# Patient Record
Sex: MALE | Race: WHITE | Employment: OTHER | URBAN - METROPOLITAN AREA
[De-identification: names, ages, dates, MRNs, and addresses within clinical notes are randomized per-mention and may not be internally consistent; named-entity substitution may affect disease eponyms.]

---

## 2019-03-18 ENCOUNTER — HOSPITAL ENCOUNTER (INPATIENT)
Age: 74
LOS: 3 days | Discharge: HOME | DRG: 556 | End: 2019-03-22
Attending: EMERGENCY MEDICINE | Admitting: INTERNAL MEDICINE
Payer: MEDICARE

## 2019-03-18 ENCOUNTER — APPOINTMENT (OUTPATIENT)
Dept: GENERAL RADIOLOGY | Age: 74
DRG: 556 | End: 2019-03-18
Payer: MEDICARE

## 2019-03-18 DIAGNOSIS — R77.8 ELEVATED TROPONIN: ICD-10-CM

## 2019-03-18 DIAGNOSIS — R41.0 CONFUSION: Primary | ICD-10-CM

## 2019-03-18 PROCEDURE — 93005 ELECTROCARDIOGRAM TRACING: CPT | Performed by: EMERGENCY MEDICINE

## 2019-03-18 PROCEDURE — 99285 EMERGENCY DEPT VISIT HI MDM: CPT

## 2019-03-18 RX ORDER — PREGABALIN 100 MG/1
100 CAPSULE ORAL 2 TIMES DAILY
COMMUNITY

## 2019-03-18 RX ORDER — METOPROLOL TARTRATE 50 MG/1
25 TABLET, FILM COATED ORAL 2 TIMES DAILY
COMMUNITY

## 2019-03-18 RX ORDER — ALBUTEROL SULFATE 90 UG/1
2 AEROSOL, METERED RESPIRATORY (INHALATION) EVERY 6 HOURS PRN
COMMUNITY

## 2019-03-18 RX ORDER — KETOROLAC TROMETHAMINE 30 MG/ML
15 INJECTION, SOLUTION INTRAMUSCULAR; INTRAVENOUS ONCE
Status: COMPLETED | OUTPATIENT
Start: 2019-03-19 | End: 2019-03-19

## 2019-03-18 RX ORDER — BUPROPION HYDROCHLORIDE 100 MG/1
100 TABLET ORAL 2 TIMES DAILY
Status: ON HOLD | COMMUNITY
End: 2019-03-19

## 2019-03-18 RX ORDER — OXYCODONE HCL 20 MG/1
20 TABLET, FILM COATED, EXTENDED RELEASE ORAL EVERY 12 HOURS
COMMUNITY

## 2019-03-18 RX ORDER — TRAZODONE HYDROCHLORIDE 100 MG/1
100 TABLET ORAL NIGHTLY
COMMUNITY

## 2019-03-18 ASSESSMENT — PAIN SCALES - GENERAL: PAINLEVEL_OUTOF10: 10

## 2019-03-18 ASSESSMENT — PAIN DESCRIPTION - FREQUENCY: FREQUENCY: CONTINUOUS

## 2019-03-18 ASSESSMENT — PAIN DESCRIPTION - LOCATION: LOCATION: GENERALIZED

## 2019-03-18 ASSESSMENT — PAIN DESCRIPTION - PAIN TYPE: TYPE: CHRONIC PAIN

## 2019-03-18 ASSESSMENT — PAIN DESCRIPTION - ONSET: ONSET: PROGRESSIVE

## 2019-03-18 ASSESSMENT — PAIN DESCRIPTION - PROGRESSION: CLINICAL_PROGRESSION: NOT CHANGED

## 2019-03-18 ASSESSMENT — PAIN DESCRIPTION - DESCRIPTORS: DESCRIPTORS: ACHING

## 2019-03-19 ENCOUNTER — APPOINTMENT (OUTPATIENT)
Dept: GENERAL RADIOLOGY | Age: 74
DRG: 556 | End: 2019-03-19
Payer: MEDICARE

## 2019-03-19 ENCOUNTER — APPOINTMENT (OUTPATIENT)
Dept: CT IMAGING | Age: 74
DRG: 556 | End: 2019-03-19
Payer: MEDICARE

## 2019-03-19 PROBLEM — I21.4 NSTEMI (NON-ST ELEVATED MYOCARDIAL INFARCTION) (HCC): Status: ACTIVE | Noted: 2019-03-19

## 2019-03-19 LAB
A/G RATIO: 0.9 (ref 1.1–2.2)
ALBUMIN SERPL-MCNC: 3.3 G/DL (ref 3.4–5)
ALP BLD-CCNC: 124 U/L (ref 40–129)
ALT SERPL-CCNC: 12 U/L (ref 10–40)
AMORPHOUS: ABNORMAL /HPF
ANION GAP SERPL CALCULATED.3IONS-SCNC: 10 MMOL/L (ref 3–16)
AST SERPL-CCNC: 17 U/L (ref 15–37)
BACTERIA: ABNORMAL /HPF
BASOPHILS ABSOLUTE: 0.1 K/UL (ref 0–0.2)
BASOPHILS RELATIVE PERCENT: 0.4 %
BILIRUB SERPL-MCNC: 1.1 MG/DL (ref 0–1)
BILIRUBIN URINE: NEGATIVE
BLOOD, URINE: ABNORMAL
BUN BLDV-MCNC: 17 MG/DL (ref 7–20)
CALCIUM SERPL-MCNC: 8.7 MG/DL (ref 8.3–10.6)
CHLORIDE BLD-SCNC: 98 MMOL/L (ref 99–110)
CHP ED QC CHECK: NORMAL
CLARITY: CLEAR
CO2: 28 MMOL/L (ref 21–32)
COLOR: YELLOW
CREAT SERPL-MCNC: 1.1 MG/DL (ref 0.8–1.3)
EKG ATRIAL RATE: 86 BPM
EKG DIAGNOSIS: NORMAL
EKG P AXIS: 60 DEGREES
EKG P-R INTERVAL: 180 MS
EKG Q-T INTERVAL: 394 MS
EKG QRS DURATION: 106 MS
EKG QTC CALCULATION (BAZETT): 471 MS
EKG R AXIS: 23 DEGREES
EKG T AXIS: 30 DEGREES
EKG VENTRICULAR RATE: 86 BPM
EOSINOPHILS ABSOLUTE: 0 K/UL (ref 0–0.6)
EOSINOPHILS RELATIVE PERCENT: 0 %
GFR AFRICAN AMERICAN: >60
GFR NON-AFRICAN AMERICAN: >60
GLOBULIN: 3.6 G/DL
GLUCOSE BLD-MCNC: 121 MG/DL
GLUCOSE BLD-MCNC: 121 MG/DL (ref 70–99)
GLUCOSE BLD-MCNC: 162 MG/DL (ref 70–99)
GLUCOSE URINE: NEGATIVE MG/DL
HCT VFR BLD CALC: 40.3 % (ref 40.5–52.5)
HEMOGLOBIN: 13.9 G/DL (ref 13.5–17.5)
KETONES, URINE: NEGATIVE MG/DL
LACTIC ACID: 0.8 MMOL/L (ref 0.4–2)
LEUKOCYTE ESTERASE, URINE: NEGATIVE
LYMPHOCYTES ABSOLUTE: 0.7 K/UL (ref 1–5.1)
LYMPHOCYTES RELATIVE PERCENT: 4.1 %
MAGNESIUM: 1.5 MG/DL (ref 1.8–2.4)
MCH RBC QN AUTO: 32.6 PG (ref 26–34)
MCHC RBC AUTO-ENTMCNC: 34.4 G/DL (ref 31–36)
MCV RBC AUTO: 94.6 FL (ref 80–100)
MICROSCOPIC EXAMINATION: YES
MONOCYTES ABSOLUTE: 1.3 K/UL (ref 0–1.3)
MONOCYTES RELATIVE PERCENT: 7.4 %
NEUTROPHILS ABSOLUTE: 15.3 K/UL (ref 1.7–7.7)
NEUTROPHILS RELATIVE PERCENT: 88.1 %
NITRITE, URINE: NEGATIVE
PDW BLD-RTO: 14.1 % (ref 12.4–15.4)
PERFORMED ON: ABNORMAL
PH UA: 6 (ref 5–8)
PLATELET # BLD: 144 K/UL (ref 135–450)
PMV BLD AUTO: 10.8 FL (ref 5–10.5)
POTASSIUM REFLEX MAGNESIUM: 3.3 MMOL/L (ref 3.5–5.1)
PROTEIN UA: 100 MG/DL
RAPID INFLUENZA  B AGN: NEGATIVE
RAPID INFLUENZA A AGN: NEGATIVE
RBC # BLD: 4.26 M/UL (ref 4.2–5.9)
RBC UA: ABNORMAL /HPF (ref 0–2)
SODIUM BLD-SCNC: 136 MMOL/L (ref 136–145)
SPECIFIC GRAVITY UA: >=1.03 (ref 1–1.03)
TOTAL PROTEIN: 6.9 G/DL (ref 6.4–8.2)
TROPONIN: 0.05 NG/ML
TROPONIN: 0.06 NG/ML
TROPONIN: 0.08 NG/ML
URINE REFLEX TO CULTURE: ABNORMAL
URINE TYPE: ABNORMAL
UROBILINOGEN, URINE: 0.2 E.U./DL
WBC # BLD: 17.4 K/UL (ref 4–11)
WBC UA: ABNORMAL /HPF (ref 0–5)

## 2019-03-19 PROCEDURE — 96372 THER/PROPH/DIAG INJ SC/IM: CPT

## 2019-03-19 PROCEDURE — 87804 INFLUENZA ASSAY W/OPTIC: CPT

## 2019-03-19 PROCEDURE — 96361 HYDRATE IV INFUSION ADD-ON: CPT

## 2019-03-19 PROCEDURE — 81001 URINALYSIS AUTO W/SCOPE: CPT

## 2019-03-19 PROCEDURE — 80053 COMPREHEN METABOLIC PANEL: CPT

## 2019-03-19 PROCEDURE — 6370000000 HC RX 637 (ALT 250 FOR IP): Performed by: NURSE PRACTITIONER

## 2019-03-19 PROCEDURE — 6360000002 HC RX W HCPCS: Performed by: EMERGENCY MEDICINE

## 2019-03-19 PROCEDURE — 96375 TX/PRO/DX INJ NEW DRUG ADDON: CPT

## 2019-03-19 PROCEDURE — 93010 ELECTROCARDIOGRAM REPORT: CPT | Performed by: INTERNAL MEDICINE

## 2019-03-19 PROCEDURE — 1200000000 HC SEMI PRIVATE

## 2019-03-19 PROCEDURE — 70450 CT HEAD/BRAIN W/O DYE: CPT

## 2019-03-19 PROCEDURE — 99222 1ST HOSP IP/OBS MODERATE 55: CPT | Performed by: INTERNAL MEDICINE

## 2019-03-19 PROCEDURE — 87040 BLOOD CULTURE FOR BACTERIA: CPT

## 2019-03-19 PROCEDURE — 6370000000 HC RX 637 (ALT 250 FOR IP): Performed by: EMERGENCY MEDICINE

## 2019-03-19 PROCEDURE — 36415 COLL VENOUS BLD VENIPUNCTURE: CPT

## 2019-03-19 PROCEDURE — 96365 THER/PROPH/DIAG IV INF INIT: CPT

## 2019-03-19 PROCEDURE — 2580000003 HC RX 258: Performed by: EMERGENCY MEDICINE

## 2019-03-19 PROCEDURE — 6360000002 HC RX W HCPCS: Performed by: NURSE PRACTITIONER

## 2019-03-19 PROCEDURE — 96366 THER/PROPH/DIAG IV INF ADDON: CPT

## 2019-03-19 PROCEDURE — 84484 ASSAY OF TROPONIN QUANT: CPT

## 2019-03-19 PROCEDURE — 71045 X-RAY EXAM CHEST 1 VIEW: CPT

## 2019-03-19 PROCEDURE — 83605 ASSAY OF LACTIC ACID: CPT

## 2019-03-19 PROCEDURE — 2580000003 HC RX 258: Performed by: NURSE PRACTITIONER

## 2019-03-19 PROCEDURE — 83735 ASSAY OF MAGNESIUM: CPT

## 2019-03-19 PROCEDURE — 85025 COMPLETE CBC W/AUTO DIFF WBC: CPT

## 2019-03-19 PROCEDURE — 96376 TX/PRO/DX INJ SAME DRUG ADON: CPT

## 2019-03-19 RX ORDER — ACETAMINOPHEN 325 MG/1
650 TABLET ORAL ONCE
Status: COMPLETED | OUTPATIENT
Start: 2019-03-19 | End: 2019-03-19

## 2019-03-19 RX ORDER — ALBUTEROL SULFATE 90 UG/1
2 AEROSOL, METERED RESPIRATORY (INHALATION) EVERY 6 HOURS PRN
Status: DISCONTINUED | OUTPATIENT
Start: 2019-03-19 | End: 2019-03-22 | Stop reason: HOSPADM

## 2019-03-19 RX ORDER — FENTANYL CITRATE 50 UG/ML
50 INJECTION, SOLUTION INTRAMUSCULAR; INTRAVENOUS ONCE
Status: COMPLETED | OUTPATIENT
Start: 2019-03-19 | End: 2019-03-19

## 2019-03-19 RX ORDER — HYDROXYZINE PAMOATE 25 MG/1
25 CAPSULE ORAL 3 TIMES DAILY PRN
COMMUNITY

## 2019-03-19 RX ORDER — OMEPRAZOLE 20 MG/1
20 CAPSULE, DELAYED RELEASE ORAL DAILY
COMMUNITY

## 2019-03-19 RX ORDER — ATORVASTATIN CALCIUM 10 MG/1
40 TABLET, FILM COATED ORAL NIGHTLY
Status: DISCONTINUED | OUTPATIENT
Start: 2019-03-19 | End: 2019-03-22 | Stop reason: HOSPADM

## 2019-03-19 RX ORDER — SENNA PLUS 8.6 MG/1
1 TABLET ORAL 2 TIMES DAILY
COMMUNITY

## 2019-03-19 RX ORDER — BUPROPION HYDROCHLORIDE 100 MG/1
100 TABLET ORAL 2 TIMES DAILY
Status: DISCONTINUED | OUTPATIENT
Start: 2019-03-19 | End: 2019-03-22 | Stop reason: HOSPADM

## 2019-03-19 RX ORDER — OXYCODONE HCL 20 MG/1
20 TABLET, FILM COATED, EXTENDED RELEASE ORAL EVERY 12 HOURS
Status: DISCONTINUED | OUTPATIENT
Start: 2019-03-19 | End: 2019-03-22 | Stop reason: HOSPADM

## 2019-03-19 RX ORDER — BUPROPION HYDROCHLORIDE 150 MG/1
300 TABLET ORAL EVERY MORNING
COMMUNITY

## 2019-03-19 RX ORDER — ASPIRIN 81 MG/1
81 TABLET, CHEWABLE ORAL DAILY
Status: DISCONTINUED | OUTPATIENT
Start: 2019-03-20 | End: 2019-03-22 | Stop reason: HOSPADM

## 2019-03-19 RX ORDER — SODIUM CHLORIDE 9 MG/ML
INJECTION, SOLUTION INTRAVENOUS CONTINUOUS
Status: DISCONTINUED | OUTPATIENT
Start: 2019-03-19 | End: 2019-03-21

## 2019-03-19 RX ORDER — MORPHINE SULFATE 2 MG/ML
2 INJECTION, SOLUTION INTRAMUSCULAR; INTRAVENOUS EVERY 4 HOURS PRN
Status: DISCONTINUED | OUTPATIENT
Start: 2019-03-19 | End: 2019-03-22 | Stop reason: HOSPADM

## 2019-03-19 RX ORDER — SODIUM CHLORIDE 0.9 % (FLUSH) 0.9 %
10 SYRINGE (ML) INJECTION EVERY 12 HOURS SCHEDULED
Status: DISCONTINUED | OUTPATIENT
Start: 2019-03-19 | End: 2019-03-22 | Stop reason: HOSPADM

## 2019-03-19 RX ORDER — ONDANSETRON 2 MG/ML
4 INJECTION INTRAMUSCULAR; INTRAVENOUS EVERY 6 HOURS PRN
Status: DISCONTINUED | OUTPATIENT
Start: 2019-03-19 | End: 2019-03-22 | Stop reason: HOSPADM

## 2019-03-19 RX ORDER — ATORVASTATIN CALCIUM 40 MG/1
40 TABLET, FILM COATED ORAL NIGHTLY
COMMUNITY

## 2019-03-19 RX ORDER — ALLOPURINOL 100 MG/1
200 TABLET ORAL DAILY
COMMUNITY

## 2019-03-19 RX ORDER — SERTRALINE HYDROCHLORIDE 100 MG/1
100 TABLET, FILM COATED ORAL DAILY
COMMUNITY

## 2019-03-19 RX ORDER — SODIUM CHLORIDE 0.9 % (FLUSH) 0.9 %
10 SYRINGE (ML) INJECTION PRN
Status: DISCONTINUED | OUTPATIENT
Start: 2019-03-19 | End: 2019-03-22 | Stop reason: HOSPADM

## 2019-03-19 RX ORDER — TRAZODONE HYDROCHLORIDE 50 MG/1
100 TABLET ORAL NIGHTLY
Status: DISCONTINUED | OUTPATIENT
Start: 2019-03-19 | End: 2019-03-22 | Stop reason: HOSPADM

## 2019-03-19 RX ORDER — LISINOPRIL 5 MG/1
5 TABLET ORAL DAILY
COMMUNITY

## 2019-03-19 RX ORDER — MAGNESIUM SULFATE IN WATER 40 MG/ML
1 INJECTION, SOLUTION INTRAVENOUS ONCE
Status: COMPLETED | OUTPATIENT
Start: 2019-03-19 | End: 2019-03-19

## 2019-03-19 RX ORDER — SODIUM CHLORIDE 9 MG/ML
1000 INJECTION, SOLUTION INTRAVENOUS ONCE
Status: COMPLETED | OUTPATIENT
Start: 2019-03-19 | End: 2019-03-19

## 2019-03-19 RX ORDER — NITROGLYCERIN 0.4 MG/1
0.4 TABLET SUBLINGUAL EVERY 5 MIN PRN
Status: DISCONTINUED | OUTPATIENT
Start: 2019-03-19 | End: 2019-03-22 | Stop reason: HOSPADM

## 2019-03-19 RX ADMIN — MAGNESIUM SULFATE HEPTAHYDRATE 1 G: 40 INJECTION, SOLUTION INTRAVENOUS at 01:47

## 2019-03-19 RX ADMIN — ENOXAPARIN SODIUM 100 MG: 100 INJECTION SUBCUTANEOUS at 20:57

## 2019-03-19 RX ADMIN — TRAZODONE HYDROCHLORIDE 100 MG: 50 TABLET ORAL at 20:58

## 2019-03-19 RX ADMIN — METOPROLOL TARTRATE 50 MG: 25 TABLET ORAL at 09:44

## 2019-03-19 RX ADMIN — FENTANYL CITRATE 50 MCG: 50 INJECTION, SOLUTION INTRAMUSCULAR; INTRAVENOUS at 01:47

## 2019-03-19 RX ADMIN — PREGABALIN 100 MG: 75 CAPSULE ORAL at 09:44

## 2019-03-19 RX ADMIN — SODIUM CHLORIDE 1000 ML: 9 INJECTION, SOLUTION INTRAVENOUS at 01:47

## 2019-03-19 RX ADMIN — Medication 10 ML: at 09:45

## 2019-03-19 RX ADMIN — Medication 10 ML: at 21:00

## 2019-03-19 RX ADMIN — ATORVASTATIN CALCIUM 40 MG: 10 TABLET, FILM COATED ORAL at 20:58

## 2019-03-19 RX ADMIN — FENTANYL CITRATE 50 MCG: 50 INJECTION, SOLUTION INTRAMUSCULAR; INTRAVENOUS at 00:30

## 2019-03-19 RX ADMIN — SODIUM CHLORIDE: 9 INJECTION, SOLUTION INTRAVENOUS at 09:45

## 2019-03-19 RX ADMIN — METOPROLOL TARTRATE 50 MG: 25 TABLET ORAL at 20:58

## 2019-03-19 RX ADMIN — PREGABALIN 100 MG: 75 CAPSULE ORAL at 20:58

## 2019-03-19 RX ADMIN — ENOXAPARIN SODIUM 100 MG: 100 INJECTION SUBCUTANEOUS at 09:44

## 2019-03-19 RX ADMIN — OXYCODONE HYDROCHLORIDE 20 MG: 20 TABLET, FILM COATED, EXTENDED RELEASE ORAL at 21:10

## 2019-03-19 RX ADMIN — ACETAMINOPHEN 650 MG: 325 TABLET ORAL at 00:19

## 2019-03-19 RX ADMIN — OXYCODONE HYDROCHLORIDE 20 MG: 20 TABLET, FILM COATED, EXTENDED RELEASE ORAL at 09:44

## 2019-03-19 RX ADMIN — KETOROLAC TROMETHAMINE 30 MG: 30 INJECTION, SOLUTION INTRAMUSCULAR; INTRAVENOUS at 00:20

## 2019-03-19 RX ADMIN — BUPROPION HYDROCHLORIDE 100 MG: 100 TABLET, FILM COATED ORAL at 09:44

## 2019-03-19 RX ADMIN — BUPROPION HYDROCHLORIDE 100 MG: 100 TABLET, FILM COATED ORAL at 20:58

## 2019-03-19 ASSESSMENT — PAIN DESCRIPTION - DESCRIPTORS: DESCRIPTORS: ACHING

## 2019-03-19 ASSESSMENT — PAIN SCALES - GENERAL
PAINLEVEL_OUTOF10: 10
PAINLEVEL_OUTOF10: 6
PAINLEVEL_OUTOF10: 5
PAINLEVEL_OUTOF10: 6
PAINLEVEL_OUTOF10: 10
PAINLEVEL_OUTOF10: 9

## 2019-03-19 ASSESSMENT — PAIN DESCRIPTION - FREQUENCY: FREQUENCY: CONTINUOUS

## 2019-03-19 ASSESSMENT — PAIN DESCRIPTION - LOCATION: LOCATION: GENERALIZED

## 2019-03-19 ASSESSMENT — PAIN DESCRIPTION - PAIN TYPE: TYPE: CHRONIC PAIN

## 2019-03-20 ENCOUNTER — APPOINTMENT (OUTPATIENT)
Dept: GENERAL RADIOLOGY | Age: 74
DRG: 556 | End: 2019-03-20
Payer: MEDICARE

## 2019-03-20 PROBLEM — E44.0 MODERATE MALNUTRITION (HCC): Chronic | Status: ACTIVE | Noted: 2019-03-20

## 2019-03-20 LAB
ANION GAP SERPL CALCULATED.3IONS-SCNC: 8 MMOL/L (ref 3–16)
BUN BLDV-MCNC: 20 MG/DL (ref 7–20)
CALCIUM SERPL-MCNC: 8.5 MG/DL (ref 8.3–10.6)
CHLORIDE BLD-SCNC: 104 MMOL/L (ref 99–110)
CHOLESTEROL, TOTAL: 91 MG/DL (ref 0–199)
CO2: 28 MMOL/L (ref 21–32)
CREAT SERPL-MCNC: 1.1 MG/DL (ref 0.8–1.3)
EKG ATRIAL RATE: 50 BPM
EKG DIAGNOSIS: NORMAL
EKG P AXIS: 59 DEGREES
EKG P-R INTERVAL: 190 MS
EKG Q-T INTERVAL: 458 MS
EKG QRS DURATION: 108 MS
EKG QTC CALCULATION (BAZETT): 417 MS
EKG R AXIS: 16 DEGREES
EKG T AXIS: 23 DEGREES
EKG VENTRICULAR RATE: 50 BPM
GFR AFRICAN AMERICAN: >60
GFR NON-AFRICAN AMERICAN: >60
GLUCOSE BLD-MCNC: 101 MG/DL (ref 70–99)
HCT VFR BLD CALC: 34.1 % (ref 40.5–52.5)
HDLC SERPL-MCNC: 32 MG/DL (ref 40–60)
HEMOGLOBIN: 11.6 G/DL (ref 13.5–17.5)
LDL CHOLESTEROL CALCULATED: 41 MG/DL
LV EF: 53 %
LVEF MODALITY: NORMAL
MCH RBC QN AUTO: 32.9 PG (ref 26–34)
MCHC RBC AUTO-ENTMCNC: 33.9 G/DL (ref 31–36)
MCV RBC AUTO: 97.1 FL (ref 80–100)
PDW BLD-RTO: 14.5 % (ref 12.4–15.4)
PLATELET # BLD: 113 K/UL (ref 135–450)
PLATELET SLIDE REVIEW: ABNORMAL
PMV BLD AUTO: 11.2 FL (ref 5–10.5)
POTASSIUM REFLEX MAGNESIUM: 4 MMOL/L (ref 3.5–5.1)
RBC # BLD: 3.52 M/UL (ref 4.2–5.9)
SLIDE REVIEW: ABNORMAL
SODIUM BLD-SCNC: 140 MMOL/L (ref 136–145)
TRIGL SERPL-MCNC: 88 MG/DL (ref 0–150)
VLDLC SERPL CALC-MCNC: 18 MG/DL
WBC # BLD: 8.3 K/UL (ref 4–11)

## 2019-03-20 PROCEDURE — 93010 ELECTROCARDIOGRAM REPORT: CPT | Performed by: INTERNAL MEDICINE

## 2019-03-20 PROCEDURE — 36415 COLL VENOUS BLD VENIPUNCTURE: CPT

## 2019-03-20 PROCEDURE — 85027 COMPLETE CBC AUTOMATED: CPT

## 2019-03-20 PROCEDURE — 2580000003 HC RX 258: Performed by: NURSE PRACTITIONER

## 2019-03-20 PROCEDURE — 80048 BASIC METABOLIC PNL TOTAL CA: CPT

## 2019-03-20 PROCEDURE — 93005 ELECTROCARDIOGRAM TRACING: CPT | Performed by: NURSE PRACTITIONER

## 2019-03-20 PROCEDURE — 6360000002 HC RX W HCPCS: Performed by: NURSE PRACTITIONER

## 2019-03-20 PROCEDURE — 73560 X-RAY EXAM OF KNEE 1 OR 2: CPT

## 2019-03-20 PROCEDURE — 1200000000 HC SEMI PRIVATE

## 2019-03-20 PROCEDURE — 80061 LIPID PANEL: CPT

## 2019-03-20 PROCEDURE — 6360000004 HC RX CONTRAST MEDICATION: Performed by: INTERNAL MEDICINE

## 2019-03-20 PROCEDURE — C8929 TTE W OR WO FOL WCON,DOPPLER: HCPCS

## 2019-03-20 PROCEDURE — 6370000000 HC RX 637 (ALT 250 FOR IP): Performed by: NURSE PRACTITIONER

## 2019-03-20 RX ADMIN — SODIUM CHLORIDE: 9 INJECTION, SOLUTION INTRAVENOUS at 07:43

## 2019-03-20 RX ADMIN — METOPROLOL TARTRATE 50 MG: 25 TABLET ORAL at 09:28

## 2019-03-20 RX ADMIN — ENOXAPARIN SODIUM 100 MG: 100 INJECTION SUBCUTANEOUS at 21:05

## 2019-03-20 RX ADMIN — OXYCODONE HYDROCHLORIDE 20 MG: 20 TABLET, FILM COATED, EXTENDED RELEASE ORAL at 21:04

## 2019-03-20 RX ADMIN — OXYCODONE HYDROCHLORIDE 20 MG: 20 TABLET, FILM COATED, EXTENDED RELEASE ORAL at 09:29

## 2019-03-20 RX ADMIN — Medication 10 ML: at 09:33

## 2019-03-20 RX ADMIN — TRAZODONE HYDROCHLORIDE 100 MG: 50 TABLET ORAL at 21:05

## 2019-03-20 RX ADMIN — BUPROPION HYDROCHLORIDE 100 MG: 100 TABLET, FILM COATED ORAL at 21:05

## 2019-03-20 RX ADMIN — SODIUM CHLORIDE: 9 INJECTION, SOLUTION INTRAVENOUS at 21:03

## 2019-03-20 RX ADMIN — PREGABALIN 100 MG: 75 CAPSULE ORAL at 21:04

## 2019-03-20 RX ADMIN — ATORVASTATIN CALCIUM 40 MG: 10 TABLET, FILM COATED ORAL at 21:04

## 2019-03-20 RX ADMIN — SODIUM CHLORIDE: 9 INJECTION, SOLUTION INTRAVENOUS at 00:34

## 2019-03-20 RX ADMIN — METOPROLOL TARTRATE 50 MG: 25 TABLET ORAL at 21:18

## 2019-03-20 RX ADMIN — PERFLUTREN 2.42 MG: 6.52 INJECTION, SUSPENSION INTRAVENOUS at 07:41

## 2019-03-20 RX ADMIN — BUPROPION HYDROCHLORIDE 100 MG: 100 TABLET, FILM COATED ORAL at 09:28

## 2019-03-20 RX ADMIN — ENOXAPARIN SODIUM 100 MG: 100 INJECTION SUBCUTANEOUS at 09:30

## 2019-03-20 RX ADMIN — ASPIRIN 81 MG 81 MG: 81 TABLET ORAL at 09:28

## 2019-03-20 RX ADMIN — PREGABALIN 100 MG: 75 CAPSULE ORAL at 09:28

## 2019-03-20 ASSESSMENT — PAIN SCALES - GENERAL
PAINLEVEL_OUTOF10: 7
PAINLEVEL_OUTOF10: 5
PAINLEVEL_OUTOF10: 0

## 2019-03-21 ENCOUNTER — APPOINTMENT (OUTPATIENT)
Dept: GENERAL RADIOLOGY | Age: 74
DRG: 556 | End: 2019-03-21
Payer: MEDICARE

## 2019-03-21 PROCEDURE — 73030 X-RAY EXAM OF SHOULDER: CPT

## 2019-03-21 PROCEDURE — 1200000000 HC SEMI PRIVATE

## 2019-03-21 PROCEDURE — 6360000002 HC RX W HCPCS: Performed by: NURSE PRACTITIONER

## 2019-03-21 PROCEDURE — 99233 SBSQ HOSP IP/OBS HIGH 50: CPT | Performed by: INTERNAL MEDICINE

## 2019-03-21 PROCEDURE — 6360000002 HC RX W HCPCS: Performed by: INTERNAL MEDICINE

## 2019-03-21 PROCEDURE — 2580000003 HC RX 258: Performed by: NURSE PRACTITIONER

## 2019-03-21 PROCEDURE — 90686 IIV4 VACC NO PRSV 0.5 ML IM: CPT | Performed by: INTERNAL MEDICINE

## 2019-03-21 PROCEDURE — 6370000000 HC RX 637 (ALT 250 FOR IP): Performed by: NURSE PRACTITIONER

## 2019-03-21 PROCEDURE — G0008 ADMIN INFLUENZA VIRUS VAC: HCPCS | Performed by: INTERNAL MEDICINE

## 2019-03-21 RX ORDER — HYDRALAZINE HYDROCHLORIDE 20 MG/ML
5 INJECTION INTRAMUSCULAR; INTRAVENOUS EVERY 6 HOURS PRN
Status: DISCONTINUED | OUTPATIENT
Start: 2019-03-21 | End: 2019-03-22 | Stop reason: HOSPADM

## 2019-03-21 RX ADMIN — ATORVASTATIN CALCIUM 40 MG: 10 TABLET, FILM COATED ORAL at 20:06

## 2019-03-21 RX ADMIN — ENOXAPARIN SODIUM 100 MG: 100 INJECTION SUBCUTANEOUS at 20:06

## 2019-03-21 RX ADMIN — BUPROPION HYDROCHLORIDE 100 MG: 100 TABLET, FILM COATED ORAL at 20:06

## 2019-03-21 RX ADMIN — OXYCODONE HYDROCHLORIDE 20 MG: 20 TABLET, FILM COATED, EXTENDED RELEASE ORAL at 10:00

## 2019-03-21 RX ADMIN — METOPROLOL TARTRATE 50 MG: 25 TABLET ORAL at 10:00

## 2019-03-21 RX ADMIN — PREGABALIN 100 MG: 75 CAPSULE ORAL at 10:00

## 2019-03-21 RX ADMIN — Medication 10 ML: at 10:02

## 2019-03-21 RX ADMIN — INFLUENZA A VIRUS A/MICHIGAN/45/2015 X-275 (H1N1) ANTIGEN (FORMALDEHYDE INACTIVATED), INFLUENZA A VIRUS A/SINGAPORE/INFIMH-16-0019/2016 IVR-186 (H3N2) ANTIGEN (FORMALDEHYDE INACTIVATED), INFLUENZA B VIRUS B/PHUKET/3073/2013 ANTIGEN (FORMALDEHYDE INACTIVATED), AND INFLUENZA B VIRUS B/MARYLAND/15/2016 BX-69A ANTIGEN (FORMALDEHYDE INACTIVATED) 0.5 ML: 15; 15; 15; 15 INJECTION, SUSPENSION INTRAMUSCULAR at 10:00

## 2019-03-21 RX ADMIN — PREGABALIN 100 MG: 75 CAPSULE ORAL at 20:06

## 2019-03-21 RX ADMIN — ENOXAPARIN SODIUM 100 MG: 100 INJECTION SUBCUTANEOUS at 09:59

## 2019-03-21 RX ADMIN — ASPIRIN 81 MG 81 MG: 81 TABLET ORAL at 10:00

## 2019-03-21 RX ADMIN — OXYCODONE HYDROCHLORIDE 20 MG: 20 TABLET, FILM COATED, EXTENDED RELEASE ORAL at 20:06

## 2019-03-21 RX ADMIN — TRAZODONE HYDROCHLORIDE 100 MG: 50 TABLET ORAL at 20:06

## 2019-03-21 RX ADMIN — BUPROPION HYDROCHLORIDE 100 MG: 100 TABLET, FILM COATED ORAL at 10:00

## 2019-03-21 RX ADMIN — Medication 10 ML: at 20:07

## 2019-03-21 ASSESSMENT — PAIN SCALES - GENERAL
PAINLEVEL_OUTOF10: 8
PAINLEVEL_OUTOF10: 5

## 2019-03-22 VITALS
RESPIRATION RATE: 16 BRPM | DIASTOLIC BLOOD PRESSURE: 84 MMHG | HEART RATE: 59 BPM | HEIGHT: 73 IN | WEIGHT: 237.2 LBS | BODY MASS INDEX: 31.44 KG/M2 | SYSTOLIC BLOOD PRESSURE: 153 MMHG | OXYGEN SATURATION: 95 % | TEMPERATURE: 98.5 F

## 2019-03-22 PROCEDURE — 97162 PT EVAL MOD COMPLEX 30 MIN: CPT

## 2019-03-22 PROCEDURE — 6360000002 HC RX W HCPCS: Performed by: NURSE PRACTITIONER

## 2019-03-22 PROCEDURE — 97530 THERAPEUTIC ACTIVITIES: CPT

## 2019-03-22 PROCEDURE — 6370000000 HC RX 637 (ALT 250 FOR IP): Performed by: NURSE PRACTITIONER

## 2019-03-22 PROCEDURE — 97116 GAIT TRAINING THERAPY: CPT

## 2019-03-22 PROCEDURE — 2580000003 HC RX 258: Performed by: NURSE PRACTITIONER

## 2019-03-22 PROCEDURE — 97166 OT EVAL MOD COMPLEX 45 MIN: CPT

## 2019-03-22 RX ADMIN — METOPROLOL TARTRATE 50 MG: 25 TABLET ORAL at 10:18

## 2019-03-22 RX ADMIN — Medication 10 ML: at 09:27

## 2019-03-22 RX ADMIN — ENOXAPARIN SODIUM 100 MG: 100 INJECTION SUBCUTANEOUS at 09:25

## 2019-03-22 RX ADMIN — OXYCODONE HYDROCHLORIDE 20 MG: 20 TABLET, FILM COATED, EXTENDED RELEASE ORAL at 09:24

## 2019-03-22 RX ADMIN — PREGABALIN 100 MG: 75 CAPSULE ORAL at 09:23

## 2019-03-22 RX ADMIN — ASPIRIN 81 MG 81 MG: 81 TABLET ORAL at 09:24

## 2019-03-22 RX ADMIN — BUPROPION HYDROCHLORIDE 100 MG: 100 TABLET, FILM COATED ORAL at 09:24

## 2019-03-22 ASSESSMENT — PAIN SCALES - GENERAL
PAINLEVEL_OUTOF10: 8

## 2019-03-22 ASSESSMENT — PAIN DESCRIPTION - LOCATION
LOCATION: NECK
LOCATION: NECK;KNEE;SHOULDER

## 2019-03-22 ASSESSMENT — PAIN DESCRIPTION - PAIN TYPE
TYPE: ACUTE PAIN
TYPE: ACUTE PAIN

## 2019-03-24 LAB
BLOOD CULTURE, ROUTINE: NORMAL
CULTURE, BLOOD 2: NORMAL

## 2020-08-27 ENCOUNTER — APPOINTMENT (OUTPATIENT)
Dept: GENERAL RADIOLOGY | Age: 75
End: 2020-08-27
Payer: OTHER GOVERNMENT

## 2020-08-27 ENCOUNTER — HOSPITAL ENCOUNTER (EMERGENCY)
Age: 75
Discharge: HOME | End: 2020-08-27
Payer: OTHER GOVERNMENT

## 2020-08-27 VITALS
TEMPERATURE: 97.9 F | RESPIRATION RATE: 22 BRPM | HEIGHT: 73 IN | SYSTOLIC BLOOD PRESSURE: 146 MMHG | OXYGEN SATURATION: 92 % | BODY MASS INDEX: 31.41 KG/M2 | DIASTOLIC BLOOD PRESSURE: 104 MMHG | WEIGHT: 237 LBS | HEART RATE: 72 BPM

## 2020-08-27 PROCEDURE — 29125 APPL SHORT ARM SPLINT STATIC: CPT

## 2020-08-27 PROCEDURE — 73130 X-RAY EXAM OF HAND: CPT

## 2020-08-27 PROCEDURE — 99284 EMERGENCY DEPT VISIT MOD MDM: CPT

## 2020-08-27 RX ORDER — HYDROCODONE BITARTRATE AND ACETAMINOPHEN 5; 325 MG/1; MG/1
1 TABLET ORAL EVERY 6 HOURS PRN
Qty: 8 TABLET | Refills: 0 | Status: SHIPPED | OUTPATIENT
Start: 2020-08-27 | End: 2020-08-28

## 2020-08-27 ASSESSMENT — PAIN DESCRIPTION - LOCATION: LOCATION: ARM

## 2020-08-27 ASSESSMENT — PAIN DESCRIPTION - PAIN TYPE: TYPE: ACUTE PAIN

## 2020-08-27 ASSESSMENT — PAIN DESCRIPTION - ORIENTATION: ORIENTATION: LEFT

## 2020-08-27 ASSESSMENT — PAIN SCALES - GENERAL: PAINLEVEL_OUTOF10: 6

## 2020-08-28 ENCOUNTER — TELEPHONE (OUTPATIENT)
Dept: ORTHOPEDIC SURGERY | Age: 75
End: 2020-08-28

## 2020-08-28 ENCOUNTER — OFFICE VISIT (OUTPATIENT)
Dept: ORTHOPEDIC SURGERY | Age: 75
End: 2020-08-28
Payer: OTHER GOVERNMENT

## 2020-08-28 VITALS — HEIGHT: 73 IN | BODY MASS INDEX: 31.41 KG/M2 | WEIGHT: 237 LBS

## 2020-08-28 PROBLEM — S52.502A CLOSED FRACTURE OF LEFT DISTAL RADIUS: Status: ACTIVE | Noted: 2020-08-28

## 2020-08-28 PROCEDURE — 99203 OFFICE O/P NEW LOW 30 MIN: CPT | Performed by: ORTHOPAEDIC SURGERY

## 2020-08-28 PROCEDURE — 1123F ACP DISCUSS/DSCN MKR DOCD: CPT | Performed by: ORTHOPAEDIC SURGERY

## 2020-08-28 PROCEDURE — 3017F COLORECTAL CA SCREEN DOC REV: CPT | Performed by: ORTHOPAEDIC SURGERY

## 2020-08-28 PROCEDURE — 25600 CLTX DST RDL FX/EPHYS SEP WO: CPT | Performed by: ORTHOPAEDIC SURGERY

## 2020-08-28 PROCEDURE — 4040F PNEUMOC VAC/ADMIN/RCVD: CPT | Performed by: ORTHOPAEDIC SURGERY

## 2020-08-28 PROCEDURE — 1036F TOBACCO NON-USER: CPT | Performed by: ORTHOPAEDIC SURGERY

## 2020-08-28 PROCEDURE — G8417 CALC BMI ABV UP PARAM F/U: HCPCS | Performed by: ORTHOPAEDIC SURGERY

## 2020-08-28 PROCEDURE — G8428 CUR MEDS NOT DOCUMENT: HCPCS | Performed by: ORTHOPAEDIC SURGERY

## 2020-08-28 RX ORDER — HYDROCODONE BITARTRATE AND ACETAMINOPHEN 5; 325 MG/1; MG/1
1 TABLET ORAL EVERY 6 HOURS PRN
Qty: 28 TABLET | Refills: 0 | Status: SHIPPED | OUTPATIENT
Start: 2020-08-28 | End: 2020-09-04

## 2020-08-28 NOTE — ED NOTES
Applied a stockinet to patients arm and the applied ortho glass to patients arm.  Wrapped patient arm with 2in ace wrap     [facility]  08/27/20 4038

## 2020-08-28 NOTE — PROGRESS NOTES
Chief Complaint    Wrist Injury (NP LEFT WRIST INJURY)      History of Present Illness:  Virginia Wolfe is a 70 y.o. male for evaluation of chief complaint left distal radius fracture. This started approximately yesterday when he fell. Symptoms are worse with activity and better with rest. Patient endorses wrist pain but denies denies numbness tingling. Treatment to date includes: Splint mobilization. Patient also has a history of pancreatic neuroendocrine tumor and intermittent tremor which has become more significant with the stress of his recently passed daughter. His daughter was the one who was helping him at home with his ADLs and IADLs. Medical History:  Patient's medications, allergies, past medical, surgical, social and family histories were reviewed and updated as appropriate. Review of Systems:  Relevant review of systems reviewed and available in the patient's chart. They are negative or noncontributory except as per HPI. Vital Signs: There were no vitals filed for this visit.     General: well-developed, well-nourished  CV: RR  RESP: Respirations unlabored on RA  Skin: No rashes or ulcerations appreciated  Psych: appropriate mood and affect  Musculoskeletal:    Extremity: Left upper    Inspection: Swollen ecchymotic wrist    Palpation: Tender palpation about wrist    Range of Motion: Assess secondary to pain    Stability: Not assessed    Strength: EHL intact    Special Tests: None    Gait: Unable to assess secondary to significant tremor    Pulse/perfusion: 2+ radial pulse hand warm and perfused    Neurological:    Sensation: Sensation intact light touch throughout left hand    Radiology:     X-rays obtained and reviewed in office:  Views AP lateral oblique  Location left wrist  Impression intra-articular distal radius fracture with acceptable alignment, ulnar styloid fracture    Assessment : 70year-old right-hand-dominant gentleman with a intra-articular distal radius fracture and

## 2020-08-28 NOTE — ED NOTES
Bed: 11  Expected date: 8/27/20  Expected time:   Means of arrival:   Comments:  [facility] [address] , [address] [facility]  08/27/20 2035

## 2020-08-28 NOTE — ED NOTES

## 2020-08-30 NOTE — ED PROVIDER NOTES
[address] [place] [address]  ED  EMERGENCY DEPARTMENT ENCOUNTER      This patient was not seen and evaluated by the attending physician. Pt Name: Maritza Cheney  MRN: [id number]  [place] 1945  Date of evaluation: 8/27/2020  Provider: JONATHAN Helm - CNP-C  PCP: Eduarda Hurtado MD      History provided by the patient. CHIEFCOMPLAINT:     Chief Complaint   Patient presents with    Arm Injury     Pt has a left arm injury from a fall last night. Pt states he fell due to being tired. HISTORY OF PRESENT ILLNESS:      Maritza Cheney is a 70 y.o. male who presents to [address] [place] [address]  ED with complaints of left wrist pain. Patient states that he got out of bed and fell yesterday evening. He states that he fell because he had just woke up and was tired. He denied dizziness, denied hitting his head or losing consciousness. Patient does have swelling and erythema noted to the left wrist.  He denies any other injuries or complaints. Here for further evaluation. LOCATION:left wrist  QUALITY:ache  SEVERITY:6  DURATION:happened last night  MODIFYING FACTORS:worse with movement    Nursing Notes were reviewed     REVIEW OF SYSTEMS:     Review of Systems  All systems, a total of 10, are reviewed and negative except for those that were just noted in history present illness.         PAST MEDICAL HISTORY:     Past Medical History:   Diagnosis Date    Arthritis     Atrial fibrillation ([address]     Cancer ([address]     neuroendocrine tumor; pancreatic cancer    COPD (chronic obstructive pulmonary disease) ([address]     Depression     Gout     Hyperlipidemia     Hypertension     PTSD (post-traumatic stress disorder)          SURGICAL HISTORY:      Past Surgical History:   Procedure Laterality Date    BACK SURGERY      lumbar laminectomy    CARPAL TUNNEL RELEASE Bilateral     JOINT REPLACEMENT      left knee         CURRENT MEDICATIONS:       Discharge Medication List as of 8/27/2020 9:28 PM      CONTINUE these medications which have NOT CHANGED    Details   allopurinol (ZYLOPRIM) 100 MG tablet Take 200 mg by mouth dailyHistorical Med      atorvastatin (LIPITOR) 40 MG tablet Take 40 mg by mouth nightlyHistorical Med      buPROPion (WELLBUTRIN XL) 150 MG extended release tablet Take 150 mg by mouth every morningHistorical Med      hydrOXYzine (VISTARIL) 25 MG capsule Take 25 mg by mouth 3 times daily as needed for AnxietyHistorical Med      lisinopril (PRINIVIL;ZESTRIL) 5 MG tablet Take 5 mg by mouth dailyHistorical Med      omeprazole (PRILOSEC) 20 MG delayed release capsule Take 20 mg by mouth dailyHistorical Med      sertraline (ZOLOFT) 100 MG tablet Take 100 mg by mouth dailyHistorical Med      aspirin 81 MG tablet Take 81 mg by mouth dailyHistorical Med      senna (SENOKOT) 8.6 MG tablet Take 1 tablet by mouth 2 times dailyHistorical Med      pregabalin (LYRICA) 100 MG capsule Take 100 mg by mouth 2 times daily. Historical Med      oxyCODONE (OXYCONTIN) 20 MG extended release tablet Take 20 mg by mouth every 12 hours. Historical Med      albuterol sulfate  (90 Base) MCG/ACT inhaler Inhale 2 puffs into the lungs every 6 hours as needed for WheezingHistorical Med      metoprolol tartrate (LOPRESSOR) 50 MG tablet Take 25 mg by mouth 2 times daily Historical Med      traZODone (DESYREL) 100 MG tablet Take 100 mg by mouth nightlyHistorical Med               ALLERGIES:    Dye [iodides]    FAMILY HISTORY:     History reviewed. No pertinent family history.        SOCIAL HISTORY:     Social History     Socioeconomic History    Marital status:      Spouse name: None    Number of children: None    Years of education: None    Highest education level: None   Occupational History    None   Social Needs    Financial resource strain: None    Food insecurity     Worry: None     Inability: None    Transportation needs     Medical: None     Non-medical: None   Tobacco Use    Smoking status: Former Smoker    Smokeless tobacco: Never Used   Substance and Sexual Activity    Alcohol use: None    Drug use: None    Sexual activity: None   Lifestyle    Physical activity     Days per week: None     Minutes per session: None    Stress: None   Relationships    Social connections     Talks on phone: None     Gets together: None     Attends Rastafarian service: None     Active member of club or organization: None     Attends meetings of clubs or organizations: None     Relationship status: None    Intimate partner violence     Fear of current or ex partner: None     Emotionally abused: None     Physically abused: None     Forced sexual activity: None   Other Topics Concern    None   Social History Narrative    None       SCREENINGS:    Baker Coma Scale  Eye Opening: Spontaneous  Best Verbal Response: Oriented  Best Motor Response: Obeys commands  Lexx Coma Scale Score: 15        PHYSICAL EXAM:       ED Triage Vitals [08/27/20 2036]   BP Temp Temp Source Pulse Resp SpO2 Height Weight   (!) 137/99 98.4 °F (36.9 °C) Oral 85 17 93 % 6' 1\" (1.854 m) 237 lb (107.5 kg)       Physical Exam    CONSTITUTIONAL: Awake and alert. Cooperative. Well-developed. Well-nourished. Vitals:    08/27/20 2036 08/27/20 2153   BP: (!) 137/99 (!) 146/104   Pulse: 85 72   Resp: 17 22   Temp: 98.4 °F (36.9 °C) 97.9 °F (36.6 °C)   TempSrc: Oral Oral   SpO2: 93% 92%   Weight: 237 lb (107.5 kg)    Height: 6' 1\" (1.854 m)      HENT: Normocephalic. Atraumatic. External ears normal, without discharge. TMs clear bilaterally. Nonasal discharge. Oropharynx clear, no erythema. Mucous membranes moist.  EYES: Conjunctiva non-injected, nolid abnormalities noted. No scleral icterus. PERRL. EOM's grossly intact. Anterior chambers clear. NECK: Supple. Normal ROM. No meningismus. No thyroid tenderness or swelling noted. CARDIOVASCULAR: RRR. No Murmer. No carotid bruits. PULMONARY/CHEST WALL: Effort normal. No tachypnea.  Lungs clear to ausculation. ABDOMEN: Normal BS. Soft. Nondistended. No tenderness to palpation. No guarding. No hernias noted. No splenomegaly. Back: Spine is midline. No ecchymosis. No crepituson palpation. No obvious subluxation of vertebral column. No saddle anesthesia or evidence of cauda equina. /ANORECTAL: Not assessed  MUSKULOSKELETAL: Patient has diffuse tenderness and swelling noted to the left wrist.  2+ radial pulse in left upper extremity. Other extremities are atraumatic and nontender. There is not an obvious deformity present. SKIN: Warm and dry. NEUROLOGICAL:  GCS 15. CN II-XII grossly intact. Strength is 5/5 in all extremities and sensation is intact. PSYCHIATRIC: Normal affect, normal insight and judgement. Alert and oriented x 3. DIAGNOSTIC RESULTS:     LABS:    No results found for this visit on 08/27/20. RADIOLOGY:  All x-ray studies are viewed/reviewed by me. Formal interpretations per the radiologist are as follows:      XR HAND LEFT (MIN 3 VIEWS)   Final Result   Distal radial and ulnar fractures as above. Osteoarthrosis. EKG:  See EKG interpretation by an attending physician. PROCEDURES:   N/A    CRITICAL CARE TIME:   N/A    CONSULTS:  None      EMERGENCY DEPARTMENT COURSE andDIFFERENTIAL DIAGNOSIS/MDM:   Vitals:    Vitals:    08/27/20 2036 08/27/20 2153   BP: (!) 137/99 (!) 146/104   Pulse: 85 72   Resp: 17 22   Temp: 98.4 °F (36.9 °C) 97.9 °F (36.6 °C)   TempSrc: Oral Oral   SpO2: 93% 92%   Weight: 237 lb (107.5 kg)    Height: 6' 1\" (1.854 m)        Patient wasgiven the following medications:  Medications - No data to display      Patient was evaluated independently by myself with the attending physician available for consultation. Patient presented to the emergency room today with complaints of left wrist pain after fall. Patient radiographic imaging showed distal radius and ulnar fracture. Patient had no neurologic deficit.   He is placed in a volar splint. Is instructed to rest, ice, elevate. He was given referral to follow-up with orthopedics    Patient laboratory studies, radiographic imaging, and assessment were all discussed with the patient and/orpatient family. There was shared decision-making between myself as well as the patient and/or their surrogate and we are all in agreement with discharge home. There was an opportunity for questions and all questions were answered tothe best of my ability and to the satisfaction of the patient and/or patient family. FINAL IMPRESSION:      1.  Closed fracture of left wrist, initial encounter          DISPOSITION/PLAN:   DISPOSITION Decision To Discharge      PATIENT REFERRED TO:  Keyla Joseph [address] [facility], [address] [facility] [address] [facility] [address] [place] [address] [facility]  [phone]    Call   For follow up      DISCHARGE MEDICATIONS:  Discharge Medication List as of 8/27/2020  9:28 PM                     (Please note thatportions of this note were completed with a voice recognition program.  Efforts were made to edit the dictations, but occasionally words are mis-transcribed.)    JONATHAN Nelson CNP-C (electronicallysigned)        JONATHAN Nelson CNP  08/29/20 2052

## 2020-09-04 ENCOUNTER — OFFICE VISIT (OUTPATIENT)
Dept: ORTHOPEDIC SURGERY | Age: 75
End: 2020-09-04

## 2020-09-04 VITALS — WEIGHT: 237 LBS | BODY MASS INDEX: 31.41 KG/M2 | HEIGHT: 73 IN

## 2020-09-04 PROCEDURE — 99024 POSTOP FOLLOW-UP VISIT: CPT | Performed by: ORTHOPAEDIC SURGERY

## 2020-09-21 ENCOUNTER — TELEPHONE (OUTPATIENT)
Dept: ORTHOPEDIC SURGERY | Age: 75
End: 2020-09-21

## 2020-09-21 ENCOUNTER — OFFICE VISIT (OUTPATIENT)
Dept: ORTHOPEDIC SURGERY | Age: 75
End: 2020-09-21
Payer: OTHER GOVERNMENT

## 2020-09-21 VITALS — WEIGHT: 237 LBS | BODY MASS INDEX: 31.41 KG/M2 | HEIGHT: 73 IN

## 2020-09-21 PROCEDURE — L3807 WHFO W/O JOINTS PRE CST: HCPCS | Performed by: ORTHOPAEDIC SURGERY

## 2020-09-21 PROCEDURE — 99024 POSTOP FOLLOW-UP VISIT: CPT | Performed by: ORTHOPAEDIC SURGERY

## 2020-09-21 NOTE — PROGRESS NOTES
Chief Complaint   Patient presents with    Wrist Pain     OPSP LEFT WRIST INJURY : HAD A FALL ON 08/21/2020: REF FROM [facility]: UPDATED XR TODAY       HISTORY OF PRESENT ILLNESS:  Lela Blackman is a 70 y.o.  patient here for repeat x-ray evaluation after sustaining a left distal radius fracture 1 month ago, here today for courtesy check from my colleague Dr. Stacey Porras, who is currently out of town. Patient feels that he is doing better. He denies numbness in the hand. His pain is better but not gone. He is hopeful to avoid surgery on the left wrist.    ROS:  ROS neg     Past medical history is reviewed again today, no changes to report    PHYSICAL EXAMINATION:  Patient is alert and pleasant, in no acute distress. Here with his daughter. The affected extremity is examined today. Left wrist reveals good alignment clinically. Swelling is very mild. Palpation at the wrist joint causes mild discomfort. He has some stiffness in the wrist but no guarding. He is able to bend the fingers into the palm nicely. Fingers are warm and sensate. Motor movements are present    X-rays:  3 views, left wrist, impression:  Left distal radius fracture with intra-articular extension. Mild shortening. 10 degree dorsal extension    IMPRESSION AND PLAN: Left distal radius fracture  Doing well after sustaining a left wrist injury treated nonsurgically. X-rays reveal maintained alignment, patient is doing well clinically also. We will continue with our current care plan. Transition to a fracture brace. Procedures    [employer] Short Arm Fracture Brace     Patient was prescribed a [employer]os Short Arm Fracture Brace. The left wrist will require stabilization / immobilization from this semi-rigid / rigid orthosis to improve their function. The orthosis will assist in protecting the affected area, provide functional support and facilitate healing.     The orthosis used a heating element to mold and shape the brace to provide a customizable fit for the patient. The patient was educated and fit by a healthcare professional with expert knowledge and specialization in brace application while under the direct supervision of the treating physician. Verbal and written instructions for the use of and application of this item were provided. They were instructed to contact the office immediately should the brace result in increased pain, decreased sensation, increased swelling or worsening of the condition. Edema control and progressive range of motion of the hand and wrist is permitted. I would avoid pushing off and strengthening of the left wrist at this point until fully healed. He will return with my colleague Dr. Rex Mckeon, in 4 weeks unless needed sooner. X-rays left wrist upon arrival.    I suspect patient will do well long-term without surgery. He is hopeful to avoid surgery. We did discuss the mild displacement of the fracture today. He may have a slight tilt of the wrist long-term but I suspect this will not be a functional hindrance. All questions and concerns were addressed today. Patient is in agreement with the plan. Isak Vargas MD  Hand & Upper Extremity Surgery  [address]  A partner of [facility] ([facility])        Please note that this transcription was created using voice recognition software. Any errors are unintentional and may be due to voice recognition transcription.

## 2020-11-06 ENCOUNTER — TELEPHONE (OUTPATIENT)
Dept: ORTHOPEDIC SURGERY | Age: 75
End: 2020-11-06

## 2020-11-06 NOTE — TELEPHONE ENCOUNTER
I called to check on the patient who was scheduled to see us and he has to cancel as his wife is being admitted into the hospital and he is not sure what is going on. I gave him my direct number as he called scheduling and was placed on hold for several minutes and he hung up as no one ever came back to the phone.

## 2021-01-01 ENCOUNTER — APPOINTMENT (OUTPATIENT)
Dept: GENERAL RADIOLOGY | Age: 76
DRG: 871 | End: 2021-01-01
Payer: OTHER GOVERNMENT

## 2021-01-01 ENCOUNTER — APPOINTMENT (OUTPATIENT)
Dept: ULTRASOUND IMAGING | Age: 76
DRG: 871 | End: 2021-01-01
Payer: OTHER GOVERNMENT

## 2021-01-01 ENCOUNTER — APPOINTMENT (OUTPATIENT)
Dept: CT IMAGING | Age: 76
DRG: 871 | End: 2021-01-01
Payer: OTHER GOVERNMENT

## 2021-01-01 ENCOUNTER — APPOINTMENT (OUTPATIENT)
Dept: INTERVENTIONAL RADIOLOGY/VASCULAR | Age: 76
DRG: 871 | End: 2021-01-01
Payer: OTHER GOVERNMENT

## 2021-01-01 ENCOUNTER — HOSPITAL ENCOUNTER (INPATIENT)
Age: 76
LOS: 6 days | DRG: 871 | End: 2021-12-10
Attending: EMERGENCY MEDICINE | Admitting: INTERNAL MEDICINE
Payer: OTHER GOVERNMENT

## 2021-01-01 VITALS
TEMPERATURE: 97.7 F | OXYGEN SATURATION: 98 % | HEIGHT: 74 IN | HEART RATE: 51 BPM | SYSTOLIC BLOOD PRESSURE: 80 MMHG | RESPIRATION RATE: 22 BRPM | BODY MASS INDEX: 34.79 KG/M2 | WEIGHT: 271.08 LBS | DIASTOLIC BLOOD PRESSURE: 64 MMHG

## 2021-01-01 DIAGNOSIS — R06.00 DYSPNEA, UNSPECIFIED TYPE: Primary | ICD-10-CM

## 2021-01-01 DIAGNOSIS — A41.9 SEPTIC SHOCK (HCC): ICD-10-CM

## 2021-01-01 DIAGNOSIS — N17.9 AKI (ACUTE KIDNEY INJURY) (HCC): ICD-10-CM

## 2021-01-01 DIAGNOSIS — E80.6 HYPERBILIRUBINEMIA: ICD-10-CM

## 2021-01-01 DIAGNOSIS — K81.9 CHOLECYSTITIS: ICD-10-CM

## 2021-01-01 DIAGNOSIS — J44.1 COPD EXACERBATION (HCC): ICD-10-CM

## 2021-01-01 DIAGNOSIS — R09.02 HYPOXIA: ICD-10-CM

## 2021-01-01 DIAGNOSIS — D72.829 LEUKOCYTOSIS, UNSPECIFIED TYPE: ICD-10-CM

## 2021-01-01 DIAGNOSIS — J18.9 PNEUMONIA OF RIGHT LUNG DUE TO INFECTIOUS ORGANISM, UNSPECIFIED PART OF LUNG: ICD-10-CM

## 2021-01-01 DIAGNOSIS — R65.21 SEPTIC SHOCK (HCC): ICD-10-CM

## 2021-01-01 LAB
A/G RATIO: 0.5 (ref 1.1–2.2)
ALBUMIN SERPL-MCNC: 1.9 G/DL (ref 3.4–5)
ALBUMIN SERPL-MCNC: 2 G/DL (ref 3.4–5)
ALBUMIN SERPL-MCNC: 2.1 G/DL (ref 3.4–5)
ALBUMIN SERPL-MCNC: 2.2 G/DL (ref 3.4–5)
ALBUMIN SERPL-MCNC: 2.3 G/DL (ref 3.4–5)
ALBUMIN SERPL-MCNC: 2.4 G/DL (ref 3.4–5)
ALBUMIN SERPL-MCNC: 2.4 G/DL (ref 3.4–5)
ALP BLD-CCNC: 238 U/L (ref 40–129)
ALP BLD-CCNC: 253 U/L (ref 40–129)
ALP BLD-CCNC: 292 U/L (ref 40–129)
ALP BLD-CCNC: 311 U/L (ref 40–129)
ALP BLD-CCNC: 313 U/L (ref 40–129)
ALT SERPL-CCNC: 16 U/L (ref 10–40)
ALT SERPL-CCNC: 19 U/L (ref 10–40)
ALT SERPL-CCNC: 22 U/L (ref 10–40)
ALT SERPL-CCNC: 23 U/L (ref 10–40)
ALT SERPL-CCNC: 26 U/L (ref 10–40)
AMORPHOUS: ABNORMAL /HPF
ANION GAP SERPL CALCULATED.3IONS-SCNC: 10 MMOL/L (ref 3–16)
ANION GAP SERPL CALCULATED.3IONS-SCNC: 11 MMOL/L (ref 3–16)
ANION GAP SERPL CALCULATED.3IONS-SCNC: 12 MMOL/L (ref 3–16)
ANION GAP SERPL CALCULATED.3IONS-SCNC: 12 MMOL/L (ref 3–16)
ANION GAP SERPL CALCULATED.3IONS-SCNC: 13 MMOL/L (ref 3–16)
ANION GAP SERPL CALCULATED.3IONS-SCNC: 13 MMOL/L (ref 3–16)
ANION GAP SERPL CALCULATED.3IONS-SCNC: 14 MMOL/L (ref 3–16)
ANION GAP SERPL CALCULATED.3IONS-SCNC: 15 MMOL/L (ref 3–16)
ANION GAP SERPL CALCULATED.3IONS-SCNC: 16 MMOL/L (ref 3–16)
ANION GAP SERPL CALCULATED.3IONS-SCNC: 9 MMOL/L (ref 3–16)
ANISOCYTOSIS: ABNORMAL
APTT: 44.1 SEC (ref 26.2–38.6)
AST SERPL-CCNC: 23 U/L (ref 15–37)
AST SERPL-CCNC: 26 U/L (ref 15–37)
AST SERPL-CCNC: 27 U/L (ref 15–37)
AST SERPL-CCNC: 28 U/L (ref 15–37)
AST SERPL-CCNC: 28 U/L (ref 15–37)
ATYPICAL LYMPHOCYTE RELATIVE PERCENT: 1 % (ref 0–6)
ATYPICAL LYMPHOCYTE RELATIVE PERCENT: 1 % (ref 0–6)
BACTERIA: ABNORMAL /HPF
BANDED NEUTROPHILS RELATIVE PERCENT: 10 % (ref 0–7)
BANDED NEUTROPHILS RELATIVE PERCENT: 11 % (ref 0–7)
BANDED NEUTROPHILS RELATIVE PERCENT: 12 % (ref 0–7)
BANDED NEUTROPHILS RELATIVE PERCENT: 2 % (ref 0–7)
BANDED NEUTROPHILS RELATIVE PERCENT: 8 % (ref 0–7)
BASE EXCESS ARTERIAL: -11.6 MMOL/L (ref -3–3)
BASE EXCESS ARTERIAL: -2.8 MMOL/L (ref -3–3)
BASE EXCESS ARTERIAL: -4.8 MMOL/L (ref -3–3)
BASE EXCESS ARTERIAL: -5.9 MMOL/L (ref -3–3)
BASE EXCESS ARTERIAL: -9.9 MMOL/L (ref -3–3)
BASOPHILS ABSOLUTE: 0 K/UL (ref 0–0.2)
BASOPHILS ABSOLUTE: 0.1 K/UL (ref 0–0.2)
BASOPHILS RELATIVE PERCENT: 0 %
BASOPHILS RELATIVE PERCENT: 0.3 %
BILIRUB SERPL-MCNC: 1.8 MG/DL (ref 0–1)
BILIRUB SERPL-MCNC: 1.8 MG/DL (ref 0–1)
BILIRUB SERPL-MCNC: 1.9 MG/DL (ref 0–1)
BILIRUB SERPL-MCNC: 2 MG/DL (ref 0–1)
BILIRUB SERPL-MCNC: 2.3 MG/DL (ref 0–1)
BILIRUBIN DIRECT: 1 MG/DL (ref 0–0.3)
BILIRUBIN DIRECT: 1.1 MG/DL (ref 0–0.3)
BILIRUBIN DIRECT: 1.1 MG/DL (ref 0–0.3)
BILIRUBIN DIRECT: 1.3 MG/DL (ref 0–0.3)
BILIRUBIN URINE: ABNORMAL
BILIRUBIN, INDIRECT: 0.7 MG/DL (ref 0–1)
BILIRUBIN, INDIRECT: 0.7 MG/DL (ref 0–1)
BILIRUBIN, INDIRECT: 0.8 MG/DL (ref 0–1)
BILIRUBIN, INDIRECT: 0.8 MG/DL (ref 0–1)
BLOOD CULTURE, ROUTINE: NORMAL
BLOOD, URINE: NEGATIVE
BUN BLDV-MCNC: 101 MG/DL (ref 7–20)
BUN BLDV-MCNC: 109 MG/DL (ref 7–20)
BUN BLDV-MCNC: 115 MG/DL (ref 7–20)
BUN BLDV-MCNC: 32 MG/DL (ref 7–20)
BUN BLDV-MCNC: 38 MG/DL (ref 7–20)
BUN BLDV-MCNC: 46 MG/DL (ref 7–20)
BUN BLDV-MCNC: 59 MG/DL (ref 7–20)
BUN BLDV-MCNC: 68 MG/DL (ref 7–20)
BUN BLDV-MCNC: 75 MG/DL (ref 7–20)
BUN BLDV-MCNC: 86 MG/DL (ref 7–20)
BUN BLDV-MCNC: 91 MG/DL (ref 7–20)
BUN BLDV-MCNC: 96 MG/DL (ref 7–20)
BUN BLDV-MCNC: 96 MG/DL (ref 7–20)
BUN BLDV-MCNC: 97 MG/DL (ref 7–20)
CALCIUM IONIZED: 0.94 MMOL/L (ref 1.12–1.32)
CALCIUM IONIZED: 0.97 MMOL/L (ref 1.12–1.32)
CALCIUM IONIZED: 1 MMOL/L (ref 1.12–1.32)
CALCIUM IONIZED: 1.04 MMOL/L (ref 1.12–1.32)
CALCIUM IONIZED: 1.11 MMOL/L (ref 1.12–1.32)
CALCIUM IONIZED: 1.11 MMOL/L (ref 1.12–1.32)
CALCIUM IONIZED: 1.12 MMOL/L (ref 1.12–1.32)
CALCIUM IONIZED: 1.12 MMOL/L (ref 1.12–1.32)
CALCIUM IONIZED: 1.13 MMOL/L (ref 1.12–1.32)
CALCIUM SERPL-MCNC: 6.9 MG/DL (ref 8.3–10.6)
CALCIUM SERPL-MCNC: 7.2 MG/DL (ref 8.3–10.6)
CALCIUM SERPL-MCNC: 7.5 MG/DL (ref 8.3–10.6)
CALCIUM SERPL-MCNC: 7.5 MG/DL (ref 8.3–10.6)
CALCIUM SERPL-MCNC: 7.8 MG/DL (ref 8.3–10.6)
CALCIUM SERPL-MCNC: 7.9 MG/DL (ref 8.3–10.6)
CALCIUM SERPL-MCNC: 7.9 MG/DL (ref 8.3–10.6)
CALCIUM SERPL-MCNC: 8 MG/DL (ref 8.3–10.6)
CALCIUM SERPL-MCNC: 8.5 MG/DL (ref 8.3–10.6)
CALCIUM SERPL-MCNC: 8.6 MG/DL (ref 8.3–10.6)
CALCIUM SERPL-MCNC: 8.6 MG/DL (ref 8.3–10.6)
CALCIUM SERPL-MCNC: 8.7 MG/DL (ref 8.3–10.6)
CALCIUM SERPL-MCNC: 8.7 MG/DL (ref 8.3–10.6)
CALCIUM SERPL-MCNC: 8.9 MG/DL (ref 8.3–10.6)
CARBOXYHEMOGLOBIN ARTERIAL: 0.6 % (ref 0–1.5)
CARBOXYHEMOGLOBIN ARTERIAL: 0.6 % (ref 0–1.5)
CARBOXYHEMOGLOBIN ARTERIAL: 0.7 % (ref 0–1.5)
CARBOXYHEMOGLOBIN ARTERIAL: 0.8 % (ref 0–1.5)
CARBOXYHEMOGLOBIN ARTERIAL: 0.9 % (ref 0–1.5)
CHLORIDE BLD-SCNC: 100 MMOL/L (ref 99–110)
CHLORIDE BLD-SCNC: 101 MMOL/L (ref 99–110)
CHLORIDE BLD-SCNC: 102 MMOL/L (ref 99–110)
CHLORIDE BLD-SCNC: 97 MMOL/L (ref 99–110)
CHLORIDE BLD-SCNC: 98 MMOL/L (ref 99–110)
CHLORIDE BLD-SCNC: 98 MMOL/L (ref 99–110)
CHLORIDE BLD-SCNC: 99 MMOL/L (ref 99–110)
CLARITY: CLEAR
CO2: 18 MMOL/L (ref 21–32)
CO2: 18 MMOL/L (ref 21–32)
CO2: 19 MMOL/L (ref 21–32)
CO2: 19 MMOL/L (ref 21–32)
CO2: 20 MMOL/L (ref 21–32)
CO2: 21 MMOL/L (ref 21–32)
CO2: 23 MMOL/L (ref 21–32)
CO2: 23 MMOL/L (ref 21–32)
CO2: 24 MMOL/L (ref 21–32)
COLOR: ABNORMAL
CREAT SERPL-MCNC: 1.7 MG/DL (ref 0.8–1.3)
CREAT SERPL-MCNC: 1.8 MG/DL (ref 0.8–1.3)
CREAT SERPL-MCNC: 2 MG/DL (ref 0.8–1.3)
CREAT SERPL-MCNC: 2.4 MG/DL (ref 0.8–1.3)
CREAT SERPL-MCNC: 2.6 MG/DL (ref 0.8–1.3)
CREAT SERPL-MCNC: 3 MG/DL (ref 0.8–1.3)
CREAT SERPL-MCNC: 3.3 MG/DL (ref 0.8–1.3)
CREAT SERPL-MCNC: 3.4 MG/DL (ref 0.8–1.3)
CREAT SERPL-MCNC: 3.5 MG/DL (ref 0.8–1.3)
CREAT SERPL-MCNC: 3.6 MG/DL (ref 0.8–1.3)
CREAT SERPL-MCNC: 3.7 MG/DL (ref 0.8–1.3)
CREAT SERPL-MCNC: 4.3 MG/DL (ref 0.8–1.3)
CULTURE, BLOOD 2: NORMAL
EKG ATRIAL RATE: 65 BPM
EKG DIAGNOSIS: NORMAL
EKG P AXIS: 61 DEGREES
EKG P-R INTERVAL: 208 MS
EKG Q-T INTERVAL: 468 MS
EKG QRS DURATION: 108 MS
EKG QTC CALCULATION (BAZETT): 486 MS
EKG R AXIS: 9 DEGREES
EKG T AXIS: 19 DEGREES
EKG VENTRICULAR RATE: 65 BPM
EOSINOPHILS ABSOLUTE: 0 K/UL (ref 0–0.6)
EOSINOPHILS RELATIVE PERCENT: 0 %
EPITHELIAL CELLS, UA: ABNORMAL /HPF (ref 0–5)
FERRITIN: 1071 NG/ML (ref 30–400)
FOLATE: 3.23 NG/ML (ref 4.78–24.2)
GFR AFRICAN AMERICAN: 16
GFR AFRICAN AMERICAN: 19
GFR AFRICAN AMERICAN: 20
GFR AFRICAN AMERICAN: 21
GFR AFRICAN AMERICAN: 21
GFR AFRICAN AMERICAN: 22
GFR AFRICAN AMERICAN: 25
GFR AFRICAN AMERICAN: 29
GFR AFRICAN AMERICAN: 32
GFR AFRICAN AMERICAN: 40
GFR AFRICAN AMERICAN: 45
GFR AFRICAN AMERICAN: 48
GFR NON-AFRICAN AMERICAN: 14
GFR NON-AFRICAN AMERICAN: 16
GFR NON-AFRICAN AMERICAN: 17
GFR NON-AFRICAN AMERICAN: 18
GFR NON-AFRICAN AMERICAN: 18
GFR NON-AFRICAN AMERICAN: 21
GFR NON-AFRICAN AMERICAN: 24
GFR NON-AFRICAN AMERICAN: 27
GFR NON-AFRICAN AMERICAN: 33
GFR NON-AFRICAN AMERICAN: 37
GFR NON-AFRICAN AMERICAN: 40
GLUCOSE BLD-MCNC: 100 MG/DL (ref 70–99)
GLUCOSE BLD-MCNC: 102 MG/DL (ref 70–99)
GLUCOSE BLD-MCNC: 104 MG/DL (ref 70–99)
GLUCOSE BLD-MCNC: 109 MG/DL (ref 70–99)
GLUCOSE BLD-MCNC: 110 MG/DL (ref 70–99)
GLUCOSE BLD-MCNC: 111 MG/DL (ref 70–99)
GLUCOSE BLD-MCNC: 113 MG/DL (ref 70–99)
GLUCOSE BLD-MCNC: 114 MG/DL (ref 70–99)
GLUCOSE BLD-MCNC: 115 MG/DL (ref 70–99)
GLUCOSE BLD-MCNC: 116 MG/DL (ref 70–99)
GLUCOSE BLD-MCNC: 116 MG/DL (ref 70–99)
GLUCOSE BLD-MCNC: 121 MG/DL (ref 70–99)
GLUCOSE BLD-MCNC: 125 MG/DL (ref 70–99)
GLUCOSE BLD-MCNC: 126 MG/DL (ref 70–99)
GLUCOSE BLD-MCNC: 129 MG/DL (ref 70–99)
GLUCOSE BLD-MCNC: 60 MG/DL (ref 70–99)
GLUCOSE BLD-MCNC: 62 MG/DL (ref 70–99)
GLUCOSE BLD-MCNC: 63 MG/DL (ref 70–99)
GLUCOSE BLD-MCNC: 64 MG/DL (ref 70–99)
GLUCOSE BLD-MCNC: 67 MG/DL (ref 70–99)
GLUCOSE BLD-MCNC: 70 MG/DL (ref 70–99)
GLUCOSE BLD-MCNC: 74 MG/DL (ref 70–99)
GLUCOSE BLD-MCNC: 76 MG/DL (ref 70–99)
GLUCOSE BLD-MCNC: 76 MG/DL (ref 70–99)
GLUCOSE BLD-MCNC: 80 MG/DL (ref 70–99)
GLUCOSE BLD-MCNC: 84 MG/DL (ref 70–99)
GLUCOSE BLD-MCNC: 84 MG/DL (ref 70–99)
GLUCOSE BLD-MCNC: 86 MG/DL (ref 70–99)
GLUCOSE BLD-MCNC: 87 MG/DL (ref 70–99)
GLUCOSE BLD-MCNC: 88 MG/DL (ref 70–99)
GLUCOSE BLD-MCNC: 89 MG/DL (ref 70–99)
GLUCOSE BLD-MCNC: 91 MG/DL (ref 70–99)
GLUCOSE BLD-MCNC: 92 MG/DL (ref 70–99)
GLUCOSE BLD-MCNC: 92 MG/DL (ref 70–99)
GLUCOSE BLD-MCNC: 93 MG/DL (ref 70–99)
GLUCOSE BLD-MCNC: 96 MG/DL (ref 70–99)
GLUCOSE URINE: NEGATIVE MG/DL
HBV SURFACE AB TITR SER: <3.5 MIU/ML
HCO3 ARTERIAL: 18.3 MMOL/L (ref 21–29)
HCO3 ARTERIAL: 18.5 MMOL/L (ref 21–29)
HCO3 ARTERIAL: 18.8 MMOL/L (ref 21–29)
HCO3 ARTERIAL: 20.9 MMOL/L (ref 21–29)
HCO3 ARTERIAL: 22.4 MMOL/L (ref 21–29)
HCT VFR BLD CALC: 36.5 % (ref 40.5–52.5)
HCT VFR BLD CALC: 37.1 % (ref 40.5–52.5)
HCT VFR BLD CALC: 37.5 % (ref 40.5–52.5)
HCT VFR BLD CALC: 37.6 % (ref 40.5–52.5)
HCT VFR BLD CALC: 38 % (ref 40.5–52.5)
HCT VFR BLD CALC: 38.6 % (ref 40.5–52.5)
HCT VFR BLD CALC: 39.4 % (ref 40.5–52.5)
HEMATOLOGY PATH CONSULT: NO
HEMATOLOGY PATH CONSULT: NORMAL
HEMATOLOGY PATH CONSULT: NORMAL
HEMATOLOGY PATH CONSULT: YES
HEMATOLOGY PATH CONSULT: YES
HEMOGLOBIN, ART, EXTENDED: 13 G/DL (ref 13.5–17.5)
HEMOGLOBIN, ART, EXTENDED: 13 G/DL (ref 13.5–17.5)
HEMOGLOBIN, ART, EXTENDED: 13.2 G/DL (ref 13.5–17.5)
HEMOGLOBIN, ART, EXTENDED: 14.1 G/DL (ref 13.5–17.5)
HEMOGLOBIN, ART, EXTENDED: 14.5 G/DL (ref 13.5–17.5)
HEMOGLOBIN: 12 G/DL (ref 13.5–17.5)
HEMOGLOBIN: 12.3 G/DL (ref 13.5–17.5)
HEMOGLOBIN: 12.4 G/DL (ref 13.5–17.5)
HEMOGLOBIN: 12.5 G/DL (ref 13.5–17.5)
HEMOGLOBIN: 12.6 G/DL (ref 13.5–17.5)
HEMOGLOBIN: 12.9 G/DL (ref 13.5–17.5)
HEMOGLOBIN: 13 G/DL (ref 13.5–17.5)
HEPATITIS B CORE TOTAL ANTIBODY: NEGATIVE
HEPATITIS B SURFACE ANTIGEN INTERPRETATION: NORMAL
HYPOCHROMIA: ABNORMAL
INR BLD: 1.45 (ref 0.88–1.12)
INR BLD: 1.73 (ref 0.88–1.12)
INR BLD: 1.75 (ref 0.88–1.12)
IRON SATURATION: 35 % (ref 20–50)
IRON: 56 UG/DL (ref 59–158)
KETONES, URINE: ABNORMAL MG/DL
LACTIC ACID, SEPSIS: 0.9 MMOL/L (ref 0.4–1.9)
LACTIC ACID: 1.3 MMOL/L (ref 0.4–2)
LACTIC ACID: 1.3 MMOL/L (ref 0.4–2)
LACTIC ACID: 1.5 MMOL/L (ref 0.4–2)
LEUKOCYTE ESTERASE, URINE: NEGATIVE
LIPASE: 30 U/L (ref 13–60)
LIPASE: 42 U/L (ref 13–60)
LYMPHOCYTES ABSOLUTE: 0 K/UL (ref 1–5.1)
LYMPHOCYTES ABSOLUTE: 0 K/UL (ref 1–5.1)
LYMPHOCYTES ABSOLUTE: 0.6 K/UL (ref 1–5.1)
LYMPHOCYTES ABSOLUTE: 0.9 K/UL (ref 1–5.1)
LYMPHOCYTES ABSOLUTE: 1.4 K/UL (ref 1–5.1)
LYMPHOCYTES ABSOLUTE: 1.9 K/UL (ref 1–5.1)
LYMPHOCYTES RELATIVE PERCENT: 0 %
LYMPHOCYTES RELATIVE PERCENT: 0 %
LYMPHOCYTES RELATIVE PERCENT: 2 %
LYMPHOCYTES RELATIVE PERCENT: 2.6 %
LYMPHOCYTES RELATIVE PERCENT: 3 %
LYMPHOCYTES RELATIVE PERCENT: 4 %
MACROCYTES: ABNORMAL
MAGNESIUM: 1.6 MG/DL (ref 1.8–2.4)
MAGNESIUM: 2 MG/DL (ref 1.8–2.4)
MAGNESIUM: 2.1 MG/DL (ref 1.8–2.4)
MAGNESIUM: 2.1 MG/DL (ref 1.8–2.4)
MAGNESIUM: 2.2 MG/DL (ref 1.8–2.4)
MAGNESIUM: 2.3 MG/DL (ref 1.8–2.4)
MCH RBC QN AUTO: 36.1 PG (ref 26–34)
MCH RBC QN AUTO: 36.3 PG (ref 26–34)
MCH RBC QN AUTO: 36.5 PG (ref 26–34)
MCH RBC QN AUTO: 36.7 PG (ref 26–34)
MCH RBC QN AUTO: 37 PG (ref 26–34)
MCH RBC QN AUTO: 37 PG (ref 26–34)
MCH RBC QN AUTO: 37.5 PG (ref 26–34)
MCHC RBC AUTO-ENTMCNC: 32 G/DL (ref 31–36)
MCHC RBC AUTO-ENTMCNC: 32.6 G/DL (ref 31–36)
MCHC RBC AUTO-ENTMCNC: 33.1 G/DL (ref 31–36)
MCHC RBC AUTO-ENTMCNC: 33.1 G/DL (ref 31–36)
MCHC RBC AUTO-ENTMCNC: 33.4 G/DL (ref 31–36)
MCHC RBC AUTO-ENTMCNC: 33.7 G/DL (ref 31–36)
MCHC RBC AUTO-ENTMCNC: 33.8 G/DL (ref 31–36)
MCV RBC AUTO: 108.4 FL (ref 80–100)
MCV RBC AUTO: 108.8 FL (ref 80–100)
MCV RBC AUTO: 109.3 FL (ref 80–100)
MCV RBC AUTO: 111 FL (ref 80–100)
MCV RBC AUTO: 111.9 FL (ref 80–100)
MCV RBC AUTO: 113.4 FL (ref 80–100)
MCV RBC AUTO: 113.6 FL (ref 80–100)
METAMYELOCYTES RELATIVE PERCENT: 1 %
METAMYELOCYTES RELATIVE PERCENT: 1 %
METHEMOGLOBIN ARTERIAL: 0.3 %
METHEMOGLOBIN ARTERIAL: 0.3 %
METHEMOGLOBIN ARTERIAL: 0.4 %
METHEMOGLOBIN ARTERIAL: 0.7 %
METHEMOGLOBIN ARTERIAL: 0.8 %
MICROSCOPIC EXAMINATION: YES
MONOCYTES ABSOLUTE: 0.7 K/UL (ref 0–1.3)
MONOCYTES ABSOLUTE: 1.1 K/UL (ref 0–1.3)
MONOCYTES ABSOLUTE: 2.2 K/UL (ref 0–1.3)
MONOCYTES ABSOLUTE: 2.8 K/UL (ref 0–1.3)
MONOCYTES ABSOLUTE: 3.4 K/UL (ref 0–1.3)
MONOCYTES ABSOLUTE: 4.5 K/UL (ref 0–1.3)
MONOCYTES RELATIVE PERCENT: 12 %
MONOCYTES RELATIVE PERCENT: 3 %
MONOCYTES RELATIVE PERCENT: 3.4 %
MONOCYTES RELATIVE PERCENT: 6 %
MONOCYTES RELATIVE PERCENT: 6 %
MONOCYTES RELATIVE PERCENT: 9 %
MRSA SCREEN RT-PCR: NORMAL
MYELOCYTE PERCENT: 1 %
MYELOCYTE PERCENT: 1 %
MYELOCYTE PERCENT: 8 %
NEUTROPHILS ABSOLUTE: 19.8 K/UL (ref 1.7–7.7)
NEUTROPHILS ABSOLUTE: 30.8 K/UL (ref 1.7–7.7)
NEUTROPHILS ABSOLUTE: 32.4 K/UL (ref 1.7–7.7)
NEUTROPHILS ABSOLUTE: 34.4 K/UL (ref 1.7–7.7)
NEUTROPHILS ABSOLUTE: 35.6 K/UL (ref 1.7–7.7)
NEUTROPHILS ABSOLUTE: 43.2 K/UL (ref 1.7–7.7)
NEUTROPHILS RELATIVE PERCENT: 71 %
NEUTROPHILS RELATIVE PERCENT: 80 %
NEUTROPHILS RELATIVE PERCENT: 81 %
NEUTROPHILS RELATIVE PERCENT: 82 %
NEUTROPHILS RELATIVE PERCENT: 84 %
NEUTROPHILS RELATIVE PERCENT: 93.7 %
NITRITE, URINE: NEGATIVE
O2 SAT, ARTERIAL: 95.8 %
O2 SAT, ARTERIAL: 96.4 %
O2 SAT, ARTERIAL: 97.5 %
O2 SAT, ARTERIAL: 97.8 %
O2 SAT, ARTERIAL: 98.2 %
O2 THERAPY: ABNORMAL
OCCULT BLOOD SCREENING: NORMAL
PCO2 ARTERIAL: 31.9 MMHG (ref 35–45)
PCO2 ARTERIAL: 40.3 MMHG (ref 35–45)
PCO2 ARTERIAL: 40.7 MMHG (ref 35–45)
PCO2 ARTERIAL: 50.1 MMHG (ref 35–45)
PCO2 ARTERIAL: 63.7 MMHG (ref 35–45)
PDW BLD-RTO: 14.9 % (ref 12.4–15.4)
PDW BLD-RTO: 15.2 % (ref 12.4–15.4)
PDW BLD-RTO: 15.5 % (ref 12.4–15.4)
PDW BLD-RTO: 15.6 % (ref 12.4–15.4)
PDW BLD-RTO: 15.8 % (ref 12.4–15.4)
PERFORMED ON: ABNORMAL
PERFORMED ON: NORMAL
PH ARTERIAL: 7.09 (ref 7.35–7.45)
PH ARTERIAL: 7.18 (ref 7.35–7.45)
PH ARTERIAL: 7.33 (ref 7.35–7.45)
PH ARTERIAL: 7.36 (ref 7.35–7.45)
PH ARTERIAL: 7.38 (ref 7.35–7.45)
PH UA: 5 (ref 5–8)
PH VENOUS: 7.26 (ref 7.35–7.45)
PH VENOUS: 7.34 (ref 7.35–7.45)
PH VENOUS: 7.35 (ref 7.35–7.45)
PH VENOUS: 7.36 (ref 7.35–7.45)
PH VENOUS: 7.37 (ref 7.35–7.45)
PH VENOUS: 7.39 (ref 7.35–7.45)
PHOSPHORUS: 2.8 MG/DL (ref 2.5–4.9)
PHOSPHORUS: 3.1 MG/DL (ref 2.5–4.9)
PHOSPHORUS: 3.1 MG/DL (ref 2.5–4.9)
PHOSPHORUS: 3.6 MG/DL (ref 2.5–4.9)
PHOSPHORUS: 3.7 MG/DL (ref 2.5–4.9)
PHOSPHORUS: 3.9 MG/DL (ref 2.5–4.9)
PHOSPHORUS: 4 MG/DL (ref 2.5–4.9)
PHOSPHORUS: 4.8 MG/DL (ref 2.5–4.9)
PHOSPHORUS: 5.9 MG/DL (ref 2.5–4.9)
PHOSPHORUS: 6.2 MG/DL (ref 2.5–4.9)
PHOSPHORUS: 8.4 MG/DL (ref 2.5–4.9)
PLATELET # BLD: 148 K/UL (ref 135–450)
PLATELET # BLD: 151 K/UL (ref 135–450)
PLATELET # BLD: 176 K/UL (ref 135–450)
PLATELET # BLD: 268 K/UL (ref 135–450)
PLATELET # BLD: 269 K/UL (ref 135–450)
PLATELET # BLD: 286 K/UL (ref 135–450)
PLATELET # BLD: 309 K/UL (ref 135–450)
PLATELET SLIDE REVIEW: ADEQUATE
PLATELET SLIDE REVIEW: ADEQUATE
PMV BLD AUTO: 10.1 FL (ref 5–10.5)
PMV BLD AUTO: 10.4 FL (ref 5–10.5)
PMV BLD AUTO: 10.5 FL (ref 5–10.5)
PMV BLD AUTO: 10.6 FL (ref 5–10.5)
PMV BLD AUTO: 11.9 FL (ref 5–10.5)
PMV BLD AUTO: 9.7 FL (ref 5–10.5)
PMV BLD AUTO: 9.9 FL (ref 5–10.5)
PO2 ARTERIAL: 105.5 MMHG (ref 75–108)
PO2 ARTERIAL: 124.9 MMHG (ref 75–108)
PO2 ARTERIAL: 85.1 MMHG (ref 75–108)
PO2 ARTERIAL: 86.2 MMHG (ref 75–108)
PO2 ARTERIAL: 97.2 MMHG (ref 75–108)
POIKILOCYTES: ABNORMAL
POLYCHROMASIA: ABNORMAL
POTASSIUM SERPL-SCNC: 3.6 MMOL/L (ref 3.5–5.1)
POTASSIUM SERPL-SCNC: 3.6 MMOL/L (ref 3.5–5.1)
POTASSIUM SERPL-SCNC: 3.7 MMOL/L (ref 3.5–5.1)
POTASSIUM SERPL-SCNC: 3.9 MMOL/L (ref 3.5–5.1)
POTASSIUM SERPL-SCNC: 4 MMOL/L (ref 3.5–5.1)
POTASSIUM SERPL-SCNC: 4.1 MMOL/L (ref 3.5–5.1)
POTASSIUM SERPL-SCNC: 4.2 MMOL/L (ref 3.5–5.1)
POTASSIUM SERPL-SCNC: 4.2 MMOL/L (ref 3.5–5.1)
PRO-BNP: 3366 PG/ML (ref 0–449)
PROTEIN UA: ABNORMAL MG/DL
PROTHROMBIN TIME: 16.7 SEC (ref 9.9–12.7)
PROTHROMBIN TIME: 20 SEC (ref 9.9–12.7)
PROTHROMBIN TIME: 20.2 SEC (ref 9.9–12.7)
RBC # BLD: 3.31 M/UL (ref 4.2–5.9)
RBC # BLD: 3.36 M/UL (ref 4.2–5.9)
RBC # BLD: 3.39 M/UL (ref 4.2–5.9)
RBC # BLD: 3.39 M/UL (ref 4.2–5.9)
RBC # BLD: 3.45 M/UL (ref 4.2–5.9)
RBC # BLD: 3.47 M/UL (ref 4.2–5.9)
RBC # BLD: 3.48 M/UL (ref 4.2–5.9)
RBC UA: ABNORMAL /HPF (ref 0–4)
SLIDE REVIEW: ABNORMAL
SODIUM BLD-SCNC: 131 MMOL/L (ref 136–145)
SODIUM BLD-SCNC: 132 MMOL/L (ref 136–145)
SODIUM BLD-SCNC: 133 MMOL/L (ref 136–145)
SODIUM BLD-SCNC: 134 MMOL/L (ref 136–145)
SODIUM BLD-SCNC: 135 MMOL/L (ref 136–145)
SODIUM BLD-SCNC: 135 MMOL/L (ref 136–145)
SODIUM BLD-SCNC: 136 MMOL/L (ref 136–145)
SODIUM BLD-SCNC: 136 MMOL/L (ref 136–145)
SPECIFIC GRAVITY UA: >=1.03 (ref 1–1.03)
TCO2 ARTERIAL: 19.3 MMOL/L
TCO2 ARTERIAL: 20 MMOL/L
TCO2 ARTERIAL: 20.8 MMOL/L
TCO2 ARTERIAL: 22.1 MMOL/L
TCO2 ARTERIAL: 23.6 MMOL/L
TOTAL IRON BINDING CAPACITY: 161 UG/DL (ref 260–445)
TOTAL PROTEIN: 6.1 G/DL (ref 6.4–8.2)
TOTAL PROTEIN: 6.4 G/DL (ref 6.4–8.2)
TOTAL PROTEIN: 6.9 G/DL (ref 6.4–8.2)
TOTAL PROTEIN: 7.2 G/DL (ref 6.4–8.2)
TOTAL PROTEIN: 7.3 G/DL (ref 6.4–8.2)
TOXIC GRANULATION: PRESENT
TRIGL SERPL-MCNC: 135 MG/DL (ref 0–150)
TROPONIN: 0.01 NG/ML
TSH REFLEX FT4: 1.95 UIU/ML (ref 0.27–4.2)
URINE TYPE: ABNORMAL
UROBILINOGEN, URINE: 2 E.U./DL
VANCOMYCIN RANDOM: 15.7 UG/ML
VANCOMYCIN RANDOM: 15.8 UG/ML
VANCOMYCIN RANDOM: 21.1 UG/ML
VITAMIN B-12: >2000 PG/ML (ref 211–911)
WBC # BLD: 21.2 K/UL (ref 4–11)
WBC # BLD: 31.8 K/UL (ref 4–11)
WBC # BLD: 36 K/UL (ref 4–11)
WBC # BLD: 36.7 K/UL (ref 4–11)
WBC # BLD: 37.1 K/UL (ref 4–11)
WBC # BLD: 37.8 K/UL (ref 4–11)
WBC # BLD: 47 K/UL (ref 4–11)
WBC UA: ABNORMAL /HPF (ref 0–5)

## 2021-01-01 PROCEDURE — 85025 COMPLETE CBC W/AUTO DIFF WBC: CPT

## 2021-01-01 PROCEDURE — 2500000003 HC RX 250 WO HCPCS: Performed by: NURSE PRACTITIONER

## 2021-01-01 PROCEDURE — 71045 X-RAY EXAM CHEST 1 VIEW: CPT

## 2021-01-01 PROCEDURE — 93010 ELECTROCARDIOGRAM REPORT: CPT | Performed by: INTERNAL MEDICINE

## 2021-01-01 PROCEDURE — 6360000002 HC RX W HCPCS: Performed by: INTERNAL MEDICINE

## 2021-01-01 PROCEDURE — 2580000003 HC RX 258: Performed by: INTERNAL MEDICINE

## 2021-01-01 PROCEDURE — 84443 ASSAY THYROID STIM HORMONE: CPT

## 2021-01-01 PROCEDURE — 76705 ECHO EXAM OF ABDOMEN: CPT

## 2021-01-01 PROCEDURE — 87075 CULTR BACTERIA EXCEPT BLOOD: CPT

## 2021-01-01 PROCEDURE — 80069 RENAL FUNCTION PANEL: CPT

## 2021-01-01 PROCEDURE — 82746 ASSAY OF FOLIC ACID SERUM: CPT

## 2021-01-01 PROCEDURE — 36592 COLLECT BLOOD FROM PICC: CPT

## 2021-01-01 PROCEDURE — 83540 ASSAY OF IRON: CPT

## 2021-01-01 PROCEDURE — 0F9430Z DRAINAGE OF GALLBLADDER WITH DRAINAGE DEVICE, PERCUTANEOUS APPROACH: ICD-10-PCS | Performed by: SURGERY

## 2021-01-01 PROCEDURE — 36556 INSERT NON-TUNNEL CV CATH: CPT

## 2021-01-01 PROCEDURE — 6360000004 HC RX CONTRAST MEDICATION: Performed by: RADIOLOGY

## 2021-01-01 PROCEDURE — 2060000000 HC ICU INTERMEDIATE R&B

## 2021-01-01 PROCEDURE — 85027 COMPLETE CBC AUTOMATED: CPT

## 2021-01-01 PROCEDURE — 6360000002 HC RX W HCPCS: Performed by: SURGERY

## 2021-01-01 PROCEDURE — 94761 N-INVAS EAR/PLS OXIMETRY MLT: CPT

## 2021-01-01 PROCEDURE — 99291 CRITICAL CARE FIRST HOUR: CPT | Performed by: INTERNAL MEDICINE

## 2021-01-01 PROCEDURE — 80202 ASSAY OF VANCOMYCIN: CPT

## 2021-01-01 PROCEDURE — 90935 HEMODIALYSIS ONE EVALUATION: CPT

## 2021-01-01 PROCEDURE — 99232 SBSQ HOSP IP/OBS MODERATE 35: CPT | Performed by: SURGERY

## 2021-01-01 PROCEDURE — 94003 VENT MGMT INPAT SUBQ DAY: CPT

## 2021-01-01 PROCEDURE — 81001 URINALYSIS AUTO W/SCOPE: CPT

## 2021-01-01 PROCEDURE — 93005 ELECTROCARDIOGRAM TRACING: CPT | Performed by: EMERGENCY MEDICINE

## 2021-01-01 PROCEDURE — C9113 INJ PANTOPRAZOLE SODIUM, VIA: HCPCS | Performed by: INTERNAL MEDICINE

## 2021-01-01 PROCEDURE — 96374 THER/PROPH/DIAG INJ IV PUSH: CPT

## 2021-01-01 PROCEDURE — 6370000000 HC RX 637 (ALT 250 FOR IP): Performed by: NURSE PRACTITIONER

## 2021-01-01 PROCEDURE — 87186 SC STD MICRODIL/AGAR DIL: CPT

## 2021-01-01 PROCEDURE — 2500000003 HC RX 250 WO HCPCS: Performed by: HOSPITALIST

## 2021-01-01 PROCEDURE — C1752 CATH,HEMODIALYSIS,SHORT-TERM: HCPCS

## 2021-01-01 PROCEDURE — 82803 BLOOD GASES ANY COMBINATION: CPT

## 2021-01-01 PROCEDURE — 36620 INSERTION CATHETER ARTERY: CPT

## 2021-01-01 PROCEDURE — 51702 INSERT TEMP BLADDER CATH: CPT

## 2021-01-01 PROCEDURE — 94002 VENT MGMT INPAT INIT DAY: CPT

## 2021-01-01 PROCEDURE — 6370000000 HC RX 637 (ALT 250 FOR IP): Performed by: INTERNAL MEDICINE

## 2021-01-01 PROCEDURE — APPNB45 APP NON BILLABLE 31-45 MINUTES: Performed by: CLINICAL NURSE SPECIALIST

## 2021-01-01 PROCEDURE — 2500000003 HC RX 250 WO HCPCS: Performed by: INTERNAL MEDICINE

## 2021-01-01 PROCEDURE — 87340 HEPATITIS B SURFACE AG IA: CPT

## 2021-01-01 PROCEDURE — 0BH17EZ INSERTION OF ENDOTRACHEAL AIRWAY INTO TRACHEA, VIA NATURAL OR ARTIFICIAL OPENING: ICD-10-PCS | Performed by: INTERNAL MEDICINE

## 2021-01-01 PROCEDURE — 3E1M39Z IRRIGATION OF PERITONEAL CAVITY USING DIALYSATE, PERCUTANEOUS APPROACH: ICD-10-PCS | Performed by: INTERNAL MEDICINE

## 2021-01-01 PROCEDURE — 80076 HEPATIC FUNCTION PANEL: CPT

## 2021-01-01 PROCEDURE — 36415 COLL VENOUS BLD VENIPUNCTURE: CPT

## 2021-01-01 PROCEDURE — 2700000000 HC OXYGEN THERAPY PER DAY

## 2021-01-01 PROCEDURE — 83605 ASSAY OF LACTIC ACID: CPT

## 2021-01-01 PROCEDURE — 82270 OCCULT BLOOD FECES: CPT

## 2021-01-01 PROCEDURE — 86704 HEP B CORE ANTIBODY TOTAL: CPT

## 2021-01-01 PROCEDURE — 74176 CT ABD & PELVIS W/O CONTRAST: CPT

## 2021-01-01 PROCEDURE — 2709999900 CT PERC CHOLECYSTOSTOMY

## 2021-01-01 PROCEDURE — 84478 ASSAY OF TRIGLYCERIDES: CPT

## 2021-01-01 PROCEDURE — 2580000003 HC RX 258: Performed by: SURGERY

## 2021-01-01 PROCEDURE — 2000000000 HC ICU R&B

## 2021-01-01 PROCEDURE — C1769 GUIDE WIRE: HCPCS

## 2021-01-01 PROCEDURE — 82330 ASSAY OF CALCIUM: CPT

## 2021-01-01 PROCEDURE — 87077 CULTURE AEROBIC IDENTIFY: CPT

## 2021-01-01 PROCEDURE — 83880 ASSAY OF NATRIURETIC PEPTIDE: CPT

## 2021-01-01 PROCEDURE — 99223 1ST HOSP IP/OBS HIGH 75: CPT | Performed by: SURGERY

## 2021-01-01 PROCEDURE — 05HM33Z INSERTION OF INFUSION DEVICE INTO RIGHT INTERNAL JUGULAR VEIN, PERCUTANEOUS APPROACH: ICD-10-PCS | Performed by: STUDENT IN AN ORGANIZED HEALTH CARE EDUCATION/TRAINING PROGRAM

## 2021-01-01 PROCEDURE — 85610 PROTHROMBIN TIME: CPT

## 2021-01-01 PROCEDURE — 82728 ASSAY OF FERRITIN: CPT

## 2021-01-01 PROCEDURE — 83690 ASSAY OF LIPASE: CPT

## 2021-01-01 PROCEDURE — APPSS45 APP SPLIT SHARED TIME 31-45 MINUTES: Performed by: CLINICAL NURSE SPECIALIST

## 2021-01-01 PROCEDURE — 87641 MR-STAPH DNA AMP PROBE: CPT

## 2021-01-01 PROCEDURE — 31500 INSERT EMERGENCY AIRWAY: CPT

## 2021-01-01 PROCEDURE — 84484 ASSAY OF TROPONIN QUANT: CPT

## 2021-01-01 PROCEDURE — 80053 COMPREHEN METABOLIC PANEL: CPT

## 2021-01-01 PROCEDURE — 05HM33Z INSERTION OF INFUSION DEVICE INTO RIGHT INTERNAL JUGULAR VEIN, PERCUTANEOUS APPROACH: ICD-10-PCS | Performed by: INTERNAL MEDICINE

## 2021-01-01 PROCEDURE — 77012 CT SCAN FOR NEEDLE BIOPSY: CPT

## 2021-01-01 PROCEDURE — 80048 BASIC METABOLIC PNL TOTAL CA: CPT

## 2021-01-01 PROCEDURE — 6360000002 HC RX W HCPCS: Performed by: NURSE PRACTITIONER

## 2021-01-01 PROCEDURE — 96360 HYDRATION IV INFUSION INIT: CPT

## 2021-01-01 PROCEDURE — 2580000003 HC RX 258: Performed by: NURSE PRACTITIONER

## 2021-01-01 PROCEDURE — 6360000002 HC RX W HCPCS: Performed by: RADIOLOGY

## 2021-01-01 PROCEDURE — 90945 DIALYSIS ONE EVALUATION: CPT

## 2021-01-01 PROCEDURE — 31500 INSERT EMERGENCY AIRWAY: CPT | Performed by: NURSE PRACTITIONER

## 2021-01-01 PROCEDURE — 83735 ASSAY OF MAGNESIUM: CPT

## 2021-01-01 PROCEDURE — 99233 SBSQ HOSP IP/OBS HIGH 50: CPT | Performed by: SURGERY

## 2021-01-01 PROCEDURE — 5A1945Z RESPIRATORY VENTILATION, 24-96 CONSECUTIVE HOURS: ICD-10-PCS | Performed by: INTERNAL MEDICINE

## 2021-01-01 PROCEDURE — 86706 HEP B SURFACE ANTIBODY: CPT

## 2021-01-01 PROCEDURE — 96361 HYDRATE IV INFUSION ADD-ON: CPT

## 2021-01-01 PROCEDURE — APPNB30 APP NON BILLABLE TIME 0-30 MINS: Performed by: NURSE PRACTITIONER

## 2021-01-01 PROCEDURE — 99285 EMERGENCY DEPT VISIT HI MDM: CPT

## 2021-01-01 PROCEDURE — 87070 CULTURE OTHR SPECIMN AEROBIC: CPT

## 2021-01-01 PROCEDURE — 2580000003 HC RX 258: Performed by: EMERGENCY MEDICINE

## 2021-01-01 PROCEDURE — 99231 SBSQ HOSP IP/OBS SF/LOW 25: CPT | Performed by: SURGERY

## 2021-01-01 PROCEDURE — A9579 GAD-BASE MR CONTRAST NOS,1ML: HCPCS | Performed by: RADIOLOGY

## 2021-01-01 PROCEDURE — 74018 RADEX ABDOMEN 1 VIEW: CPT

## 2021-01-01 PROCEDURE — 76937 US GUIDE VASCULAR ACCESS: CPT

## 2021-01-01 PROCEDURE — 85730 THROMBOPLASTIN TIME PARTIAL: CPT

## 2021-01-01 PROCEDURE — 6360000002 HC RX W HCPCS

## 2021-01-01 PROCEDURE — 87040 BLOOD CULTURE FOR BACTERIA: CPT

## 2021-01-01 PROCEDURE — APPNB45 APP NON BILLABLE 31-45 MINUTES: Performed by: NURSE PRACTITIONER

## 2021-01-01 PROCEDURE — 5A1D70Z PERFORMANCE OF URINARY FILTRATION, INTERMITTENT, LESS THAN 6 HOURS PER DAY: ICD-10-PCS | Performed by: INTERNAL MEDICINE

## 2021-01-01 PROCEDURE — 87205 SMEAR GRAM STAIN: CPT

## 2021-01-01 PROCEDURE — 82607 VITAMIN B-12: CPT

## 2021-01-01 PROCEDURE — 77001 FLUOROGUIDE FOR VEIN DEVICE: CPT

## 2021-01-01 PROCEDURE — 83550 IRON BINDING TEST: CPT

## 2021-01-01 RX ORDER — PROPOFOL 10 MG/ML
5-50 INJECTION, EMULSION INTRAVENOUS
Status: DISCONTINUED | OUTPATIENT
Start: 2021-01-01 | End: 2021-01-01

## 2021-01-01 RX ORDER — MIDAZOLAM HYDROCHLORIDE 1 MG/ML
2 INJECTION INTRAMUSCULAR; INTRAVENOUS ONCE
Status: COMPLETED | OUTPATIENT
Start: 2021-01-01 | End: 2021-01-01

## 2021-01-01 RX ORDER — MORPHINE SULFATE 2 MG/ML
2 INJECTION, SOLUTION INTRAMUSCULAR; INTRAVENOUS ONCE
Status: COMPLETED | OUTPATIENT
Start: 2021-01-01 | End: 2021-01-01

## 2021-01-01 RX ORDER — DEXTROSE MONOHYDRATE 50 MG/ML
100 INJECTION, SOLUTION INTRAVENOUS PRN
Status: DISCONTINUED | OUTPATIENT
Start: 2021-01-01 | End: 2021-01-01 | Stop reason: HOSPADM

## 2021-01-01 RX ORDER — SODIUM CHLORIDE 0.9 % (FLUSH) 0.9 %
5-40 SYRINGE (ML) INJECTION PRN
Status: DISCONTINUED | OUTPATIENT
Start: 2021-01-01 | End: 2021-01-01 | Stop reason: HOSPADM

## 2021-01-01 RX ORDER — FENTANYL CITRATE 50 UG/ML
25 INJECTION, SOLUTION INTRAMUSCULAR; INTRAVENOUS
Status: DISCONTINUED | OUTPATIENT
Start: 2021-01-01 | End: 2021-01-01 | Stop reason: HOSPADM

## 2021-01-01 RX ORDER — SODIUM CHLORIDE 9 MG/ML
25 INJECTION, SOLUTION INTRAVENOUS PRN
Status: DISCONTINUED | OUTPATIENT
Start: 2021-01-01 | End: 2021-01-01 | Stop reason: HOSPADM

## 2021-01-01 RX ORDER — SODIUM CHLORIDE 9 MG/ML
1000 INJECTION, SOLUTION INTRAVENOUS ONCE
Status: COMPLETED | OUTPATIENT
Start: 2021-01-01 | End: 2021-01-01

## 2021-01-01 RX ORDER — IPRATROPIUM BROMIDE AND ALBUTEROL SULFATE 2.5; .5 MG/3ML; MG/3ML
1 SOLUTION RESPIRATORY (INHALATION) EVERY 4 HOURS PRN
Status: DISCONTINUED | OUTPATIENT
Start: 2021-01-01 | End: 2021-01-01 | Stop reason: HOSPADM

## 2021-01-01 RX ORDER — ATORVASTATIN CALCIUM 40 MG/1
40 TABLET, FILM COATED ORAL NIGHTLY
Status: DISCONTINUED | OUTPATIENT
Start: 2021-01-01 | End: 2021-01-01 | Stop reason: HOSPADM

## 2021-01-01 RX ORDER — ASPIRIN 81 MG/1
81 TABLET ORAL DAILY
Status: DISCONTINUED | OUTPATIENT
Start: 2021-01-01 | End: 2021-01-01 | Stop reason: HOSPADM

## 2021-01-01 RX ORDER — CARBOXYMETHYLCELLULOSE SODIUM 10 MG/ML
1 GEL OPHTHALMIC
Status: DISCONTINUED | OUTPATIENT
Start: 2021-01-01 | End: 2021-01-01 | Stop reason: HOSPADM

## 2021-01-01 RX ORDER — 0.9 % SODIUM CHLORIDE 0.9 %
1000 INTRAVENOUS SOLUTION INTRAVENOUS ONCE
Status: COMPLETED | OUTPATIENT
Start: 2021-01-01 | End: 2021-01-01

## 2021-01-01 RX ORDER — LORAZEPAM 2 MG/ML
0.5 INJECTION INTRAMUSCULAR
Status: DISCONTINUED | OUTPATIENT
Start: 2021-01-01 | End: 2021-01-01 | Stop reason: HOSPADM

## 2021-01-01 RX ORDER — 0.9 % SODIUM CHLORIDE 0.9 %
30 INTRAVENOUS SOLUTION INTRAVENOUS ONCE
Status: COMPLETED | OUTPATIENT
Start: 2021-01-01 | End: 2021-01-01

## 2021-01-01 RX ORDER — NICOTINE POLACRILEX 4 MG
15 LOZENGE BUCCAL PRN
Status: DISCONTINUED | OUTPATIENT
Start: 2021-01-01 | End: 2021-01-01 | Stop reason: HOSPADM

## 2021-01-01 RX ORDER — METHYLPREDNISOLONE SODIUM SUCCINATE 125 MG/2ML
125 INJECTION, POWDER, LYOPHILIZED, FOR SOLUTION INTRAMUSCULAR; INTRAVENOUS ONCE
Status: COMPLETED | OUTPATIENT
Start: 2021-01-01 | End: 2021-01-01

## 2021-01-01 RX ORDER — DEXTROSE MONOHYDRATE 25 G/50ML
12.5 INJECTION, SOLUTION INTRAVENOUS PRN
Status: DISCONTINUED | OUTPATIENT
Start: 2021-01-01 | End: 2021-01-01 | Stop reason: HOSPADM

## 2021-01-01 RX ORDER — FENTANYL CITRATE 50 UG/ML
INJECTION, SOLUTION INTRAMUSCULAR; INTRAVENOUS
Status: COMPLETED | OUTPATIENT
Start: 2021-01-01 | End: 2021-01-01

## 2021-01-01 RX ORDER — PANTOPRAZOLE SODIUM 40 MG/10ML
40 INJECTION, POWDER, LYOPHILIZED, FOR SOLUTION INTRAVENOUS DAILY
Status: DISCONTINUED | OUTPATIENT
Start: 2021-01-01 | End: 2021-01-01 | Stop reason: HOSPADM

## 2021-01-01 RX ORDER — ACETAMINOPHEN 650 MG/1
650 SUPPOSITORY RECTAL EVERY 6 HOURS PRN
Status: DISCONTINUED | OUTPATIENT
Start: 2021-01-01 | End: 2021-01-01 | Stop reason: HOSPADM

## 2021-01-01 RX ORDER — DEXTROSE MONOHYDRATE 25 G/50ML
25 INJECTION, SOLUTION INTRAVENOUS PRN
Status: DISCONTINUED | OUTPATIENT
Start: 2021-01-01 | End: 2021-01-01 | Stop reason: HOSPADM

## 2021-01-01 RX ORDER — MORPHINE SULFATE 2 MG/ML
2 INJECTION, SOLUTION INTRAMUSCULAR; INTRAVENOUS
Status: DISCONTINUED | OUTPATIENT
Start: 2021-01-01 | End: 2021-01-01 | Stop reason: HOSPADM

## 2021-01-01 RX ORDER — HEPARIN SODIUM 5000 [USP'U]/ML
5000 INJECTION, SOLUTION INTRAVENOUS; SUBCUTANEOUS EVERY 8 HOURS SCHEDULED
Status: DISCONTINUED | OUTPATIENT
Start: 2021-01-01 | End: 2021-01-01 | Stop reason: HOSPADM

## 2021-01-01 RX ORDER — ACETAMINOPHEN 325 MG/1
650 TABLET ORAL EVERY 6 HOURS PRN
Status: DISCONTINUED | OUTPATIENT
Start: 2021-01-01 | End: 2021-01-01 | Stop reason: HOSPADM

## 2021-01-01 RX ORDER — SODIUM CHLORIDE, SODIUM LACTATE, POTASSIUM CHLORIDE, AND CALCIUM CHLORIDE .6; .31; .03; .02 G/100ML; G/100ML; G/100ML; G/100ML
3000 INJECTION, SOLUTION INTRAVENOUS ONCE
Status: DISCONTINUED | OUTPATIENT
Start: 2021-01-01 | End: 2021-01-01

## 2021-01-01 RX ORDER — SODIUM CHLORIDE 9 MG/ML
1000 INJECTION, SOLUTION INTRAVENOUS CONTINUOUS
Status: DISCONTINUED | OUTPATIENT
Start: 2021-01-01 | End: 2021-01-01

## 2021-01-01 RX ORDER — SODIUM CHLORIDE 0.9 % (FLUSH) 0.9 %
5-40 SYRINGE (ML) INJECTION EVERY 12 HOURS SCHEDULED
Status: DISCONTINUED | OUTPATIENT
Start: 2021-01-01 | End: 2021-01-01 | Stop reason: HOSPADM

## 2021-01-01 RX ORDER — CHLORHEXIDINE GLUCONATE 0.12 MG/ML
15 RINSE ORAL 2 TIMES DAILY
Status: DISCONTINUED | OUTPATIENT
Start: 2021-01-01 | End: 2021-01-01 | Stop reason: HOSPADM

## 2021-01-01 RX ORDER — PROPOFOL 10 MG/ML
INJECTION, EMULSION INTRAVENOUS
Status: DISPENSED
Start: 2021-01-01 | End: 2021-01-01

## 2021-01-01 RX ORDER — MIDAZOLAM HYDROCHLORIDE 5 MG/ML
INJECTION INTRAMUSCULAR; INTRAVENOUS
Status: COMPLETED | OUTPATIENT
Start: 2021-01-01 | End: 2021-01-01

## 2021-01-01 RX ORDER — MAGNESIUM SULFATE 1 G/100ML
1000 INJECTION INTRAVENOUS PRN
Status: DISCONTINUED | OUTPATIENT
Start: 2021-01-01 | End: 2021-01-01 | Stop reason: HOSPADM

## 2021-01-01 RX ORDER — CALCIUM GLUCONATE 20 MG/ML
1000 INJECTION, SOLUTION INTRAVENOUS PRN
Status: DISCONTINUED | OUTPATIENT
Start: 2021-01-01 | End: 2021-01-01 | Stop reason: HOSPADM

## 2021-01-01 RX ADMIN — Medication: at 10:32

## 2021-01-01 RX ADMIN — FENTANYL CITRATE 150 MCG/HR: 50 INJECTION, SOLUTION INTRAMUSCULAR; INTRAVENOUS at 07:28

## 2021-01-01 RX ADMIN — PANTOPRAZOLE SODIUM 40 MG: 40 INJECTION, POWDER, FOR SOLUTION INTRAVENOUS at 08:49

## 2021-01-01 RX ADMIN — Medication 10 ML: at 09:49

## 2021-01-01 RX ADMIN — METRONIDAZOLE 500 MG: 500 INJECTION, SOLUTION INTRAVENOUS at 03:28

## 2021-01-01 RX ADMIN — Medication: at 15:45

## 2021-01-01 RX ADMIN — ACETAMINOPHEN 650 MG: 650 SUPPOSITORY RECTAL at 00:49

## 2021-01-01 RX ADMIN — Medication: at 22:59

## 2021-01-01 RX ADMIN — NOREPINEPHRINE BITARTRATE 26 MCG/MIN: 1 INJECTION, SOLUTION, CONCENTRATE INTRAVENOUS at 14:55

## 2021-01-01 RX ADMIN — FENTANYL CITRATE 25 MCG: 50 INJECTION INTRAMUSCULAR; INTRAVENOUS at 16:09

## 2021-01-01 RX ADMIN — NOREPINEPHRINE BITARTRATE 24 MCG/MIN: 1 INJECTION, SOLUTION, CONCENTRATE INTRAVENOUS at 16:15

## 2021-01-01 RX ADMIN — FENTANYL CITRATE 200 MCG/HR: 50 INJECTION, SOLUTION INTRAMUSCULAR; INTRAVENOUS at 13:09

## 2021-01-01 RX ADMIN — Medication 10 ML: at 23:41

## 2021-01-01 RX ADMIN — GADOTERIDOL 10 ML: 279.3 INJECTION, SOLUTION INTRAVENOUS at 17:06

## 2021-01-01 RX ADMIN — Medication 10 ML: at 08:38

## 2021-01-01 RX ADMIN — Medication: at 20:23

## 2021-01-01 RX ADMIN — Medication 10 ML: at 19:07

## 2021-01-01 RX ADMIN — Medication: at 15:00

## 2021-01-01 RX ADMIN — HEPARIN SODIUM 5000 UNITS: 5000 INJECTION INTRAVENOUS; SUBCUTANEOUS at 22:55

## 2021-01-01 RX ADMIN — CEFEPIME HYDROCHLORIDE 1000 MG: 1 INJECTION, POWDER, FOR SOLUTION INTRAMUSCULAR; INTRAVENOUS at 18:45

## 2021-01-01 RX ADMIN — SODIUM CHLORIDE 1000 ML: 9 INJECTION, SOLUTION INTRAVENOUS at 04:16

## 2021-01-01 RX ADMIN — DEXTROSE MONOHYDRATE 12.5 G: 25 INJECTION, SOLUTION INTRAVENOUS at 01:33

## 2021-01-01 RX ADMIN — METRONIDAZOLE 500 MG: 500 INJECTION, SOLUTION INTRAVENOUS at 20:35

## 2021-01-01 RX ADMIN — Medication: at 12:30

## 2021-01-01 RX ADMIN — HEPARIN SODIUM 5000 UNITS: 5000 INJECTION INTRAVENOUS; SUBCUTANEOUS at 14:55

## 2021-01-01 RX ADMIN — MEROPENEM 1000 MG: 1 INJECTION, POWDER, FOR SOLUTION INTRAVENOUS at 01:29

## 2021-01-01 RX ADMIN — Medication: at 01:44

## 2021-01-01 RX ADMIN — Medication: at 06:10

## 2021-01-01 RX ADMIN — HEPARIN SODIUM 5000 UNITS: 5000 INJECTION INTRAVENOUS; SUBCUTANEOUS at 22:25

## 2021-01-01 RX ADMIN — PANTOPRAZOLE SODIUM 40 MG: 40 INJECTION, POWDER, FOR SOLUTION INTRAVENOUS at 08:38

## 2021-01-01 RX ADMIN — CARBOXYMETHYLCELLULOSE SODIUM 1 DROP: 10 GEL OPHTHALMIC at 06:06

## 2021-01-01 RX ADMIN — CARBOXYMETHYLCELLULOSE SODIUM 1 DROP: 10 GEL OPHTHALMIC at 01:27

## 2021-01-01 RX ADMIN — CALCIUM GLUCONATE 1000 MG: 20 INJECTION, SOLUTION INTRAVENOUS at 20:13

## 2021-01-01 RX ADMIN — Medication 1500 MG: at 21:10

## 2021-01-01 RX ADMIN — METRONIDAZOLE 500 MG: 500 INJECTION, SOLUTION INTRAVENOUS at 04:39

## 2021-01-01 RX ADMIN — CARBOXYMETHYLCELLULOSE SODIUM 1 DROP: 10 GEL OPHTHALMIC at 11:39

## 2021-01-01 RX ADMIN — ACETAMINOPHEN 650 MG: 325 TABLET ORAL at 15:16

## 2021-01-01 RX ADMIN — CEFEPIME HYDROCHLORIDE 1000 MG: 1 INJECTION, POWDER, FOR SOLUTION INTRAMUSCULAR; INTRAVENOUS at 20:48

## 2021-01-01 RX ADMIN — HEPARIN SODIUM: 1000 INJECTION INTRAVENOUS; SUBCUTANEOUS at 22:26

## 2021-01-01 RX ADMIN — Medication 15 ML: at 20:52

## 2021-01-01 RX ADMIN — Medication: at 05:44

## 2021-01-01 RX ADMIN — FENTANYL CITRATE 150 MCG/HR: 50 INJECTION, SOLUTION INTRAMUSCULAR; INTRAVENOUS at 01:06

## 2021-01-01 RX ADMIN — METRONIDAZOLE 500 MG: 500 INJECTION, SOLUTION INTRAVENOUS at 12:41

## 2021-01-01 RX ADMIN — CARBOXYMETHYLCELLULOSE SODIUM 1 DROP: 10 GEL OPHTHALMIC at 22:30

## 2021-01-01 RX ADMIN — HEPARIN SODIUM 5000 UNITS: 5000 INJECTION INTRAVENOUS; SUBCUTANEOUS at 22:31

## 2021-01-01 RX ADMIN — HEPARIN SODIUM 5000 UNITS: 5000 INJECTION INTRAVENOUS; SUBCUTANEOUS at 20:55

## 2021-01-01 RX ADMIN — CEFEPIME HYDROCHLORIDE 1000 MG: 1 INJECTION, POWDER, FOR SOLUTION INTRAMUSCULAR; INTRAVENOUS at 05:33

## 2021-01-01 RX ADMIN — CARBOXYMETHYLCELLULOSE SODIUM 1 DROP: 10 GEL OPHTHALMIC at 12:30

## 2021-01-01 RX ADMIN — LORAZEPAM 0.5 MG: 2 INJECTION INTRAMUSCULAR; INTRAVENOUS at 16:04

## 2021-01-01 RX ADMIN — CARBOXYMETHYLCELLULOSE SODIUM 1 DROP: 10 GEL OPHTHALMIC at 08:47

## 2021-01-01 RX ADMIN — HEPARIN SODIUM 5000 UNITS: 5000 INJECTION INTRAVENOUS; SUBCUTANEOUS at 06:53

## 2021-01-01 RX ADMIN — SODIUM BICARBONATE: 84 INJECTION, SOLUTION INTRAVENOUS at 12:10

## 2021-01-01 RX ADMIN — METRONIDAZOLE 500 MG: 500 INJECTION, SOLUTION INTRAVENOUS at 04:19

## 2021-01-01 RX ADMIN — HEPARIN SODIUM 5000 UNITS: 5000 INJECTION INTRAVENOUS; SUBCUTANEOUS at 05:32

## 2021-01-01 RX ADMIN — HEPARIN SODIUM 5000 UNITS: 5000 INJECTION INTRAVENOUS; SUBCUTANEOUS at 13:43

## 2021-01-01 RX ADMIN — HEPARIN SODIUM 5000 UNITS: 5000 INJECTION INTRAVENOUS; SUBCUTANEOUS at 21:53

## 2021-01-01 RX ADMIN — CARBOXYMETHYLCELLULOSE SODIUM 1 DROP: 10 GEL OPHTHALMIC at 16:14

## 2021-01-01 RX ADMIN — SODIUM CHLORIDE 1000 ML: 9 INJECTION, SOLUTION INTRAVENOUS at 19:07

## 2021-01-01 RX ADMIN — DEXTROSE MONOHYDRATE 12.5 G: 25 INJECTION, SOLUTION INTRAVENOUS at 11:11

## 2021-01-01 RX ADMIN — HEPARIN SODIUM: 1000 INJECTION INTRAVENOUS; SUBCUTANEOUS at 14:00

## 2021-01-01 RX ADMIN — FENTANYL CITRATE 150 MCG/HR: 50 INJECTION, SOLUTION INTRAMUSCULAR; INTRAVENOUS at 23:30

## 2021-01-01 RX ADMIN — HEPARIN SODIUM 5000 UNITS: 5000 INJECTION INTRAVENOUS; SUBCUTANEOUS at 06:11

## 2021-01-01 RX ADMIN — NOREPINEPHRINE BITARTRATE 2.03 MCG/MIN: 1 INJECTION, SOLUTION, CONCENTRATE INTRAVENOUS at 18:59

## 2021-01-01 RX ADMIN — VANCOMYCIN HYDROCHLORIDE 500 MG: 500 INJECTION, POWDER, LYOPHILIZED, FOR SOLUTION INTRAVENOUS at 14:09

## 2021-01-01 RX ADMIN — Medication 10 ML: at 08:52

## 2021-01-01 RX ADMIN — METRONIDAZOLE 500 MG: 500 INJECTION, SOLUTION INTRAVENOUS at 20:51

## 2021-01-01 RX ADMIN — MIDAZOLAM 5 MG/HR: 5 INJECTION INTRAMUSCULAR; INTRAVENOUS at 23:29

## 2021-01-01 RX ADMIN — Medication 15 ML: at 23:41

## 2021-01-01 RX ADMIN — HEPARIN SODIUM 5000 UNITS: 5000 INJECTION INTRAVENOUS; SUBCUTANEOUS at 06:29

## 2021-01-01 RX ADMIN — Medication: at 14:53

## 2021-01-01 RX ADMIN — MEROPENEM 1000 MG: 1 INJECTION, POWDER, FOR SOLUTION INTRAVENOUS at 11:38

## 2021-01-01 RX ADMIN — METRONIDAZOLE 500 MG: 500 INJECTION, SOLUTION INTRAVENOUS at 04:36

## 2021-01-01 RX ADMIN — SODIUM CHLORIDE 1000 ML: 9 INJECTION, SOLUTION INTRAVENOUS at 13:52

## 2021-01-01 RX ADMIN — CARBOXYMETHYLCELLULOSE SODIUM 1 DROP: 10 GEL OPHTHALMIC at 20:39

## 2021-01-01 RX ADMIN — CARBOXYMETHYLCELLULOSE SODIUM 1 DROP: 10 GEL OPHTHALMIC at 12:06

## 2021-01-01 RX ADMIN — Medication: at 00:10

## 2021-01-01 RX ADMIN — MUPIROCIN: 20 OINTMENT TOPICAL at 09:49

## 2021-01-01 RX ADMIN — PIPERACILLIN AND TAZOBACTAM 3375 MG: 3; .375 INJECTION, POWDER, LYOPHILIZED, FOR SOLUTION INTRAVENOUS at 19:04

## 2021-01-01 RX ADMIN — CEFEPIME HYDROCHLORIDE 2000 MG: 2 INJECTION, POWDER, FOR SOLUTION INTRAVENOUS at 19:44

## 2021-01-01 RX ADMIN — HEPARIN SODIUM 5000 UNITS: 5000 INJECTION INTRAVENOUS; SUBCUTANEOUS at 06:08

## 2021-01-01 RX ADMIN — ACETAMINOPHEN 650 MG: 325 TABLET ORAL at 03:25

## 2021-01-01 RX ADMIN — Medication: at 23:58

## 2021-01-01 RX ADMIN — HEPARIN SODIUM: 1000 INJECTION INTRAVENOUS; SUBCUTANEOUS at 17:53

## 2021-01-01 RX ADMIN — SODIUM BICARBONATE: 84 INJECTION, SOLUTION INTRAVENOUS at 23:37

## 2021-01-01 RX ADMIN — CARBOXYMETHYLCELLULOSE SODIUM 1 DROP: 10 GEL OPHTHALMIC at 04:35

## 2021-01-01 RX ADMIN — CEFEPIME HYDROCHLORIDE 1000 MG: 1 INJECTION, POWDER, FOR SOLUTION INTRAMUSCULAR; INTRAVENOUS at 06:42

## 2021-01-01 RX ADMIN — Medication: at 17:52

## 2021-01-01 RX ADMIN — MORPHINE SULFATE 2 MG: 2 INJECTION, SOLUTION INTRAMUSCULAR; INTRAVENOUS at 16:04

## 2021-01-01 RX ADMIN — HEPARIN SODIUM: 1000 INJECTION INTRAVENOUS; SUBCUTANEOUS at 04:30

## 2021-01-01 RX ADMIN — Medication: at 06:47

## 2021-01-01 RX ADMIN — PANTOPRAZOLE SODIUM 40 MG: 40 INJECTION, POWDER, FOR SOLUTION INTRAVENOUS at 19:39

## 2021-01-01 RX ADMIN — MEROPENEM 1000 MG: 1 INJECTION, POWDER, FOR SOLUTION INTRAVENOUS at 11:17

## 2021-01-01 RX ADMIN — VASOPRESSIN 0.03 UNITS/MIN: 20 INJECTION INTRAVENOUS at 12:06

## 2021-01-01 RX ADMIN — Medication 10 ML: at 20:57

## 2021-01-01 RX ADMIN — PANTOPRAZOLE SODIUM 40 MG: 40 INJECTION, POWDER, FOR SOLUTION INTRAVENOUS at 09:49

## 2021-01-01 RX ADMIN — HEPARIN SODIUM: 1000 INJECTION INTRAVENOUS; SUBCUTANEOUS at 03:44

## 2021-01-01 RX ADMIN — Medication 10 ML: at 08:47

## 2021-01-01 RX ADMIN — HEPARIN SODIUM 5000 UNITS: 5000 INJECTION INTRAVENOUS; SUBCUTANEOUS at 17:33

## 2021-01-01 RX ADMIN — CARBOXYMETHYLCELLULOSE SODIUM 1 DROP: 10 GEL OPHTHALMIC at 01:10

## 2021-01-01 RX ADMIN — DEXTROSE MONOHYDRATE 12.5 G: 25 INJECTION, SOLUTION INTRAVENOUS at 04:58

## 2021-01-01 RX ADMIN — SODIUM BICARBONATE: 84 INJECTION, SOLUTION INTRAVENOUS at 02:40

## 2021-01-01 RX ADMIN — MAGNESIUM SULFATE HEPTAHYDRATE 1000 MG: 1 INJECTION, SOLUTION INTRAVENOUS at 21:18

## 2021-01-01 RX ADMIN — MIDAZOLAM 5 MG/HR: 5 INJECTION INTRAMUSCULAR; INTRAVENOUS at 22:26

## 2021-01-01 RX ADMIN — Medication 15 ML: at 20:47

## 2021-01-01 RX ADMIN — PANTOPRAZOLE SODIUM 40 MG: 40 INJECTION, POWDER, FOR SOLUTION INTRAVENOUS at 08:51

## 2021-01-01 RX ADMIN — DEXTROSE MONOHYDRATE 12.5 G: 25 INJECTION, SOLUTION INTRAVENOUS at 07:57

## 2021-01-01 RX ADMIN — CARBOXYMETHYLCELLULOSE SODIUM 1 DROP: 10 GEL OPHTHALMIC at 20:51

## 2021-01-01 RX ADMIN — Medication: at 15:27

## 2021-01-01 RX ADMIN — Medication 15 ML: at 08:47

## 2021-01-01 RX ADMIN — NOREPINEPHRINE BITARTRATE 26 MCG/MIN: 1 INJECTION, SOLUTION, CONCENTRATE INTRAVENOUS at 12:09

## 2021-01-01 RX ADMIN — FENTANYL CITRATE 150 MCG/HR: 50 INJECTION, SOLUTION INTRAMUSCULAR; INTRAVENOUS at 16:08

## 2021-01-01 RX ADMIN — MAGNESIUM SULFATE HEPTAHYDRATE 1000 MG: 1 INJECTION, SOLUTION INTRAVENOUS at 22:39

## 2021-01-01 RX ADMIN — DEXTROSE MONOHYDRATE 12.5 G: 25 INJECTION, SOLUTION INTRAVENOUS at 18:39

## 2021-01-01 RX ADMIN — Medication: at 15:29

## 2021-01-01 RX ADMIN — FENTANYL CITRATE 175 MCG/HR: 50 INJECTION, SOLUTION INTRAMUSCULAR; INTRAVENOUS at 18:40

## 2021-01-01 RX ADMIN — SODIUM BICARBONATE: 84 INJECTION, SOLUTION INTRAVENOUS at 14:43

## 2021-01-01 RX ADMIN — CARBOXYMETHYLCELLULOSE SODIUM 1 DROP: 10 GEL OPHTHALMIC at 23:59

## 2021-01-01 RX ADMIN — SODIUM CHLORIDE 1000 ML: 9 INJECTION, SOLUTION INTRAVENOUS at 14:45

## 2021-01-01 RX ADMIN — CEFEPIME HYDROCHLORIDE 1000 MG: 1 INJECTION, POWDER, FOR SOLUTION INTRAMUSCULAR; INTRAVENOUS at 06:33

## 2021-01-01 RX ADMIN — FENTANYL CITRATE 200 MCG/HR: 50 INJECTION, SOLUTION INTRAMUSCULAR; INTRAVENOUS at 07:28

## 2021-01-01 RX ADMIN — Medication: at 09:26

## 2021-01-01 RX ADMIN — CARBOXYMETHYLCELLULOSE SODIUM 1 DROP: 10 GEL OPHTHALMIC at 08:38

## 2021-01-01 RX ADMIN — Medication 10 ML: at 07:57

## 2021-01-01 RX ADMIN — CARBOXYMETHYLCELLULOSE SODIUM 1 DROP: 10 GEL OPHTHALMIC at 08:51

## 2021-01-01 RX ADMIN — FENTANYL CITRATE 150 MCG/HR: 50 INJECTION, SOLUTION INTRAMUSCULAR; INTRAVENOUS at 06:48

## 2021-01-01 RX ADMIN — FENTANYL CITRATE 200 MCG/HR: 50 INJECTION, SOLUTION INTRAMUSCULAR; INTRAVENOUS at 20:33

## 2021-01-01 RX ADMIN — VANCOMYCIN HYDROCHLORIDE 500 MG: 500 INJECTION, POWDER, LYOPHILIZED, FOR SOLUTION INTRAVENOUS at 23:41

## 2021-01-01 RX ADMIN — METRONIDAZOLE 500 MG: 500 INJECTION, SOLUTION INTRAVENOUS at 12:04

## 2021-01-01 RX ADMIN — CARBOXYMETHYLCELLULOSE SODIUM 1 DROP: 10 GEL OPHTHALMIC at 17:33

## 2021-01-01 RX ADMIN — SODIUM BICARBONATE: 84 INJECTION, SOLUTION INTRAVENOUS at 15:13

## 2021-01-01 RX ADMIN — Medication 10 ML: at 22:30

## 2021-01-01 RX ADMIN — VASOPRESSIN 0.03 UNITS/MIN: 20 INJECTION INTRAVENOUS at 23:05

## 2021-01-01 RX ADMIN — METHYLPREDNISOLONE SODIUM SUCCINATE 125 MG: 125 INJECTION, POWDER, FOR SOLUTION INTRAMUSCULAR; INTRAVENOUS at 15:34

## 2021-01-01 RX ADMIN — MEROPENEM 1000 MG: 1 INJECTION, POWDER, FOR SOLUTION INTRAVENOUS at 12:16

## 2021-01-01 RX ADMIN — SODIUM CHLORIDE 3129 ML: 9 INJECTION, SOLUTION INTRAVENOUS at 15:23

## 2021-01-01 RX ADMIN — NOREPINEPHRINE BITARTRATE 10 MCG/MIN: 1 INJECTION, SOLUTION, CONCENTRATE INTRAVENOUS at 15:00

## 2021-01-01 RX ADMIN — NOREPINEPHRINE BITARTRATE 24 MCG/MIN: 1 INJECTION, SOLUTION, CONCENTRATE INTRAVENOUS at 02:18

## 2021-01-01 RX ADMIN — Medication 10 ML: at 20:40

## 2021-01-01 RX ADMIN — VASOPRESSIN 0.03 UNITS/MIN: 20 INJECTION INTRAVENOUS at 12:08

## 2021-01-01 RX ADMIN — MIDAZOLAM 2 MG/HR: 5 INJECTION INTRAMUSCULAR; INTRAVENOUS at 17:35

## 2021-01-01 RX ADMIN — HEPARIN SODIUM: 1000 INJECTION INTRAVENOUS; SUBCUTANEOUS at 07:27

## 2021-01-01 RX ADMIN — CALCIUM GLUCONATE 1000 MG: 20 INJECTION, SOLUTION INTRAVENOUS at 15:29

## 2021-01-01 RX ADMIN — NOREPINEPHRINE BITARTRATE 22 MCG/MIN: 1 INJECTION, SOLUTION, CONCENTRATE INTRAVENOUS at 04:37

## 2021-01-01 RX ADMIN — HEPARIN SODIUM 5000 UNITS: 5000 INJECTION INTRAVENOUS; SUBCUTANEOUS at 23:19

## 2021-01-01 RX ADMIN — CALCIUM GLUCONATE 1000 MG: 20 INJECTION, SOLUTION INTRAVENOUS at 23:43

## 2021-01-01 RX ADMIN — Medication: at 15:17

## 2021-01-01 RX ADMIN — CALCIUM GLUCONATE 1000 MG: 20 INJECTION, SOLUTION INTRAVENOUS at 03:12

## 2021-01-01 RX ADMIN — Medication: at 10:44

## 2021-01-01 RX ADMIN — CALCIUM GLUCONATE 1000 MG: 20 INJECTION, SOLUTION INTRAVENOUS at 06:28

## 2021-01-01 RX ADMIN — METRONIDAZOLE 500 MG: 500 INJECTION, SOLUTION INTRAVENOUS at 23:30

## 2021-01-01 RX ADMIN — MIDAZOLAM 7 MG/HR: 5 INJECTION INTRAMUSCULAR; INTRAVENOUS at 07:29

## 2021-01-01 RX ADMIN — MIDAZOLAM 2 MG: 1 INJECTION INTRAMUSCULAR; INTRAVENOUS at 14:45

## 2021-01-01 RX ADMIN — CALCIUM GLUCONATE 1000 MG: 20 INJECTION, SOLUTION INTRAVENOUS at 01:20

## 2021-01-01 RX ADMIN — ASPIRIN 81 MG: 81 TABLET, COATED ORAL at 08:49

## 2021-01-01 RX ADMIN — VASOPRESSIN 0.03 UNITS/MIN: 20 INJECTION INTRAVENOUS at 14:22

## 2021-01-01 RX ADMIN — Medication 15 ML: at 08:38

## 2021-01-01 RX ADMIN — FENTANYL CITRATE 200 MCG/HR: 50 INJECTION, SOLUTION INTRAMUSCULAR; INTRAVENOUS at 01:40

## 2021-01-01 RX ADMIN — FENTANYL CITRATE 75 MCG/HR: 50 INJECTION, SOLUTION INTRAMUSCULAR; INTRAVENOUS at 15:45

## 2021-01-01 RX ADMIN — METRONIDAZOLE 500 MG: 500 INJECTION, SOLUTION INTRAVENOUS at 17:33

## 2021-01-01 RX ADMIN — DEXTROSE MONOHYDRATE 12.5 G: 25 INJECTION, SOLUTION INTRAVENOUS at 11:12

## 2021-01-01 RX ADMIN — FENTANYL CITRATE 150 MCG/HR: 50 INJECTION, SOLUTION INTRAMUSCULAR; INTRAVENOUS at 14:52

## 2021-01-01 RX ADMIN — HEPARIN SODIUM 5000 UNITS: 5000 INJECTION INTRAVENOUS; SUBCUTANEOUS at 14:31

## 2021-01-01 RX ADMIN — NOREPINEPHRINE BITARTRATE 26 MCG/MIN: 1 INJECTION, SOLUTION, CONCENTRATE INTRAVENOUS at 01:23

## 2021-01-01 RX ADMIN — ACETAMINOPHEN 650 MG: 325 TABLET ORAL at 08:49

## 2021-01-01 RX ADMIN — SODIUM CHLORIDE 1000 ML: 9 INJECTION, SOLUTION INTRAVENOUS at 13:43

## 2021-01-01 RX ADMIN — Medication 15 ML: at 08:51

## 2021-01-01 RX ADMIN — MORPHINE SULFATE 2 MG: 2 INJECTION, SOLUTION INTRAMUSCULAR; INTRAVENOUS at 06:12

## 2021-01-01 RX ADMIN — MEROPENEM 1000 MG: 1 INJECTION, POWDER, FOR SOLUTION INTRAVENOUS at 23:07

## 2021-01-01 RX ADMIN — CARBOXYMETHYLCELLULOSE SODIUM 1 DROP: 10 GEL OPHTHALMIC at 05:43

## 2021-01-01 RX ADMIN — PANTOPRAZOLE SODIUM 40 MG: 40 INJECTION, POWDER, FOR SOLUTION INTRAVENOUS at 09:34

## 2021-01-01 RX ADMIN — MIDAZOLAM HYDROCHLORIDE 0.5 MG: 5 INJECTION, SOLUTION INTRAMUSCULAR; INTRAVENOUS at 16:08

## 2021-01-01 RX ADMIN — HEPARIN SODIUM: 1000 INJECTION INTRAVENOUS; SUBCUTANEOUS at 13:38

## 2021-01-01 RX ADMIN — HEPARIN SODIUM 5000 UNITS: 5000 INJECTION INTRAVENOUS; SUBCUTANEOUS at 06:41

## 2021-01-01 RX ADMIN — METRONIDAZOLE 500 MG: 500 INJECTION, SOLUTION INTRAVENOUS at 20:03

## 2021-01-01 RX ADMIN — CEFEPIME HYDROCHLORIDE 1000 MG: 1 INJECTION, POWDER, FOR SOLUTION INTRAMUSCULAR; INTRAVENOUS at 06:26

## 2021-01-01 ASSESSMENT — PAIN SCALES - WONG BAKER
WONGBAKER_NUMERICALRESPONSE: 4
WONGBAKER_NUMERICALRESPONSE: 4
WONGBAKER_NUMERICALRESPONSE: 0
WONGBAKER_NUMERICALRESPONSE: 4

## 2021-01-01 ASSESSMENT — ENCOUNTER SYMPTOMS
NAUSEA: 1
COUGH: 0
VOICE CHANGE: 0
STRIDOR: 0
SINUS PRESSURE: 0
VOMITING: 1
DIARRHEA: 1
WHEEZING: 0
BLOOD IN STOOL: 0
ABDOMINAL DISTENTION: 0
EYE REDNESS: 0
EYE ITCHING: 0
FACIAL SWELLING: 0
SHORTNESS OF BREATH: 1
PHOTOPHOBIA: 0
TROUBLE SWALLOWING: 0
CONSTIPATION: 0
BACK PAIN: 0
TACHYPNEA: 1
ANAL BLEEDING: 0
SORE THROAT: 0
CHEST TIGHTNESS: 0
EYE PAIN: 0
RHINORRHEA: 0
EYE DISCHARGE: 0
ABDOMINAL PAIN: 0

## 2021-01-01 ASSESSMENT — PULMONARY FUNCTION TESTS
PIF_VALUE: 29
PIF_VALUE: 21
PIF_VALUE: 29
PIF_VALUE: 28
PIF_VALUE: 24
PIF_VALUE: 27
PIF_VALUE: 29
PIF_VALUE: 30
PIF_VALUE: 32
PIF_VALUE: 23
PIF_VALUE: 28
PIF_VALUE: 30
PIF_VALUE: 20
PIF_VALUE: 26
PIF_VALUE: 29
PIF_VALUE: 31
PIF_VALUE: 26
PIF_VALUE: 27
PIF_VALUE: 42
PIF_VALUE: 22
PIF_VALUE: 22
PIF_VALUE: 29
PIF_VALUE: 23
PIF_VALUE: 28
PIF_VALUE: 27
PIF_VALUE: 29
PIF_VALUE: 27
PIF_VALUE: 26
PIF_VALUE: 32
PIF_VALUE: 28
PIF_VALUE: 28
PIF_VALUE: 26
PIF_VALUE: 29
PIF_VALUE: 28
PIF_VALUE: 30
PIF_VALUE: 28
PIF_VALUE: 27
PIF_VALUE: 25
PIF_VALUE: 29
PIF_VALUE: 29
PIF_VALUE: 31
PIF_VALUE: 35
PIF_VALUE: 29
PIF_VALUE: 27
PIF_VALUE: 28
PIF_VALUE: 35
PIF_VALUE: 29
PIF_VALUE: 37
PIF_VALUE: 27
PIF_VALUE: 29
PIF_VALUE: 22
PIF_VALUE: 30
PIF_VALUE: 24
PIF_VALUE: 29
PIF_VALUE: 27
PIF_VALUE: 32
PIF_VALUE: 28
PIF_VALUE: 28
PIF_VALUE: 27
PIF_VALUE: 27
PIF_VALUE: 26
PIF_VALUE: 29
PIF_VALUE: 28
PIF_VALUE: 27
PIF_VALUE: 22
PIF_VALUE: 24
PIF_VALUE: 26
PIF_VALUE: 24
PIF_VALUE: 27
PIF_VALUE: 27
PIF_VALUE: 23
PIF_VALUE: 22
PIF_VALUE: 28
PIF_VALUE: 33
PIF_VALUE: 30
PIF_VALUE: 24
PIF_VALUE: 37
PIF_VALUE: 28
PIF_VALUE: 31
PIF_VALUE: 30
PIF_VALUE: 28

## 2021-01-01 ASSESSMENT — PAIN SCALES - GENERAL
PAINLEVEL_OUTOF10: 9
PAINLEVEL_OUTOF10: 6
PAINLEVEL_OUTOF10: 0
PAINLEVEL_OUTOF10: 9
PAINLEVEL_OUTOF10: 0
PAINLEVEL_OUTOF10: 8
PAINLEVEL_OUTOF10: 0
PAINLEVEL_OUTOF10: 4
PAINLEVEL_OUTOF10: 0
PAINLEVEL_OUTOF10: 4
PAINLEVEL_OUTOF10: 0
PAINLEVEL_OUTOF10: 3
PAINLEVEL_OUTOF10: 4
PAINLEVEL_OUTOF10: 0
PAINLEVEL_OUTOF10: 5
PAINLEVEL_OUTOF10: 0

## 2021-01-01 ASSESSMENT — PAIN DESCRIPTION - PAIN TYPE
TYPE: ACUTE PAIN

## 2021-01-01 ASSESSMENT — PAIN DESCRIPTION - ORIENTATION
ORIENTATION: RIGHT;UPPER

## 2021-01-01 ASSESSMENT — PAIN DESCRIPTION - LOCATION
LOCATION: ABDOMEN

## 2021-01-01 ASSESSMENT — PAIN DESCRIPTION - DESCRIPTORS
DESCRIPTORS: ACHING
DESCRIPTORS: SHARP
DESCRIPTORS: SHARP

## 2021-12-04 PROBLEM — R65.21 SHOCK, SEPTIC (HCC): Status: ACTIVE | Noted: 2021-01-01

## 2021-12-04 PROBLEM — A41.9 SHOCK, SEPTIC (HCC): Status: ACTIVE | Noted: 2021-01-01

## 2021-12-04 NOTE — ED PROVIDER NOTES
I interviewed and examined this patient with Dr. Roxana Villegas, resident. Please see their note for details of H&P. [place] is a 78year old male who has been ill for about a week. He has a history of COPD and is on oxygen 4 liters by nasal canula. The patient states that a few days before Thanksgiving he had some shortness of breath and a fever. He took some Tylenol and his fever eventually resolved. Today he is weak, nauseated, and has had some dark brown colored emesis. He is short of breath and is requiring his oxygen 24/7. He denies any pain currently but is still nauseated. BP (!) 70/46   Pulse 63   Temp 97.7 °F (36.5 °C) (Oral)   Resp 16   Ht 6' 2\" (1.88 m)   Wt 230 lb (104.3 kg)   SpO2 99%   BMI 29.53 kg/m²     I have reviewed the following from the nursing documentation:      Prior to Admission medications    Medication Sig Start Date End Date Taking? Authorizing Provider   allopurinol (ZYLOPRIM) 100 MG tablet Take 200 mg by mouth daily    Historical Provider, MD   atorvastatin (LIPITOR) 40 MG tablet Take 40 mg by mouth nightly    Historical Provider, MD   buPROPion (WELLBUTRIN XL) 150 MG extended release tablet Take 150 mg by mouth every morning    Historical Provider, MD   hydrOXYzine (VISTARIL) 25 MG capsule Take 25 mg by mouth 3 times daily as needed for Anxiety    Historical Provider, MD   lisinopril (PRINIVIL;ZESTRIL) 5 MG tablet Take 5 mg by mouth daily    Historical Provider, MD   omeprazole (PRILOSEC) 20 MG delayed release capsule Take 20 mg by mouth daily    Historical Provider, MD   sertraline (ZOLOFT) 100 MG tablet Take 100 mg by mouth daily    Historical Provider, MD   aspirin 81 MG tablet Take 81 mg by mouth daily    Historical Provider, MD   senna (SENOKOT) 8.6 MG tablet Take 1 tablet by mouth 2 times daily    Historical Provider, MD   pregabalin (LYRICA) 100 MG capsule Take 100 mg by mouth 2 times daily.     Historical Provider, MD   oxyCODONE (OXYCONTIN) 20 MG extended release tablet Take 20 mg by mouth every 12 hours. Historical Provider, MD   albuterol sulfate  (90 Base) MCG/ACT inhaler Inhale 2 puffs into the lungs every 6 hours as needed for Wheezing    Historical Provider, MD   metoprolol tartrate (LOPRESSOR) 50 MG tablet Take 25 mg by mouth 2 times daily     Historical Provider, MD   traZODone (DESYREL) 100 MG tablet Take 100 mg by mouth nightly    Historical Provider, MD       Allergies as of 12/04/2021 - Fully Reviewed 12/04/2021   Allergen Reaction Noted    Dye [iodides]  03/19/2019       Past Medical History:   Diagnosis Date    Arthritis     [facility]     [facility]     neuroendocrine tumor; pancreatic cancer    COPD [facility]chronic obstructive [facility] disease) [facility]     Depression     Gout     Hyperlipidemia     Hypertension     PTSD (post-traumatic stress disorder)         Surgical History:   Past Surgical History:   Procedure Laterality Date    BACK SURGERY      lumbar laminectomy    CARPAL TUNNEL RELEASE Bilateral     JOINT REPLACEMENT      left knee        Family History:  No family history on file. Social History     Socioeconomic History    Marital status:      Spouse name: Not on file    Number of children: Not on file    Years of education: Not on file    Highest education level: Not on file   Occupational History    Not on file   Tobacco Use    Smoking status: Former Smoker    Smokeless tobacco: Never Used   Vaping Use    Vaping Use: Never used   Substance and Sexual Activity    Alcohol use:  Yes     Alcohol/week: 6.0 standard drinks     Types: 6 Glasses of wine per week    Drug use: Never    Sexual activity: Not on file   Other Topics Concern    Not on file   Social History Narrative    Not on file     Social Determinants of Health     Financial Resource Strain:     Difficulty of Paying Living Expenses: Not on file   Food Insecurity:     Worried About [address] in the Last Year: Not on file    Ran Out of Food in the Last Year: Not on file   Transportation Needs:     Lack of Transportation (Medical): Not on file    Lack of Transportation (Non-Medical): Not on file   Physical Activity:     Days of Exercise per Week: Not on file    Minutes of Exercise per Session: Not on file   Stress:     Feeling of Stress : Not on file   Social Connections:     Frequency of Communication with Friends and Family: Not on file    Frequency of Social Gatherings with Friends and Family: Not on file    Attends Catholic Services: Not on file    Active Member of [address] [place] [address] [employer] or Organizations: Not on file    Attends Club or Organization Meetings: Not on file    Marital Status: Not on file   Intimate Partner Violence:     Fear of Current or Ex-Partner: Not on file    Emotionally Abused: Not on file    Physically Abused: Not on file    Sexually Abused: Not on file   Housing Stability:     Unable to Pay for Housing in the Last Year: Not on file    Number of [place] in the Last Year: Not on file    Unstable Housing in the Last Year: Not on file         Review of Systems   Constitutional: Negative for activity change, appetite change, chills, diaphoresis, fatigue and fever. HENT: Negative. Negative for congestion, dental problem, ear pain, facial swelling, rhinorrhea, sinus pressure, sneezing, sore throat, tinnitus, trouble swallowing and voice change. Eyes: Negative for photophobia, pain, discharge, redness, itching and visual disturbance. Respiratory: Positive for shortness of breath. Negative for cough, chest tightness, wheezing and stridor. Cardiovascular: Negative for chest pain, palpitations and leg swelling. Gastrointestinal: Positive for diarrhea, nausea and vomiting. Negative for abdominal distention, abdominal pain, anal bleeding, blood in stool and constipation.    Genitourinary: Negative for difficulty urinating, dysuria, frequency, hematuria, penile discharge, testicular pain and urgency. Musculoskeletal: Negative for back pain, joint swelling, neck pain and neck stiffness. Skin: Negative for rash and wound. Neurological: Positive for weakness. Negative for dizziness, syncope, facial asymmetry, speech difficulty, numbness and headaches. Hematological: Does not bruise/bleed easily. Psychiatric/Behavioral: Negative for agitation, confusion, hallucinations, self-injury, sleep disturbance and suicidal ideas. The patient is not nervous/anxious. All other systems reviewed and are negative. Physical Exam  Constitutional:       Appearance: He is well-developed. He is ill-appearing. HENT:      Head: Normocephalic and atraumatic. Mouth/Throat:      Pharynx: No pharyngeal swelling or oropharyngeal exudate. Comments: Dry mucous membranes; dark dried material in his mouth and on his neck and chest.   Eyes:      Extraocular Movements: Extraocular movements intact. Pupils: Pupils are equal, round, and reactive to light. Cardiovascular:      Rate and Rhythm: Normal rate and regular rhythm. Pulmonary:      Effort: Pulmonary effort is normal.      Breath sounds: Normal breath sounds. Chest:      Chest wall: No tenderness. Musculoskeletal:         General: Normal range of motion. Cervical back: Normal range of motion. Skin:     General: Skin is warm and dry. Capillary Refill: Capillary refill takes less than 2 seconds. Coloration: Skin is pale. Neurological:      General: No focal deficit present. Mental Status: He is alert. GCS: GCS eye subscore is 4. GCS verbal subscore is 5. GCS motor subscore is 6. Cranial Nerves: Cranial nerves are intact. Sensory: Sensation is intact. Motor: Weakness: generalized. Coordination: Coordination is intact. Deep Tendon Reflexes:      Reflex Scores:       Bicep reflexes are 1+ on the right side and 1+ on the left side.        Patellar reflexes are 1+ on the right side and 1+ on the left side. Comments: Coordination, speech, balance and cognition are intact. There is no nuchal rigidity or evidence of meningismus. Negative Kernig's and Brudzinski's signs. All sensory and motor components of the brachial/lumbosacral plexus tested are symmetric and intact. No focal deficits appreciated.       Psychiatric:         Mood and Affect: Mood normal.         Behavior: Behavior normal.          Procedures     MDM           Labs  Results for orders placed or performed during the hospital encounter of 12/04/21   CBC auto differential   Result Value Ref Range    WBC 21.2 (H) 4.0 - 11.0 K/uL    RBC 3.48 (L) 4.20 - 5.90 M/uL    Hemoglobin 13.0 (L) 13.5 - 17.5 g/dL    Hematocrit 38.6 (L) 40.5 - 52.5 %    .0 (H) 80.0 - 100.0 fL    MCH 37.5 (H) 26.0 - 34.0 pg    MCHC 33.7 31.0 - 36.0 g/dL    RDW 14.9 12.4 - 15.4 %    Platelets 920 962 - 585 K/uL    MPV 11.9 (H) 5.0 - 10.5 fL    SLIDE REVIEW see below     Path Consult Yes     Neutrophils % 93.7 %    Lymphocytes % 2.6 %    Monocytes % 3.4 %    Eosinophils % 0.0 %    Basophils % 0.3 %    Neutrophils Absolute 19.8 (H) 1.7 - 7.7 K/uL    Lymphocytes Absolute 0.6 (L) 1.0 - 5.1 K/uL    Monocytes Absolute 0.7 0.0 - 1.3 K/uL    Eosinophils Absolute 0.0 0.0 - 0.6 K/uL    Basophils Absolute 0.1 0.0 - 0.2 K/uL    Macrocytes 2+ (A)    Comprehensive metabolic panel   Result Value Ref Range    Sodium 135 (L) 136 - 145 mmol/L    Potassium 3.9 3.5 - 5.1 mmol/L    Chloride 98 (L) 99 - 110 mmol/L    CO2 21 21 - 32 mmol/L    Anion Gap 16 3 - 16    Glucose 116 (H) 70 - 99 mg/dL    BUN 91 (HH) 7 - 20 mg/dL    CREATININE 3.6 (H) 0.8 - 1.3 mg/dL    GFR Non-African American 17 (A) >60    GFR  20 (A) >60    Calcium 8.9 8.3 - 10.6 mg/dL    Total Protein 7.3 6.4 - 8.2 g/dL    Albumin 2.3 (L) 3.4 - 5.0 g/dL    Albumin/Globulin Ratio 0.5 (L) 1.1 - 2.2    Total Bilirubin 2.3 (H) 0.0 - 1.0 mg/dL    Alkaline Phosphatase 311 (H) 40 - 129 U/L    ALT 26 10 - 40 U/L    AST 27 15 - 37 U/L   Troponin   Result Value Ref Range    Troponin 0.01 <0.01 ng/mL   Lipase   Result Value Ref Range    Lipase 30.0 13.0 - 60.0 U/L   Lactic Acid, Plasma   Result Value Ref Range    Lactic Acid 1.3 0.4 - 2.0 mmol/L   EKG 12 Lead   Result Value Ref Range    Ventricular Rate 65 BPM    Atrial Rate 65 BPM    P-R Interval 208 ms    QRS Duration 108 ms    Q-T Interval 468 ms    QTc Calculation (Bazett) 486 ms    P Axis 61 degrees    R Axis 9 degrees    T Axis 19 degrees    Diagnosis       Normal sinus rhythmProlonged QTAbnormal ECGWhen compared with ECG of 20-MAR-2019 08:51,QT has lengthened         Radiology  XR CHEST PORTABLE    Result Date: 12/4/2021  Mild right basilar atelectasis. EKG Interpretation. The Ekg interpreted by me in the absence of a cardiologist shows. normal sinus rhythm with a rate of 65 with first degree AV block present  Axis is   Normal  QTc is  486ms  Intervals and Durations are unremarkable. No specific ST-T wave changes appreciated. No evidence of acute ischemia. No significant change from prior EKG dated 20 March 2019. 3:35 PM: I discussed the history, physical, and treatment plan with Dr. Dianne Franco. Blas Morrow was signed out in stable condition. Please see Dr. Trina Cortes note for further details, including diagnosis and disposition.      Gabby Downey MD  12/04/21 [id number]

## 2021-12-04 NOTE — H&P
Hospital Medicine History & Physical      PCP: Dionne Carrillo MD    Date of Admission: 12/4/2021    Date of Service: Pt seen/examined on 12/4/2021 and Admitted to Inpatient with expected LOS greater than two midnights due to medical therapy. .    Chief Complaint: Generalized weakness      History Of Present Illness:      78 y.o. male who presented to [facility] with above complaint. He looks ill and unable to get detailed history. He he has nausea and vomiting for last 2 days he denies chest pain abdominal pain diarrhea urinary complaints cough sputum or headache. According to ER physician :  Livan Prajapati is a 78 y.o. male w/ hx of COPD on 4L NC as needed, HTN, HLD, afib, and neuroendocrine pancreatic cancer who presents with shortness of breath and fatigue. Reports a few days before Thanksgiving he began feeling tired, feverish, and short of breath. He took some Tylenol which did help with the fever. Since Thanksgiving he has continued to feel weak, rundown, nauseous, SOB, had loss of appetite and diarrhea. He feels the weakness is worse in his legs. He denies any vomiting but there is evidence of dark brown emesis in oral cavity, on chest, and on the front of his shirt. He  complaint right upper quadrant pain in the ER    Currently he is on Levophed and told that he feels little better.     Past Medical History:          Diagnosis Date    Arthritis     Atrial fibrillation ([address]     Cancer ([address]     neuroendocrine tumor; pancreatic cancer    COPD (chronic obstructive pulmonary disease) ([address]     Depression     Gout     Hyperlipidemia     Hypertension     PTSD (post-traumatic stress disorder)        Past Surgical History:          Procedure Laterality Date    BACK SURGERY      lumbar laminectomy    CARPAL TUNNEL RELEASE Bilateral     JOINT REPLACEMENT      left knee       Medications Prior to Admission:      Prior to Admission medications    Medication Sig Start Date End Date Taking? Authorizing Provider   allopurinol (ZYLOPRIM) 100 MG tablet Take 200 mg by mouth daily    Historical Provider, MD   atorvastatin (LIPITOR) 40 MG tablet Take 40 mg by mouth nightly    Historical Provider, MD   buPROPion (WELLBUTRIN XL) 150 MG extended release tablet Take 150 mg by mouth every morning    Historical Provider, MD   hydrOXYzine (VISTARIL) 25 MG capsule Take 25 mg by mouth 3 times daily as needed for Anxiety    Historical Provider, MD   lisinopril (PRINIVIL;ZESTRIL) 5 MG tablet Take 5 mg by mouth daily    Historical Provider, MD   omeprazole (PRILOSEC) 20 MG delayed release capsule Take 20 mg by mouth daily    Historical Provider, MD   sertraline (ZOLOFT) 100 MG tablet Take 100 mg by mouth daily    Historical Provider, MD   aspirin 81 MG tablet Take 81 mg by mouth daily    Historical Provider, MD   senna (SENOKOT) 8.6 MG tablet Take 1 tablet by mouth 2 times daily    Historical Provider, MD   pregabalin (LYRICA) 100 MG capsule Take 100 mg by mouth 2 times daily. Historical Provider, MD   oxyCODONE (OXYCONTIN) 20 MG extended release tablet Take 20 mg by mouth every 12 hours. Historical Provider, MD   albuterol sulfate  (90 Base) MCG/ACT inhaler Inhale 2 puffs into the lungs every 6 hours as needed for Wheezing    Historical Provider, MD   metoprolol tartrate (LOPRESSOR) 50 MG tablet Take 25 mg by mouth 2 times daily     Historical Provider, MD   traZODone (DESYREL) 100 MG tablet Take 100 mg by mouth nightly    Historical Provider, MD       Allergies:  Dye [iodides]    Social History:      The patient currently lives at home    TOBACCO:   reports that he has quit smoking. He has never used smokeless tobacco.  ETOH:   reports current alcohol use of about 6.0 standard drinks of alcohol per week.   E-Cigarettes/Vaping Use     Questions Responses    E-Cigarette/Vaping Use Never User    Start Date     Passive Exposure     Quit Date     Counseling Given     Comments             Family History: Reviewed in detail and negative for DM, CAD, Cancer, CVA. Positive as follows:    No family history on file. REVIEW OF SYSTEMS COMPLETED:   Pertinent positives as noted in the HPI. All other systems reviewed and negative. PHYSICAL EXAM PERFORMED:    BP (!) 89/57   Pulse 60   Temp 97.7 °F (36.5 °C) (Oral)   Resp 14   Ht 6' 2\" (1.88 m)   Wt 230 lb (104.3 kg)   SpO2 100%   BMI 29.53 kg/m²     General appearance:  No apparent distress, appears stated age and cooperative. Looks tired and ill  HEENT:  Normal cephalic, atraumatic without obvious deformity. Extra ocular muscles intact. Conjunctivae/corneas clear. Neck: Supple, with full range of motion. No jugular venous distention. Trachea midline. Respiratory:  Normal respiratory effort. Reduced air entry, bilaterally without Rales/Wheezes/Rhonchi. Cardiovascular:  Regular rate and rhythm with normal S1/S2 without murmurs, rubs or gallops. Abdomen: Soft, non-tender, non-distended with normal bowel sounds. Obese  Musculoskeletal:  No clubbing, cyanosis or edema bilaterally. Full range of motion without deformity. Skin: Skin color, texture, turgor normal.  No rashes or lesions. Neurologic:  Neurovascularly intact without any focal sensory/motor deficits. Psychiatric:  Alert and oriented,  Capillary Refill: Brisk,3 seconds, normal  Peripheral Pulses: +2 palpable, equal bilaterally       Labs:     Recent Labs     12/04/21  1350   WBC 21.2*   HGB 13.0*   HCT 38.6*        Recent Labs     12/04/21  1350   *   K 3.9   CL 98*   CO2 21   BUN 91*   CREATININE 3.6*   CALCIUM 8.9     Recent Labs     12/04/21  1350   AST 27   ALT 26   BILITOT 2.3*   ALKPHOS 311*     No results for input(s): INR in the last 72 hours.   Recent Labs     12/04/21  1350   TROPONINI 0.01       Urinalysis:      Lab Results   Component Value Date    NITRU Negative 12/04/2021    WBCUA 3-5 12/04/2021    BACTERIA Rare 12/04/2021    RBCUA 3-4 12/04/2021    BLOODU Negative 12/04/2021    SPECGRAV >=1.030 12/04/2021    GLUCOSEU Negative 12/04/2021       Radiology:     CXR: I have reviewed the CXR with the following interpretation:   EKG:  I have reviewed the EKG with the following interpretation: Normal sinus rhythm 65/min no acute ischemic changes    XR CHEST PORTABLE   Final Result   Right IJ central venous catheter with catheter tip cavoatrial junction. Bibasilar atelectasis/pneumonitis. Pulmonary vascular congestion. US GALLBLADDER RUQ   Final Result   Cholelithiasis with a significant amount of echogenic material in the   gallbladder suggesting biliary sludge. The gallbladder is dilated. 6 cm cyst inferiorly in the right kidney         CT ABDOMEN PELVIS WO CONTRAST Additional Contrast? None   Preliminary Result   1. Cholelithiasis with suggestion of gallbladder wall thickening. If patient   has right upper quadrant abdominal pain, ultrasound would better assess   gallbladder. 2. Right lower lobe and left lobe airspace consolidations in the lungs. Please correlate with clinical symptoms of pneumonia. 3. Nodular liver contour and splenomegaly. Please correlate with clinical   history of cirrhosis and portal hypertension. 4. Small volume of ascites in the pelvis and along the mesentery. 5. Sigmoid diverticulosis. No evidence of diverticulitis. 6. Nonobstructing left nephrolithiasis. 7. Bilateral exophytic renal cortical masses, including suspected   hemorrhagic/proteinaceous cyst  arising from the left lower renal pole. Additional hypodensities are not adequately assessed on this noncontrast exam   and would better be assessed on a nonemergent basis with MR per renal mass   protocol. XR CHEST PORTABLE   Final Result   Mild right basilar atelectasis.              ASSESSMENT:    Active Hospital Problems    Diagnosis Date Noted    Shock, septic ([address] [A41.9, R65.21] 12/04/2021         PLAN:    Shock possibly septic-cause is not clear-cholecystitis, pneumonia cannot be ruled out, UA is normal-admit to ICU, continue Levophed, IV fluids, septic work-up broad-spectrum antibiotics, intensivist evaluation  Ultrasound of gallbladder-nonconclusive  Given normal transaminitis-cholecystitis less likely/?  Surgeon general surgery was consulted from ER  HIDA scan defer to surgeon    REJI-possibly secondary to poor p.o. intake as well as shock-IV fluids, intake and output, nephrology evaluation, repeat BMP  Avoid antihypertensives as well as nephrotoxic medications    Anion gap acidosis-secondary to above    Nausea and vomiting-possibly secondary to sepsis cause not clear, n.p.o., IV Protonix, antiemetics    Chronic hypoxic respiratory failure secondary to COPD-on 4 L oxygen no change    Macrocytosis-TSH A22 folic acid pending    Radiology: Evidence of possible cirrhosis with splenomegaly portal hypertension and small ascites-GI eval    Renal mass-May consider further work-up once she is stable or as an outpatient    Morbid obesity-need counseling    Atrial fibrillation-metoprolol on hold not on anticoagulation, verify currently sinus            DVT Prophylaxis: Heparin  Diet: N.p.o.  Code Status: Full code    PT/OT Eval Status:     Dispo -admitted to ICU  Spent 42 minutes of critical care time       Corky Richter MD    Thank you Omari Lizarraga MD for the opportunity to be involved in this patient's care. If you have any questions or concerns please feel free to contact me at [phone].

## 2021-12-04 NOTE — ED NOTES
1640 - PS to Dr Jazmyn Vigil at general surgery  Re: cholecystitis.  septic shock  1712 - Dr Jazmyn Vigil called back to speak with Dr Claude Ayala  12/04/21 [id number]

## 2021-12-04 NOTE — ED PROVIDER NOTES
Procedure Note - Central Line:  The benefits, risks, and alternatives of central venous access were discussed with Dr. Naveen Bach patient is noted to be hypotensive despite 3 L peripheral crystalloid administration and as such will likely need a vasopressor and Verbal consent was obtained for the procedure. Salud Hamilton was prepped and draped in standard bedside fashion in the right IJ area. Physical landmarks with ultrasound guidance was used to locate the central vein. Local anesthesia with 2ml of 2% lidocaine was injected. Seldinger technique was used for placement of the CVC in a non-pulsatile vasculature. All ports indigo and flushed. The patient tolerated the procedure well and no acute complications occurred.     Proper placement was verified with chest x-ray       PRATIMA Mccracken  12/04/21 Trinity Herrera

## 2021-12-04 NOTE — ED PROVIDER NOTES
Patient was signed out to me from Dr. Jared walsh at approximately 2 PM.  At time of signout remaining of labs and CT imaging are pending. Brief HPI: Patient presents to the emergency department complaining shortness of breath and right upper quadrant pain. Patient has COPD and wears 4 L of oxygen at home as needed. Patient has been wearing 4 L of oxygen around-the-clock over the last couple of days. Additionally, he reports nausea. Abdominal pain is rated as moderate to severe. Physical exam: General: Moderate distress. Heart: Regular rate rhythm peer normal S1-S2. Lungs: Clear to auscultation. No wheeze, rales, rhonchi. Abdomen: Soft right upper quadrant tenderness to palpation. Voluntary guarding. No rebound tenderness. Normal bowel sounds. LABS  I have reviewed all labs for this visit.    Results for orders placed or performed during the hospital encounter of 12/04/21   CBC auto differential   Result Value Ref Range    WBC 21.2 (H) 4.0 - 11.0 K/uL    RBC 3.48 (L) 4.20 - 5.90 M/uL    Hemoglobin 13.0 (L) 13.5 - 17.5 g/dL    Hematocrit 38.6 (L) 40.5 - 52.5 %    .0 (H) 80.0 - 100.0 fL    MCH 37.5 (H) 26.0 - 34.0 pg    MCHC 33.7 31.0 - 36.0 g/dL    RDW 14.9 12.4 - 15.4 %    Platelets 445 013 - 773 K/uL    MPV 11.9 (H) 5.0 - 10.5 fL    SLIDE REVIEW see below     Path Consult Yes     Neutrophils % 93.7 %    Lymphocytes % 2.6 %    Monocytes % 3.4 %    Eosinophils % 0.0 %    Basophils % 0.3 %    Neutrophils Absolute 19.8 (H) 1.7 - 7.7 K/uL    Lymphocytes Absolute 0.6 (L) 1.0 - 5.1 K/uL    Monocytes Absolute 0.7 0.0 - 1.3 K/uL    Eosinophils Absolute 0.0 0.0 - 0.6 K/uL    Basophils Absolute 0.1 0.0 - 0.2 K/uL    Macrocytes 2+ (A)    Comprehensive metabolic panel   Result Value Ref Range    Sodium 135 (L) 136 - 145 mmol/L    Potassium 3.9 3.5 - 5.1 mmol/L    Chloride 98 (L) 99 - 110 mmol/L    CO2 21 21 - 32 mmol/L    Anion Gap 16 3 - 16    Glucose 116 (H) 70 - 99 mg/dL BUN 91 (HH) 7 - 20 mg/dL    CREATININE 3.6 (H) 0.8 - 1.3 mg/dL    GFR Non-African American 17 (A) >60    GFR  20 (A) >60    Calcium 8.9 8.3 - 10.6 mg/dL    Total Protein 7.3 6.4 - 8.2 g/dL    Albumin 2.3 (L) 3.4 - 5.0 g/dL    Albumin/Globulin Ratio 0.5 (L) 1.1 - 2.2    Total Bilirubin 2.3 (H) 0.0 - 1.0 mg/dL    Alkaline Phosphatase 311 (H) 40 - 129 U/L    ALT 26 10 - 40 U/L    AST 27 15 - 37 U/L   Troponin   Result Value Ref Range    Troponin 0.01 <0.01 ng/mL   Lipase   Result Value Ref Range    Lipase 30.0 13.0 - 60.0 U/L   Urinalysis, reflex to microscopic   Result Value Ref Range    Color, UA DARK YELLOW (A) Straw/Yellow    Clarity, UA Clear Clear    Glucose, Ur Negative Negative mg/dL    Bilirubin Urine MODERATE (A) Negative    Ketones, Urine TRACE (A) Negative mg/dL    Specific Gravity, UA >=1.030 1.005 - 1.030    Blood, Urine Negative Negative    pH, UA 5.0 5.0 - 8.0    Protein, UA TRACE (A) Negative mg/dL    Urobilinogen, Urine 2.0 (A) <2.0 E.U./dL    Nitrite, Urine Negative Negative    Leukocyte Esterase, Urine Negative Negative    Microscopic Examination YES     Urine Type NotGiven    Lactic Acid, Plasma   Result Value Ref Range    Lactic Acid 1.3 0.4 - 2.0 mmol/L   Blood Occult Stool Screen #1   Result Value Ref Range    Occult Blood Screening Result: Negative  Normal range: Negative      Microscopic Urinalysis   Result Value Ref Range    WBC, UA 3-5 0 - 5 /HPF    RBC, UA 3-4 0 - 4 /HPF    Epithelial Cells, UA 0-1 0 - 5 /HPF    Bacteria, UA Rare (A) None Seen /HPF    Amorphous, UA 2+ /HPF   EKG 12 Lead   Result Value Ref Range    Ventricular Rate 65 BPM    Atrial Rate 65 BPM    P-R Interval 208 ms    QRS Duration 108 ms    Q-T Interval 468 ms    QTc Calculation (Bazett) 486 ms    P Axis 61 degrees    R Axis 9 degrees    T Axis 19 degrees    Diagnosis       Normal sinus rhythmProlonged QTAbnormal ECGWhen compared with ECG of 20-MAR-2019 08:51,QT has lengthened           RADIOLOGY  X-RAYS:  I have reviewed radiologic plain film image(s). ALL OTHER NON-PLAIN FILM IMAGES SUCH AS CT, ULTRASOUND AND MRI HAVE BEEN READ BY THE RADIOLOGIST. US GALLBLADDER RUQ   Final Result   Cholelithiasis with a significant amount of echogenic material in the   gallbladder suggesting biliary sludge. The gallbladder is dilated. 6 cm cyst inferiorly in the right kidney         CT ABDOMEN PELVIS WO CONTRAST Additional Contrast? None   Preliminary Result   1. Cholelithiasis with suggestion of gallbladder wall thickening. If patient   has right upper quadrant abdominal pain, ultrasound would better assess   gallbladder. 2. Right lower lobe and left lobe airspace consolidations in the lungs. Please correlate with clinical symptoms of pneumonia. 3. Nodular liver contour and splenomegaly. Please correlate with clinical   history of cirrhosis and portal hypertension. 4. Small volume of ascites in the pelvis and along the mesentery. 5. Sigmoid diverticulosis. No evidence of diverticulitis. 6. Nonobstructing left nephrolithiasis. 7. Bilateral exophytic renal cortical masses, including suspected   hemorrhagic/proteinaceous cyst  arising from the left lower renal pole. Additional hypodensities are not adequately assessed on this noncontrast exam   and would better be assessed on a nonemergent basis with MR per renal mass   protocol. XR CHEST PORTABLE   Final Result   Mild right basilar atelectasis. XR CHEST PORTABLE    (Results Pending)              Rechecks: Physical assessment performed. 1446: Blood pressure 74/46    1509: Blood pressure 75/49. Saline infusing. 1650: Patient remains hypotensive central line and pressor support ordered. Central line placed by ASAD GREGORY [facility], [facility].      Critical Care: Critical care 35 minutes was completed on patient in addition to and excluding any procedures noted secondary to concerns for septic shock/cholecystitis      ED COURSE/MDM  Patient seen and evaluated. Old records reviewed. Labs and imaging reviewed and results discussed with patient. Patient was given pain medicine, nausea medication, saline infusion and broad-spectrum antibiotics in the ED with good symptomatic relief. General surgery and hospitalist were notified and patient will be admitted for further evaluation and treatment. . Plan of care discussed with patient and family. Patient and family in agreement with plan. Patient was given scripts for the following medications. I counseled patient how to take these medications. New Prescriptions    No medications on file       CLINICAL IMPRESSION  1. Dyspnea, unspecified type    2. Hypoxia    3. REJI (acute kidney injury) ([facility] [address] [facility]    4. Leukocytosis, unspecified type    5. Hyperbilirubinemia    6. COPD exacerbation ([facility] [facility]    7. Septic shock ([facility] [facility]    8. Cholecystitis    9. Pneumonia of right lung due to infectious organism, unspecified part of lung        Blood pressure (!) 89/53, pulse 81, temperature 97.7 °F (36.5 °C), temperature source Oral, resp. rate 16, height 6' 2\" (1.88 m), weight 230 lb (104.3 kg), SpO2 94 %.     DISPOSITION  Josefina Briceno was admitted in guarded condition          Shunsuraj Santizo, DO  12/04/21 Maxime Muse

## 2021-12-04 NOTE — ED PROVIDER NOTES
use: Yes     Alcohol/week: 6.0 standard drinks     Types: 6 Glasses of wine per week    Drug use: Never    Sexual activity: Not on file   Other Topics Concern    Not on file   Social History Narrative    Not on file     Social Determinants of Health     Financial Resource Strain:     Difficulty of Paying Living Expenses: Not on file   Food Insecurity:     Worried About Running Out of Food in the Last Year: Not on file    Lulu of Food in the Last Year: Not on file   Transportation Needs:     Lack of Transportation (Medical): Not on file    Lack of Transportation (Non-Medical): Not on file   Physical Activity:     Days of Exercise per Week: Not on file    Minutes of Exercise per Session: Not on file   Stress:     Feeling of Stress : Not on file   Social Connections:     Frequency of Communication with Friends and Family: Not on file    Frequency of Social Gatherings with Friends and Family: Not on file    Attends Gnosticist Services: Not on file    Active Member of [address] [place] [address] or Organizations: Not on file    Attends Club or Organization Meetings: Not on file    Marital Status: Not on file   Intimate Partner Violence:     Fear of Current or Ex-Partner: Not on file    Emotionally Abused: Not on file    Physically Abused: Not on file    Sexually Abused: Not on file   Housing Stability:     Unable to Pay for Housing in the Last Year: Not on file    Number of [place] in the Last Year: Not on file    Unstable Housing in the Last Year: Not on file       No family history on file. Allergies:  Dye [iodides]    Home Medications:  Prior to Admission medications    Medication Sig Start Date End Date Taking?  Authorizing Provider   allopurinol (ZYLOPRIM) 100 MG tablet Take 200 mg by mouth daily    Historical Provider, MD   atorvastatin (LIPITOR) 40 MG tablet Take 40 mg by mouth nightly    Historical Provider, MD   buPROPion (WELLBUTRIN XL) 150 MG extended release tablet Take 150 mg by mouth every morning    Historical Provider, MD   hydrOXYzine (VISTARIL) 25 MG capsule Take 25 mg by mouth 3 times daily as needed for Anxiety    Historical Provider, MD   lisinopril (PRINIVIL;ZESTRIL) 5 MG tablet Take 5 mg by mouth daily    Historical Provider, MD   omeprazole (PRILOSEC) 20 MG delayed release capsule Take 20 mg by mouth daily    Historical Provider, MD   sertraline (ZOLOFT) 100 MG tablet Take 100 mg by mouth daily    Historical Provider, MD   aspirin 81 MG tablet Take 81 mg by mouth daily    Historical Provider, MD   senna (SENOKOT) 8.6 MG tablet Take 1 tablet by mouth 2 times daily    Historical Provider, MD   pregabalin (LYRICA) 100 MG capsule Take 100 mg by mouth 2 times daily. Historical Provider, MD   oxyCODONE (OXYCONTIN) 20 MG extended release tablet Take 20 mg by mouth every 12 hours. Historical Provider, MD   albuterol sulfate  (90 Base) MCG/ACT inhaler Inhale 2 puffs into the lungs every 6 hours as needed for Wheezing    Historical Provider, MD   metoprolol tartrate (LOPRESSOR) 50 MG tablet Take 25 mg by mouth 2 times daily     Historical Provider, MD   traZODone (DESYREL) 100 MG tablet Take 100 mg by mouth nightly    Historical Provider, MD       REVIEW OF SYSTEMS    (2-9 systems for level 4, 10 or more for level 5)      Review of Systems   Constitutional: Positive for fatigue. Negative for chills, diaphoresis and fever. HENT: Negative for congestion and rhinorrhea. Respiratory: Positive for shortness of breath. Negative for cough and chest tightness. Cardiovascular: Negative for chest pain. Gastrointestinal: Positive for diarrhea and nausea. Negative for abdominal pain and constipation. Genitourinary: Negative for difficulty urinating. Musculoskeletal: Negative for arthralgias and myalgias. Skin: Negative for rash. Neurological: Positive for weakness. Negative for dizziness, light-headedness, numbness and headaches.    Psychiatric/Behavioral: Negative for confusion and decreased concentration. PHYSICAL EXAM   (up to 7 for level 4, 8 or more for level 5)      INITIAL VITALS:   BP (!) 81/51   Pulse 86   Temp 97.7 °F (36.5 °C) (Oral)   Resp 16   Ht 6' 2\" (1.88 m)   Wt 230 lb (104.3 kg)   SpO2 100%   BMI 29.53 kg/m²     Physical Exam  Constitutional:       Appearance: He is well-developed. He is ill-appearing. HENT:      Head: Normocephalic and atraumatic. Mouth/Throat:      Mouth: Mucous membranes are dry. Comments: Dried black substance coating tongue  Eyes:      Extraocular Movements: Extraocular movements intact. Pupils: Pupils are equal, round, and reactive to light. Neck:      Comments: Dry, black debris on neck and chest  Cardiovascular:      Rate and Rhythm: Normal rate and regular rhythm. Pulses: Normal pulses. Heart sounds: Normal heart sounds. Pulmonary:      Effort: Pulmonary effort is normal. No accessory muscle usage or respiratory distress. Breath sounds: Normal breath sounds. Abdominal:      Palpations: Abdomen is soft. Tenderness: There is abdominal tenderness (RLQ). There is no guarding or rebound. Musculoskeletal:      Cervical back: Neck supple. Right lower leg: No edema. Left lower leg: No edema. Lymphadenopathy:      Cervical: No cervical adenopathy. Skin:     General: Skin is dry. Capillary Refill: Capillary refill takes less than 2 seconds. Coloration: Skin is pale. Nails: There is clubbing. Comments: Cold extremities   Neurological:      General: No focal deficit present. Mental Status: He is alert and oriented to person, place, and time. Cranial Nerves: No cranial nerve deficit. Motor: Weakness (generalized) present. Deep Tendon Reflexes:      Reflex Scores:       Bicep reflexes are 1+ on the right side and 1+ on the left side. Patellar reflexes are 1+ on the right side and 1+ on the left side. Comments: In extremis.    Psychiatric: Mood and Affect: Mood normal.         Behavior: Behavior normal.         DIFFERENTIAL  DIAGNOSIS     PLAN (LABS / IMAGING / EKG):  Orders Placed This Encounter   Procedures    XR CHEST PORTABLE    CT ABDOMEN PELVIS WO CONTRAST Additional Contrast? None    CBC auto differential    Comprehensive metabolic panel    Troponin    Lipase    Urinalysis, reflex to microscopic    Lactic Acid, Plasma    Blood Occult Stool Screen #1    EKG 12 Lead    Saline lock IV       MEDICATIONS ORDERED:  Orders Placed This Encounter   Medications    0.9 % sodium chloride bolus    0.9 % sodium chloride infusion    0.9 % sodium chloride bolus    methylPREDNISolone sodium (SOLU-MEDROL) injection 125 mg       DIAGNOSTIC RESULTS / EMERGENCY DEPARTMENT COURSE / MDM   :  Results for orders placed or performed during the hospital encounter of 12/04/21   CBC auto differential   Result Value Ref Range    WBC 21.2 (H) 4.0 - 11.0 K/uL    RBC 3.48 (L) 4.20 - 5.90 M/uL    Hemoglobin 13.0 (L) 13.5 - 17.5 g/dL    Hematocrit 38.6 (L) 40.5 - 52.5 %    .0 (H) 80.0 - 100.0 fL    MCH 37.5 (H) 26.0 - 34.0 pg    MCHC 33.7 31.0 - 36.0 g/dL    RDW 14.9 12.4 - 15.4 %    Platelets 188 634 - 493 K/uL    MPV 11.9 (H) 5.0 - 10.5 fL    SLIDE REVIEW see below     Path Consult Yes     Neutrophils % 93.7 %    Lymphocytes % 2.6 %    Monocytes % 3.4 %    Eosinophils % 0.0 %    Basophils % 0.3 %    Neutrophils Absolute 19.8 (H) 1.7 - 7.7 K/uL    Lymphocytes Absolute 0.6 (L) 1.0 - 5.1 K/uL    Monocytes Absolute 0.7 0.0 - 1.3 K/uL    Eosinophils Absolute 0.0 0.0 - 0.6 K/uL    Basophils Absolute 0.1 0.0 - 0.2 K/uL    Macrocytes 2+ (A)    Comprehensive metabolic panel   Result Value Ref Range    Sodium 135 (L) 136 - 145 mmol/L    Potassium 3.9 3.5 - 5.1 mmol/L    Chloride 98 (L) 99 - 110 mmol/L    CO2 21 21 - 32 mmol/L    Anion Gap 16 3 - 16    Glucose 116 (H) 70 - 99 mg/dL    BUN 91 (HH) 7 - 20 mg/dL    CREATININE 3.6 (H) 0.8 - 1.3 mg/dL    GFR Non-African American 17 (A) >60    GFR  20 (A) >60    Calcium 8.9 8.3 - 10.6 mg/dL    Total Protein 7.3 6.4 - 8.2 g/dL    Albumin 2.3 (L) 3.4 - 5.0 g/dL    Albumin/Globulin Ratio 0.5 (L) 1.1 - 2.2    Total Bilirubin 2.3 (H) 0.0 - 1.0 mg/dL    Alkaline Phosphatase 311 (H) 40 - 129 U/L    ALT 26 10 - 40 U/L    AST 27 15 - 37 U/L   Troponin   Result Value Ref Range    Troponin 0.01 <0.01 ng/mL   Lipase   Result Value Ref Range    Lipase 30.0 13.0 - 60.0 U/L   Lactic Acid, Plasma   Result Value Ref Range    Lactic Acid 1.3 0.4 - 2.0 mmol/L   EKG 12 Lead   Result Value Ref Range    Ventricular Rate 65 BPM    Atrial Rate 65 BPM    P-R Interval 208 ms    QRS Duration 108 ms    Q-T Interval 468 ms    QTc Calculation (Bazett) 486 ms    P Axis 61 degrees    R Axis 9 degrees    T Axis 19 degrees    Diagnosis       Normal sinus rhythmProlonged QTAbnormal ECGWhen compared with ECG of 20-MAR-2019 08:51,QT has lengthened       IMPRESSION:     RADIOLOGY:  CXR showed mild right basilar atelectasis. CT abdomen and pelvis w/o contrast     EKG  All EKG's are interpreted by the Emergency Department Physician who either signs or Co-signs this chart in the absence of a cardiologist.    EMERGENCY DEPARTMENT COURSE:  This is a 24-year-old male who presents with shortness of breath, nausea, and weakness. Upon arrival, he is ill-appearing, pale, and hypotensive. On exam he he has dry mucous membranes with dried black spots on his tongue, neck, and chest.  Right lower quadrant was tender to palpation, he has generalized weakness, and diminished reflexes. Patient was started on fluids for hypotension. CT abdomen was ordered, pending. CXR showed mild right basilar atelectasis and so with hx of COPD patient was given Solu-Medrol. Troponin, lipase, and lactate were unremarkable. UA and occult stool collected, pending. WBC was 21.2, H/H 13.0/38.6, .0, BUN 91, creatinine 3.6, GFR 17 total bilirubin 2.3, and alk phos 311.0.  History, physical, and lab findings discussed with Dr. Gabrielle Taveras. Patient was signed out in stable condition. Please see Dr. Alleen Boas note for further information, including diagnosis and disposition. PROCEDURES:  None    CONSULTS:  None    CRITICAL CARE:  None    FINAL IMPRESSION      1. Dyspnea, unspecified type    2. Hypoxia    3. REJI (acute kidney injury) ([facility] [address]    4. Leukocytosis, unspecified type    5. Hyperbilirubinemia    6. [facility]          DISPOSITION / PLAN     DISPOSITION    Signed out to Dr. Gabrielle Taveras DO in stable condition. PATIENT REFERRED TO:  No follow-up provider specified. DISCHARGE MEDICATIONS:  New Prescriptions    No medications on file       Kathy Gates DO  Family Medicine Resident, PGY-1    (Please note that portions of this note were completed with a voice recognition program.  Efforts were made to edit the dictations but occasionally words are mis-transcribed. ).       Roxie Dozier DO  Resident  12/04/21 1818

## 2021-12-05 PROBLEM — N17.9 AKI (ACUTE KIDNEY INJURY) (HCC): Status: ACTIVE | Noted: 2021-01-01

## 2021-12-05 PROBLEM — E87.20 NORMAL ANION GAP METABOLIC ACIDOSIS: Status: ACTIVE | Noted: 2021-01-01

## 2021-12-05 PROBLEM — D72.829 LEUKOCYTOSIS: Status: ACTIVE | Noted: 2021-01-01

## 2021-12-05 PROBLEM — D75.89 MACROCYTOSIS: Status: ACTIVE | Noted: 2021-01-01

## 2021-12-05 PROBLEM — K80.00 CALCULUS OF GALLBLADDER WITH ACUTE CHOLECYSTITIS WITHOUT OBSTRUCTION: Status: ACTIVE | Noted: 2021-01-01

## 2021-12-05 PROBLEM — R91.8 PULMONARY INFILTRATES: Status: ACTIVE | Noted: 2021-01-01

## 2021-12-05 PROBLEM — R16.1 SPLENOMEGALY: Status: ACTIVE | Noted: 2021-01-01

## 2021-12-05 PROBLEM — R74.8 ELEVATED ALKALINE PHOSPHATASE LEVEL: Status: ACTIVE | Noted: 2021-01-01

## 2021-12-05 NOTE — PROGRESS NOTES
Dr. Knowles Cooper at bedside for AM rounds/new consult. Plan of care reviewed and updated. No new orders at this time.

## 2021-12-05 NOTE — CONSULTS
Pharmacy Note  Vancomycin Consult    Froylan Georges is a 78 y.o. male started on Vancomycin for Sepsis / CAP; consult received from Dr. Sinan Clifford to manage therapy. Also receiving the following antibiotics: Cefepime, Metronidazole, Pip/Tazo    Allergies:  Dye [iodides]     Tmax: 97.7 F    Recent Labs     12/04/21  1350   CREATININE 3.6*       Recent Labs     12/04/21  1350   WBC 21.2*       Estimated Creatinine Clearance: 23 mL/min (A) (based on SCr of 3.6 mg/dL (H)). No intake or output data in the 24 hours ending 12/04/21 1902    Wt Readings from Last 1 Encounters:   12/04/21 230 lb (104.3 kg)         Body mass index is 29.53 kg/m². Culture Date      Source                       Results  12/4                    blood                         ordered    Assessment/Plan:  Pulse dose due to renal insufficiency  Give Vancomycin 1500 mg x1 dose tonight at 2100.   12 hr Vancomycin level ordered for tomorrow at 0900. Timing of trough level will be determined based on culture results, renal function, and clinical response. Thank you for the consult. Luis Boston, PharmD 12/4/2021  7:05 PM     12/5 1000  Vanc rdm = 15.7 mcg/mL ~13hr post dose  Estimated Creatinine Clearance: 24 mL/min (A) (based on SCr of 3.6 mg/dL (H)).   Vancomycin 500 mg x1  Recheck level tomorrow AM  Kam Campuzano, PharmD  12/5/2021 at 12:05 PM

## 2021-12-05 NOTE — PROGRESS NOTES
Upon review of pt's chart, 12/4 sign and held orders were not released. Orders released at this time. Secure message made to Dr. Pauline Luna regarding the above, \"Pt admitted yesterday 12/4. The signed and held orders were never released overnight, I just released them this AM. a few orders from this; The second Blood culture was never drawn but pt has already received Zosyn, vanc, and cefepime. . do you still want this second blood culture, or can I d/c? second, there is a 3L LR bolus ordered , pt received 5L bolus total yesterday in ED. Would you like this? or also d/c? \"    Response received to DC blood culture orders and bolus.

## 2021-12-05 NOTE — RT PROTOCOL NOTE
RT Inhaler-Nebulizer Bronchodilator Protocol Note    There is a bronchodilator order in the chart from a provider indicating to follow the RT Bronchodilator Protocol and there is an Initiate RT Inhaler-Nebulizer Bronchodilator Protocol order as well (see protocol at bottom of note). CXR Findings:  XR CHEST PORTABLE    Result Date: 12/4/2021  Right IJ central venous catheter with catheter tip cavoatrial junction. Bibasilar atelectasis/pneumonitis. Pulmonary vascular congestion. XR CHEST PORTABLE    Result Date: 12/4/2021  Mild right basilar atelectasis. The findings from the last RT Protocol Assessment were as follows:   History Pulmonary Disease: Smoker 15 pack years or more  Respiratory Pattern: Dyspnea on exertion or RR 21-25 bpm  Breath Sounds: Slightly diminished and/or crackles  Cough: Strong, spontaneous, non-productive  Indication for Bronchodilator Therapy:    Bronchodilator Assessment Score: 5    Aerosolized bronchodilator medication orders have been revised according to the RT Inhaler-Nebulizer Bronchodilator Protocol below. Respiratory Therapist to perform RT Therapy Protocol Assessment initially then follow the protocol. Repeat RT Therapy Protocol Assessment PRN for score 0-3 or on second treatment, BID, and PRN for scores above 3. No Indications  adjust the frequency to every 6 hours PRN wheezing or bronchospasm, if no treatments needed after 48 hours then discontinue using Per Protocol order mode. If indication present, adjust the RT bronchodilator orders based on the Bronchodilator Assessment Score as indicated below. Use Inhaler orders unless patient has one or more of the following: on home nebulizer, not able to hold breath for 10 seconds, is not alert and oriented, cannot activate and use MDI correctly, or respiratory rate 25 breaths per minute or more, then use the equivalent nebulizer order(s) with same Frequency and PRN reasons based on the score.   If a patient is on this medication at home then do not decrease Frequency below that used at home. 0-3  enter or revise RT bronchodilator order(s) to equivalent RT Bronchodilator order with Frequency of every 4 hours PRN for wheezing or increased work of breathing using Per Protocol order mode. 4-6  enter or revise RT Bronchodilator order(s) to two equivalent RT bronchodilator orders with one order with BID Frequency and one order with Frequency of every 4 hours PRN wheezing or increased work of breathing using Per Protocol order mode. 7-10  enter or revise RT Bronchodilator order(s) to two equivalent RT bronchodilator orders with one order with TID Frequency and one order with Frequency of every 4 hours PRN wheezing or increased work of breathing using Per Protocol order mode. 11-13  enter or revise RT Bronchodilator order(s) to one equivalent RT bronchodilator order with QID Frequency and an Albuterol order with Frequency of every 4 hours PRN wheezing or increased work of breathing using Per Protocol order mode. Greater than 13  enter or revise RT Bronchodilator order(s) to one equivalent RT bronchodilator order with every 4 hours Frequency and an Albuterol order with Frequency of every 2 hours PRN wheezing or increased work of breathing using Per Protocol order mode. RT to enter RT Home Evaluation for COPD & MDI Assessment order using Per Protocol order mode.     Electronically signed by Frederick Atkins RCP on 12/4/2021 at 11:38 PM

## 2021-12-05 NOTE — PROGRESS NOTES
Hospitalist Progress Note      PCP: Cbua Alvarado MD    Date of Admission: 12/4/2021    Chief Complaint: Generalized weakness    Hospital Course:   Ned Fu a 78 y. o. male w/ hx of COPD on 4L NC as needed, HTN, HLD, afib, and neuroendocrine pancreatic cancer who presents with shortness of breath and fatigue.  Reports a few days before Thanksgiving he began feeling tired, feverish, and short of breath.  He took some Tylenol which did help with the fever.  Since Thanksgiving he has continued to feel weak, rundown, nauseous, SOB, had loss of appetite and diarrhea.  He feels the weakness is worse in his legs.  He denies any vomiting but there is evidence of dark brown emesis in oral cavity, on chest, and on the front of his shirt. He  complaint right upper quadrant pain in the ER      Subjective: Patient was seen in the ICU wants to drink water and eat denies any chest pain complains of shortness of breath.       Medications:  Reviewed    Infusion Medications    sodium chloride 100 mL/hr at 12/05/21 0720    norepinephrine 6 mcg/min (12/05/21 0835)    sodium chloride      norepinephrine Stopped (12/04/21 1900)     Scheduled Medications    influenza virus vaccine  0.5 mL IntraMUSCular Prior to discharge    aspirin  81 mg Oral Daily    atorvastatin  40 mg Oral Nightly    sodium chloride flush  5-40 mL IntraVENous 2 times per day    heparin (porcine)  5,000 Units SubCUTAneous 3 times per day    cefepime  1,000 mg IntraVENous Q12H    metroNIDAZOLE  500 mg IntraVENous Q8H    pantoprazole  40 mg IntraVENous Daily    vancomycin (VANCOCIN) intermittent dosing (placeholder)   Other RX Placeholder     PRN Meds: sodium chloride flush, sodium chloride, acetaminophen **OR** acetaminophen, ipratropium-albuterol      Intake/Output Summary (Last 24 hours) at 12/5/2021 0952  Last data filed at 12/5/2021 0800  Gross per 24 hour   Intake 1675 ml   Output 655 ml   Net 1020 ml       Physical Exam Performed:    /72   Pulse 73   Temp 97.2 °F (36.2 °C) (Bladder) Comment: saurabh hugger off  Resp 20   Ht 6' 2\" (1.88 m)   Wt 258 lb 9.6 oz (117.3 kg)   SpO2 90%   BMI 33.20 kg/m²     General appearance: No apparent distress, appears stated age and cooperative. HEENT: Pupils equal, round, and reactive to light. Conjunctivae/corneas clear. Neck: Supple, with full range of motion. No jugular venous distention. Trachea midline. Respiratory:  Normal respiratory effort. Decreased breath sound. Cardiovascular: Regular rate and rhythm with normal S1/S2 without murmurs, rubs or gallops. Abdomen: Soft, non-tender, non-distended with normal bowel sounds. Musculoskeletal: No clubbing, cyanosis or edema bilaterally. Full range of motion without deformity. Skin: Skin color, texture, turgor normal.  No rashes or lesions. Neurologic:  Neurovascularly intact without any focal sensory/motor deficits. Cranial nerves: II-XII intact, grossly non-focal.  Psychiatric: Alert and oriented, thought content appropriate, normal insight  Capillary Refill: Brisk,3 seconds, normal   Peripheral Pulses: +2 palpable, equal bilaterally       Labs:   Recent Labs     12/04/21  1350 12/05/21  0857   WBC 21.2* 31.8*   HGB 13.0* 12.9*   HCT 38.6* 39.4*    269     Recent Labs     12/04/21  1350 12/05/21  0857   * 134*   K 3.9 4.1   CL 98* 102   CO2 21 18*   BUN 91* 97*   CREATININE 3.6* 3.6*   CALCIUM 8.9 7.9*   PHOS  --  5.9*     Recent Labs     12/04/21  1350   AST 27   ALT 26   BILITOT 2.3*   ALKPHOS 311*     No results for input(s): INR in the last 72 hours.   Recent Labs     12/04/21  1350   TROPONINI 0.01       Urinalysis:      Lab Results   Component Value Date    NITRU Negative 12/04/2021    WBCUA 3-5 12/04/2021    BACTERIA Rare 12/04/2021    RBCUA 3-4 12/04/2021    BLOODU Negative 12/04/2021    SPECGRAV >=1.030 12/04/2021    GLUCOSEU Negative 12/04/2021       Radiology:  XR CHEST PORTABLE   Final Result   Right IJ central venous catheter with catheter tip cavoatrial junction. Bibasilar atelectasis/pneumonitis. Pulmonary vascular congestion. US GALLBLADDER RUQ   Final Result   Cholelithiasis with a significant amount of echogenic material in the   gallbladder suggesting biliary sludge. The gallbladder is dilated. 6 cm cyst inferiorly in the right kidney         CT ABDOMEN PELVIS WO CONTRAST Additional Contrast? None   Preliminary Result   1. Cholelithiasis with suggestion of gallbladder wall thickening. If patient   has right upper quadrant abdominal pain, ultrasound would better assess   gallbladder. 2. Right lower lobe and left lobe airspace consolidations in the lungs. Please correlate with clinical symptoms of pneumonia. 3. Nodular liver contour and splenomegaly. Please correlate with clinical   history of cirrhosis and portal hypertension. 4. Small volume of ascites in the pelvis and along the mesentery. 5. Sigmoid diverticulosis. No evidence of diverticulitis. 6. Nonobstructing left nephrolithiasis. 7. Bilateral exophytic renal cortical masses, including suspected   hemorrhagic/proteinaceous cyst  arising from the left lower renal pole. Additional hypodensities are not adequately assessed on this noncontrast exam   and would better be assessed on a nonemergent basis with MR per renal mass   protocol. XR CHEST PORTABLE   Final Result   Mild right basilar atelectasis. Assessment/Plan:    Active Hospital Problems    Diagnosis     Shock, septic ([facility] [address] [A41.9, R65.21]      1. This 65-year-old male admitted to ICU with septic shock due to cholecystitis started on IV fluid, Levophed, continue with IV antibiotic leukocytosis improving Levophed being weaned, n.p.o. surgery consulted, if does not improve consider percutaneous cholecystostomy. 2.  Acute kidney injury continue with IV hydration check daily BMP, nephrology consulted and following.   3.  Hypertension currently antihypertensive medication on hold due to hypotension on admission Levophed being weaned off.  4.  Hyperlipidemia continue with home medication.   5.  History of a neuroendocrine pancreatic cancer      DVT Prophylaxis: Heparin subcu  Diet: Diet NPO  Code Status: Full Code    PT/OT Eval Status:     Anjana Menard MD

## 2021-12-05 NOTE — CONSULTS
[facility] Consult Note           Reason for Consult: REJI  Requesting Physician:  Dr. Talha Barraza    Chief Complaint:    Chief Complaint   Patient presents with    Shortness of Breath     for a couple days. . pt is copd wears 4 lts at home. . pt arrived with pressure 70/46       History of Present Illness on 12/5/21:    78 y.o. yo male with PMH of copd on home o2, HTN, HLD who is admitted for reji and septic shock  Presented with h/o right sided abd pain, n/v for the last several days; was taking motrin at home  He got fatigued and sob and presented to ED   SBP was in the 70s and cr was 3.6; received 3l bolus and has been on levophed which has been weaned off this am    Past Medical History:        Diagnosis Date    Arthritis     Atrial fibrillation ([address]     Cancer ([facility] [address]     neuroendocrine tumor; pancreatic cancer    COPD (chronic obstructive pulmonary disease) ([facility] [address]     Depression     Gout     Hyperlipidemia     Hypertension     PTSD (post-traumatic stress disorder)        Past Surgical History:        Procedure Laterality Date    BACK SURGERY      lumbar laminectomy    CARPAL TUNNEL RELEASE Bilateral     JOINT REPLACEMENT      left knee       Home Medications:    No current facility-administered medications on file prior to encounter.      Current Outpatient Medications on File Prior to Encounter   Medication Sig Dispense Refill    allopurinol (ZYLOPRIM) 100 MG tablet Take 200 mg by mouth daily      atorvastatin (LIPITOR) 40 MG tablet Take 40 mg by mouth nightly      buPROPion (WELLBUTRIN XL) 150 MG extended release tablet Take 150 mg by mouth every morning      hydrOXYzine (VISTARIL) 25 MG capsule Take 25 mg by mouth 3 times daily as needed for Anxiety      lisinopril (PRINIVIL;ZESTRIL) 5 MG tablet Take 5 mg by mouth daily      omeprazole (PRILOSEC) 20 MG delayed release capsule Take 20 mg by mouth daily      sertraline (ZOLOFT) 100 MG tablet Take 100 mg by mouth daily      aspirin 81 MG tablet Take 81 mg by mouth daily      senna (SENOKOT) 8.6 MG tablet Take 1 tablet by mouth 2 times daily      pregabalin (LYRICA) 100 MG capsule Take 100 mg by mouth 2 times daily.  oxyCODONE (OXYCONTIN) 20 MG extended release tablet Take 20 mg by mouth every 12 hours.  albuterol sulfate  (90 Base) MCG/ACT inhaler Inhale 2 puffs into the lungs every 6 hours as needed for Wheezing      metoprolol tartrate (LOPRESSOR) 50 MG tablet Take 25 mg by mouth 2 times daily       traZODone (DESYREL) 100 MG tablet Take 100 mg by mouth nightly         Allergies:  Dye [iodides]    Social History:    Social History     Socioeconomic History    Marital status:      Spouse name: Not on file    Number of children: Not on file    Years of education: Not on file    Highest education level: Not on file   Occupational History    Not on file   Tobacco Use    Smoking status: Former Smoker    Smokeless tobacco: Never Used   Vaping Use    Vaping Use: Never used   Substance and Sexual Activity    Alcohol use: Yes     Alcohol/week: 6.0 standard drinks     Types: 6 Glasses of wine per week    Drug use: Never    Sexual activity: Not on file   Other Topics Concern    Not on file   Social History Narrative    Not on file     Social Determinants of Health     Financial Resource Strain:     Difficulty of Paying Living Expenses: Not on file   Food Insecurity:     Worried About Running Out of Food in the Last Year: Not on file    Lulu of Food in the Last Year: Not on file   Transportation Needs:     Lack of Transportation (Medical): Not on file    Lack of Transportation (Non-Medical):  Not on file   Physical Activity:     Days of Exercise per Week: Not on file    Minutes of Exercise per Session: Not on file   Stress:     Feeling of Stress : Not on file   Social Connections:     Frequency of Communication with Friends and Family: Not on file    Frequency of Social Gatherings with Friends and Family: Not on file    Attends Rastafari Services: Not on file    Active Member of Clubs or Organizations: Not on file    Attends Club or Organization Meetings: Not on file    Marital Status: Not on file   Intimate Partner Violence:     Fear of Current or Ex-Partner: Not on file    Emotionally Abused: Not on file    Physically Abused: Not on file    Sexually Abused: Not on file   Housing Stability:     Unable to Pay for Housing in the Last Year: Not on file    Number of [place] in the Last Year: Not on file    Unstable Housing in the Last Year: Not on file       Family History:   No family history on file. Review of Systems:   Pertinent positives stated above in HPI. All other 10 systems were reviewed and were negative. Physical exam:   Constitutional:  VITALS:  BP 99/60   Pulse 83   Temp 96.6 °F (35.9 °C) (Bladder)   Resp 25   Ht 6' 2\" (1.88 m)   Wt 258 lb 9.6 oz (117.3 kg)   SpO2 94%   BMI 33.20 kg/m²   Gen: alert, awake, nad  HEENT: pupils reactive  Neck: no bruits or jvd noted  Cardiovascular:  S1, S2 without m/r/g; no lower extremity edema  Respiratory: CTA B without w/r/r; respiratory effort normal  Abdomen:  +bs, soft, nt, nd, no hepatosplenomegaly  Neuro/Psy: AAoriented times 3 ; moves all 4 ext    Data/  Recent Labs     12/04/21  1350 12/05/21  0857   WBC 21.2* 31.8*   HGB 13.0* 12.9*   HCT 38.6* 39.4*   .0* 113.4*    269     Recent Labs     12/04/21  1350 12/05/21  0857   * 134*   K 3.9 4.1   CL 98* 102   CO2 21 18*   GLUCOSE 116* 126*   PHOS  --  5.9*   BUN 91* 97*   CREATININE 3.6* 3.6*   LABGLOM 17* 17*   GFRAA 20* 20*     CT scan a/p wo contrast  Impression   1. Cholelithiasis with suggestion of gallbladder wall thickening.  If patient   has right upper quadrant abdominal pain, ultrasound would better assess   gallbladder. 2. Right lower lobe and left lobe airspace consolidations in the lungs.    Please correlate with clinical symptoms of pneumonia. 3. Nodular liver contour and splenomegaly.  Please correlate with clinical   history of cirrhosis and portal hypertension. 4. Small volume of ascites in the pelvis and along the mesentery. 5. Sigmoid diverticulosis.  No evidence of diverticulitis. 6. Nonobstructing left nephrolithiasis. 7. Bilateral exophytic renal cortical masses, including suspected   hemorrhagic/proteinaceous cyst  arising from the left lower renal pole. Additional hypodensities are not adequately assessed on this noncontrast exam   and would better be assessed on a nonemergent basis with MR per renal mass   protocol. RUQ USG  Impression   Cholelithiasis with a significant amount of echogenic material in the   gallbladder suggesting biliary sludge.  The gallbladder is dilated.       6 cm cyst inferiorly in the right kidney       UA w micro trace protein ow bland     Assessment  -REJI in the setting of septic shock and use of motrin. UA bland and no obstruction noted    -Septic shock   Likely from acute cholecystitis  -hyponatremia    -Metabolic acidosis    -b/l renal cortical masses   Will need MRI at a later date      Plan  -Maintain hemodynamics: Keep MAP>65   -another 1l NS bolus   -IVF 1/2 NS +75 meq sod bicarb @100 ml/h  -cautious dosing of vancomycin  -Serial renal panel  -Daily wts and strict i/o  -Renal dose meds and avoid nephrotoxins      Thank you for the consultation. Please do not hesitate to call with questions. Jose De Jesus Orozco MD  Office: [phone]  Fax:    [phone]  [facility]. [facility]

## 2021-12-05 NOTE — CONSULTS
Department of General Surgery Consult    PATIENT NAME: Dana Garcia   YOB: 1945    ADMISSION DATE: 12/4/2021  1:35 PM      TODAY'S DATE: 12/5/2021    Reason for Consult:  Acute cholecystitis    Chief Complaint: abd pain    Requesting Physician:  Julio Cesar Dickerson     HISTORY OF PRESENT ILLNESS:              The patient is a 78 y.o. male who presents with four days of SOB and RUQ pain. Some nausea and emesis. Some diarrhea. Not eating much.     Past Medical History:        Diagnosis Date    Arthritis     Atrial fibrillation ([facility] [address]     Cancer ([facility] [address]     neuroendocrine tumor; pancreatic cancer    COPD (chronic obstructive pulmonary disease) ([facility] [address]     Depression     Gout     Hyperlipidemia     Hypertension     PTSD (post-traumatic stress disorder)        Past Surgical History:        Procedure Laterality Date    BACK SURGERY      lumbar laminectomy    CARPAL TUNNEL RELEASE Bilateral     JOINT REPLACEMENT      left knee       Current Medications:   Current Facility-Administered Medications: influenza quadrivalent split vaccine (FLUZONE;FLUARIX;FLULAVAL;AFLURIA) injection 0.5 mL, 0.5 mL, IntraMUSCular, Prior to discharge  vancomycin (VANCOCIN) 500 mg in dextrose 5 % 100 mL IVPB, 500 mg, IntraVENous, Once  0.9 % sodium chloride bolus, 1,000 mL, IntraVENous, Once  sodium bicarbonate 75 mEq in sodium chloride 0.45 % 1,000 mL infusion, , IntraVENous, Continuous  norepinephrine (LEVOPHED) 16 mg in dextrose 5 % 250 mL infusion, 2-100 mcg/min, IntraVENous, Titrated  aspirin EC tablet 81 mg, 81 mg, Oral, Daily  atorvastatin (LIPITOR) tablet 40 mg, 40 mg, Oral, Nightly  sodium chloride flush 0.9 % injection 5-40 mL, 5-40 mL, IntraVENous, 2 times per day  sodium chloride flush 0.9 % injection 5-40 mL, 5-40 mL, IntraVENous, PRN  0.9 % sodium chloride infusion, 25 mL, IntraVENous, PRN  acetaminophen (TYLENOL) tablet 650 mg, 650 mg, Oral, Q6H PRN **OR** acetaminophen (TYLENOL) suppository 650 mg, 650 mg, Rectal, Q6H PRN  norepinephrine (LEVOPHED) 16 mg in dextrose 5 % 250 mL infusion, 2-100 mcg/min, IntraVENous, Titrated  heparin (porcine) injection 5,000 Units, 5,000 Units, SubCUTAneous, 3 times per day  [COMPLETED] cefepime (MAXIPIME) 2000 mg IVPB minibag, 2,000 mg, IntraVENous, Once **FOLLOWED BY** cefepime (MAXIPIME) 1000 mg IVPB minibag, 1,000 mg, IntraVENous, Q12H  metronidazole (FLAGYL) 500 mg in NaCl 100 mL IVPB premix, 500 mg, IntraVENous, Q8H  pantoprazole (PROTONIX) injection 40 mg, 40 mg, IntraVENous, Daily  ipratropium-albuterol (DUONEB) nebulizer solution 1 ampule, 1 ampule, Inhalation, Q4H PRN  vancomycin (VANCOCIN) intermittent dosing (placeholder), , Other, RX Placeholder  Prior to Admission medications    Medication Sig Start Date End Date Taking? Authorizing Provider   allopurinol (ZYLOPRIM) 100 MG tablet Take 200 mg by mouth daily    Historical Provider, MD   atorvastatin (LIPITOR) 40 MG tablet Take 40 mg by mouth nightly    Historical Provider, MD   buPROPion (WELLBUTRIN XL) 150 MG extended release tablet Take 150 mg by mouth every morning    Historical Provider, MD   hydrOXYzine (VISTARIL) 25 MG capsule Take 25 mg by mouth 3 times daily as needed for Anxiety    Historical Provider, MD   lisinopril (PRINIVIL;ZESTRIL) 5 MG tablet Take 5 mg by mouth daily    Historical Provider, MD   omeprazole (PRILOSEC) 20 MG delayed release capsule Take 20 mg by mouth daily    Historical Provider, MD   sertraline (ZOLOFT) 100 MG tablet Take 100 mg by mouth daily    Historical Provider, MD   aspirin 81 MG tablet Take 81 mg by mouth daily    Historical Provider, MD   senna (SENOKOT) 8.6 MG tablet Take 1 tablet by mouth 2 times daily    Historical Provider, MD   pregabalin (LYRICA) 100 MG capsule Take 100 mg by mouth 2 times daily. Historical Provider, MD   oxyCODONE (OXYCONTIN) 20 MG extended release tablet Take 20 mg by mouth every 12 hours.     Historical Provider, MD   albuterol sulfate  (90 Base) MCG/ACT inhaler Inhale 2 puffs into the lungs every 6 hours as needed for Wheezing    Historical Provider, MD   metoprolol tartrate (LOPRESSOR) 50 MG tablet Take 25 mg by mouth 2 times daily     Historical Provider, MD   traZODone (DESYREL) 100 MG tablet Take 100 mg by mouth nightly    Historical Provider, MD        Allergies:  Dye [iodides]    Social History:   TOBACCO: quit  ETOH:   yes    Family History:    No family history on file. REVIEW OF SYSTEMS:  CONSTITUTIONAL:  negative  HEENT:  negative  RESPIRATORY:  negative  CARDIOVASCULAR:  negative  GASTROINTESTINAL:  negative except for nausea, vomiting, diarrhea and abdominal pain  GENITOURINARY:  negative  HEMATOLOGIC/LYMPHATIC:  negative  NEUROLOGICAL:  Negative  * All other ROS reviewed and negative. PHYSICAL EXAM:  VITALS:  BP 99/60   Pulse 83   Temp 96.6 °F (35.9 °C) (Bladder)   Resp 25   Ht 6' 2\" (1.88 m)   Wt 258 lb 9.6 oz (117.3 kg)   SpO2 94%   BMI 33.20 kg/m²   24HR INTAKE/OUTPUT:    I/O last 3 completed shifts:   In: 1675 [P.O.:360; I.V.:1015; IV Piggyback:300]  Out: 65 [Urine:620]  I/O this shift:  In: -   Out: 135 [Urine:135]      CONSTITUTIONAL:  alert, no apparent distress and mildly obese  EYES:  PERRL, sclera clear  ENT:  Normocephalic,atraumatic, without obvious abnormality  NECK:  supple, symmetrical, trachea midline  LUNGS: Resp effort easy and unlabored  CARDIOVASCULAR:  NO JVD, regular rate  ABDOMEN:  , normal bowel sounds, soft, non-distended, mild RUq tender  MUSCULOSKELETAL: No clubbing or cyanosis, 0+ pitting edema lower extremities  NEUROLOGIC:  Mental Status Exam:  Level of Alertness:   awake  PSYCHIATRIC:   disoriented  SKIN:  no rashes    DATA:    CBC:   Recent Labs     12/04/21  1350 12/05/21  0857   WBC 21.2* 31.8*   HGB 13.0* 12.9*   HCT 38.6* 39.4*    269     BMP:    Recent Labs     12/04/21  1350 12/05/21  0857   * 134*   K 3.9 4.1   CL 98* 102   CO2 21 18*   BUN 91* 97*   CREATININE 3.6* 3.6*   GLUCOSE 116* 126*     Hepatic:   Recent Labs     12/04/21  1350 12/05/21  1000   AST 27 26   ALT 26 23   BILITOT 2.3* 2.0*   ALKPHOS 311* 313*     Mag:    No results for input(s): MG in the last 72 hours. Phos:     Recent Labs     12/05/21  0857   PHOS 5.9*      INR:   Recent Labs     12/05/21  1000   INR 1.45*       Radiology Review: Images personally reviewed by me. CT/US  - cholelithiasis, distended gb, pericholecystic fluid      IMPRESSION/RECOMMENDATIONS:    77 yo with acute cholecystitis  1. Continue bowel rest and abx  2. Pressors weaned off but WBC increased  3. Follow LFTs - if increasing then would consult GI to assess for choledocholithiasis/cholangitis  4.   If not improving by infectious standpoint in next 24  Hours will consider percutaneous cholecystostomy    Electronically signed by Deisy Guerrero MD     [address] Surgery  [id number]

## 2021-12-05 NOTE — ED NOTES
Pt gave verbal permission to update daughter. Updated daughter, Edmundo Morgan, by phone.      Shaun Sal RN  12/04/21 2055

## 2021-12-05 NOTE — VCC REMOTE MONITORING
Spoke with primary RN, Ceci Martin, regarding 3 hr sepsis bundle    Dexter Morgan, [address]

## 2021-12-05 NOTE — PROGRESS NOTES
4 Eyes Skin Assessment     NAME:  Zechariah Schneider  YOB: 1945  MEDICAL RECORD NUMBER:  [id number]    The patient is being assess for  Admission    I agree that 2 RN's have performed a thorough Head to Toe Skin Assessment on the patient. ALL assessment sites listed below have been assessed. Areas assessed by both nurses:    Other Head to Toe        Does the Patient have a Wound?  Other Red blanchable pressure areas on coccyx and Bilateral heels       [place] Prevention initiated:  Yes   Wound Care Orders initiated:  NA    Pressure Injury (Stage 3,4, Unstageable, DTI, NWPT, and Complex wounds) if present place consult order under [de-identified] NA    New and Established Ostomies if present place consult order under : NA      Nurse 1 eSignature: Electronically signed by Raymond Arias RN on 12/5/21 at 4:42 AM EST    **SHARE this note so that the co-signing nurse is able to place an eSignature**    Nurse 2 eSignature: Electronically signed by James Smith RN on 12/5/21 at 4:43 AM EST

## 2021-12-06 NOTE — PROGRESS NOTES
Transferred to A2 via A2 staff by bed. Belongings sent with patient. Skin checked at bedside with A2 RNs, legs arms & abd.  4 Eyes Skin Assessment     The patient is being assess for   {Blank single:79120::\"Admission\",\"Transfer to New Unit\",\"Post-Op Surgical\",\"Cath Lab Post-Op\",\"Shift Handoff\"}    I agree that 2 RN's have performed a thorough Head to Toe Skin Assessment on the patient. ALL assessment sites listed below have been assessed. Areas assessed for pressure by both nurses:   [x]   Head, Face, and Ears   [x]   Shoulders, Back, and Chest, Abdomen  [x]   Arms, Elbows, and Hands   [x]   Coccyx, Sacrum, and Ischium  [x]   Legs, Feet, and Heels        Skin Assessed Under all Medical Devices by both nurses:  {Medical devices:07469}              All Mepilex Borders were peeled back and area peeked at by both nurses:  {Yes/No:26164}  Please list where Mepilex Borders are located:  ***             **SHARE this note so that the co-signing nurse is able to place an eSignature**    Co-signer eSignature: Electronically signed by Sandrine Freed RN on 12/6/21 at 1:16 PM EST    Does the Patient have Skin Breakdown related to pressure?   {Blank single:06450::\"No\",\"Yes LDA WOUND CARE was Initiated documentation include the Kayley-wound, Wound Assessment, Measurements, Dressing Treatment, Drainage, and Color\",\"}     (Insert Photo here***)         Korey Prevention initiated:  {YES/NO:19732}   Wound Care Orders initiated:  {YES/NO:19732}      [facility] nurse consulted for Pressure Injury (Stage 3,4, Unstageable, DTI, NWPT, Complex wounds)and New or Established Ostomies:  {YES/NO:19732}      Primary Nurse eSignature: {Esignature:[id number]}

## 2021-12-06 NOTE — PROGRESS NOTES
Hospitalist Progress Note      PCP: Mila Morel MD    Date of Admission: 12/4/2021    Chief Complaint: Generalized weakness    Hospital Course:   Vern Augustin a 78 y. o. male w/ hx of COPD on 4L NC as needed, HTN, HLD, afib, and neuroendocrine pancreatic cancer who presents with shortness of breath and fatigue.  Reports a few days before [place] he began feeling tired, feverish, and short of breath.  He took some Tylenol which did help with the fever.  Since [place] he has continued to feel weak, rundown, nauseous, SOB, had loss of appetite and diarrhea.  He feels the weakness is worse in his legs.  He denies any vomiting but there is evidence of dark brown emesis in oral cavity, on chest, and on the front of his shirt. He  complaint right upper quadrant pain in the ER      Subjective:   Patient was seen in the ICU. Confused this AM and not able to participate in full history taking. Follows commands. Briefly denies: CP, Abd pain  Endorsed: SOB   IV Pressors titrated to off 12/5/21  [place] to [place] in ICU    Plan: Transfer to Medical Floor    Bedside rounding with nursing completed. No new medical complaints.        Medications:  Reviewed    Infusion Medications    dextrose      IV infusion builder 100 mL/hr at 12/06/21 1847    norepinephrine Stopped (12/05/21 1130)    sodium chloride      norepinephrine Stopped (12/04/21 1900)     Scheduled Medications    vancomycin  500 mg IntraVENous Q12H    influenza virus vaccine  0.5 mL IntraMUSCular Prior to discharge    aspirin  81 mg Oral Daily    atorvastatin  40 mg Oral Nightly    sodium chloride flush  5-40 mL IntraVENous 2 times per day    heparin (porcine)  5,000 Units SubCUTAneous 3 times per day    cefepime  1,000 mg IntraVENous Q12H    metroNIDAZOLE  500 mg IntraVENous Q8H    pantoprazole  40 mg IntraVENous Daily    vancomycin (VANCOCIN) intermittent dosing (placeholder)   Other RX Placeholder     PRN Meds: dextrose, glucose, dextrose, glucagon (rDNA), dextrose, sodium chloride flush, sodium chloride, acetaminophen **OR** acetaminophen, ipratropium-albuterol      Intake/Output Summary (Last 24 hours) at 12/6/2021 1936  Last data filed at 12/6/2021 1856  Gross per 24 hour   Intake 4998.97 ml   Output 820 ml   Net 4178.97 ml       Physical Exam Performed:    /82   Pulse 79   Temp 93.8 °F (34.3 °C) (Rectal)   Resp 16   Ht 6' 2\" (1.88 m)   Wt 266 lb 12.1 oz (121 kg)   SpO2 99%   BMI 34.25 kg/m²     General appearance: No apparent distress, appears stated age and cooperative. Pleasant and confused. Not able to answer all questions appropriately. HEENT: Pupils equal, round, and reactive to light. Conjunctivae/corneas clear. Neck: Supple, with full range of motion. No jugular venous distention. Trachea midline. Respiratory:  Normal respiratory effort. Decreased breath sound. Poor aeration to bases bilat. Poor ailin[hragmatic excursion. Easy and non labored breathing. Cardiovascular: Regular rate and rhythm with normal S1/S2 without murmurs, rubs or gallops. Abdomen: Soft, non-tender, non-distended with normal bowel sounds. Obese, protuberant. No guarding or rebound. Musculoskeletal: No clubbing, cyanosis or edema bilaterally. Full range of motion without deformity. Skin: Skin color, texture, turgor normal.  No rashes or lesions. Neurologic:  Neurovascularly intact without any focal sensory/motor deficits. Cranial nerves: II-XII intact, grossly non-focal.  Psychiatric: Confused and unable to answer questions. Required verbal redirection.     Capillary Refill: Brisk,3 seconds, normal   Peripheral Pulses: +2 palpable, equal bilaterally       Labs:   Recent Labs     12/04/21  1350 12/05/21  0857 12/06/21  0923   WBC 21.2* 31.8* 36.0*   HGB 13.0* 12.9* 12.6*   HCT 38.6* 39.4* 38.0*    269 286     Recent Labs     12/05/21  0857 12/05/21  1841 12/06/21  0923   * 134* 136   K 4.1 4.0 3.9    102 102 CO2 18* 18* 19*   BUN 97* 101* 109*   CREATININE 3.6* 3.5* 3.6*   CALCIUM 7.9* 8.0* 7.9*   PHOS 5.9*  --   --      Recent Labs     12/04/21  1350 12/05/21  1000 12/06/21  0535   AST 27 26 28   ALT 26 23 22   BILIDIR  --  1.3* 1.1*   BILITOT 2.3* 2.0* 1.9*   ALKPHOS 311* 313* 292*     Recent Labs     12/05/21  1000   INR 1.45*     Recent Labs     12/04/21  1350   TROPONINI 0.01       Urinalysis:      Lab Results   Component Value Date    NITRU Negative 12/04/2021    WBCUA 3-5 12/04/2021    BACTERIA Rare 12/04/2021    RBCUA 3-4 12/04/2021    BLOODU Negative 12/04/2021    SPECGRAV >=1.030 12/04/2021    GLUCOSEU Negative 12/04/2021       Radiology:  XR CHEST PORTABLE   Final Result   Right IJ central venous catheter with catheter tip cavoatrial junction. Bibasilar atelectasis/pneumonitis. Pulmonary vascular congestion. US GALLBLADDER RUQ   Final Result   Cholelithiasis with a significant amount of echogenic material in the   gallbladder suggesting biliary sludge. The gallbladder is dilated. 6 cm cyst inferiorly in the right kidney         CT ABDOMEN PELVIS WO CONTRAST Additional Contrast? None   Final Result   1. Cholelithiasis with suggestion of gallbladder wall thickening. If patient   has right upper quadrant abdominal pain, ultrasound would better assess   gallbladder. 2. Right lower lobe and left lobe airspace consolidations in the lungs. Please correlate with clinical symptoms of pneumonia. 3. Nodular liver contour and splenomegaly. Please correlate with clinical   history of cirrhosis and portal hypertension. 4. Small volume of ascites in the pelvis and along the mesentery. 5. Sigmoid diverticulosis. No evidence of diverticulitis. 6. Nonobstructing left nephrolithiasis. 7. Bilateral exophytic renal cortical masses, including suspected   hemorrhagic/proteinaceous cyst  arising from the left lower renal pole.    Additional hypodensities are not adequately assessed on this noncontrast exam   and would better be assessed on a nonemergent basis with MR per renal mass   protocol. XR CHEST PORTABLE   Final Result   Mild right basilar atelectasis. US ABSCESS DRAINAGE PERITONEAL/RETROPERITONEAL PERC    (Results Pending)   CT PERC CHOLECYSTOSTOMY    (Results Pending)   CT GUIDED NEEDLE PLACEMENT    (Results Pending)           Assessment/Plan:    Active Hospital Problems    Diagnosis     REJI (acute kidney injury) ([address] [N17.9]     Pulmonary infiltrates [R91.8]     Normal anion gap metabolic acidosis [C46.9]     Leukocytosis [D72.829]     Elevated alkaline phosphatase level [R74.8]     Calculus of gallbladder with acute cholecystitis without obstruction [K80.00]     Splenomegaly [R16.1]     Macrocytosis [D75.89]     Cholecystitis [K81.9]     Shock, septic ([address] [A41.9, R65.21]      Septic Shock with worsening leukocytosis  -Secondary to acute cholecystitis. Pulmonary infiltrates seen on CT Abd, CXR wihtout acute ASD. - Transfer to ICU and started on IV Levophed. Titrated off on 12/5 HS. -Micro: Blood Clx: NGTD, UA bland,   - Abx:  Cefepime, Flagyl, Vanco  - Consult General Surg:  IR to place percutaneous cholecysostomy tube 12/6. Biliary drainage to be collected by IR at T tube insertion. Hypothermia:   -Secondary to sepsis and infection  -Nursing instructed to verify temp rectally  -Joey Tidwell for Temperature stability  -Continue to treat infection as outlined above. Acute kidney injury   -Secondary to hypotension, hypovolemia, infection, and Motrin  - continue with IV hydration   - Labs: check daily BMP  -Avoid unnecessary nephrotoxins. -Judicious use of Vanco and taper with ID Micro culture data  - Consult nephrology: Bilat Renal masses will need MRI FU once pt stable.      Metabolic Acidosis:   -Secondary to REJI/poor renal function  -IV Bicarb gtt  -Labs: BMP    Essential Hypertension and HLD without CAD  - Hypotensive/Shock on POA  - Meds: antihypertensive medication on hold due to hypotension. Restart Lipitor  -Cont to optimize BP and Cholesterol as outpt     Macrocytic Anemia  -Stable  -Labs: B12, Folate in range  -Iron labs ordered  -Continue to follow    History of a neuroendocrine pancreatic cancer  -Stable  -Cont to FU with outpt Oncologist.     DVT Prophylaxis: Heparin subcu  Diet: Diet NPO Exceptions are: Ice Chips, Sips of Water with Meds  Code Status: Full Code    PT/OT Eval Status: Ordered    Dispo - Expect 2-3 days pending clinical response to medical therapy.      Emelia Figueroa MD

## 2021-12-06 NOTE — PROGRESS NOTES
PS Dr. Huizar Du the following:    \"RE: Desire Painter 1945 RM: 204 Hi. Can we get an order for restraints? Patient got a right sided drain placed and is trying to pull the drain out. Thank you. \"    Will monitor for orders.

## 2021-12-06 NOTE — PLAN OF CARE
Problem: Falls - Risk of:  Goal: Will remain free from falls  Description: Will remain free from falls  Outcome: Ongoing  Bed alarm on  Goal: Absence of physical injury  Description: Absence of physical injury  Outcome: Ongoing     Problem: Pain:  Goal: Pain level will decrease  Description: Pain level will decrease  Outcome: Ongoing  No c/o abd pain, morphine prn  Goal: Control of acute pain  Description: Control of acute pain  Outcome: Ongoing  Goal: Control of chronic pain  Description: Control of chronic pain  Outcome: Ongoing     Problem: Skin Integrity:  Goal: Will show no infection signs and symptoms  Description: Will show no infection signs and symptoms  Outcome: Ongoing  Goal: Absence of new skin breakdown  Description: Absence of new skin breakdown  Outcome: Ongoing

## 2021-12-06 NOTE — PROGRESS NOTES
Attempted to call patient's daughter Shant Waller regarding restraint order for the patient. No answer. Left general voicemail and left nursing station call back number. Will monitor.

## 2021-12-06 NOTE — PROGRESS NOTES
PS Dr. Vishnu Zepeda the following: \"His rectal temperature was 93.8\"    Will monitor for response.

## 2021-12-06 NOTE — PROGRESS NOTES
Daughter Anshu Gonzalez called with update by ICU RN and informed of transfer to A2 room 204.  Gareth Menon RN

## 2021-12-06 NOTE — PROGRESS NOTES
Called and spoke to pts daughter Saeed Call [phone]. Phone consent was obtained for gallbladder drain placement. Dr Sal Berger also spoke to daughter on the phone.

## 2021-12-06 NOTE — PROGRESS NOTES
PS Dr. Godoy Brochure the following:    \"RE: Justin Leahy 1945 RM: 204 Hi. FYI, patient's temperature has been running 94.4 - 95 today. Do you want us to place a bear hugger on him. Thank you. \"    Dr. Godoy Brochlinda responded with the following:    \"Please confirm with rectal temp. He was not hypothermic previously. \"    Will monitor.

## 2021-12-06 NOTE — SEDATION DOCUMENTATION
Patient is tolerating the procedure well. No distress noted. Patient remains on all cardiac monitoring and is on 2L NC at this time. Will continue to monitor.

## 2021-12-06 NOTE — PROGRESS NOTES
Dr Julee Pacheco into see patient for Nephrology, aware of elevated BUN & creat. Monitoring at this time.  Rashad Luna RN

## 2021-12-06 NOTE — PROGRESS NOTES
Pt very restless and trying to get out of the bed. States that he is in pain located in his upper right quadrant of his abdomen. Intermittently confused. Currently Alert and orientedx3, was unable to say the month/year. MD notified, request in for pain medication. Awaiting response.

## 2021-12-06 NOTE — PROGRESS NOTES
[facility]    PATIENT NAME: [place]     TODAY'S DATE: 12/6/2021    CHIEF COMPLAINT: Abdominal pain    INTERVAL HISTORY/HPI:    Pt with persistent RUQ pain. Has some nausea as well. REVIEW OF SYSTEMS:  Pertinent positives and negatives as per interval history section    OBJECTIVE:  VITALS:  /69   Pulse 84   Temp 94.4 °F (34.7 °C) (Bladder)   Resp 18   Ht 6' 2\" (1.88 m)   Wt 266 lb 12.1 oz (121 kg)   SpO2 99%   BMI 34.25 kg/m²     INTAKE/OUTPUT:    I/O last 3 completed shifts: In: 4081 [P.O.:30; I.V.:4051]  Out: 471 [Urine:755]  I/O this shift: In: 0   Out: 235 [Urine:235]    CONSTITUTIONAL:  awake and alert  LUNGS:  Respirations easy and unlabored  CARD:  regular rate and rhythm  ABDOMEN:  normal bowel sounds, soft, non-distended, tenderness noted in the right upper quadrant      Data:  CBC: Recent Labs     12/04/21  1350 12/05/21  0857 12/06/21  0923   WBC 21.2* 31.8* 36.0*   HGB 13.0* 12.9* 12.6*   HCT 38.6* 39.4* 38.0*    269 286     BMP:    Recent Labs     12/05/21  0857 12/05/21  1841 12/06/21  0923   * 134* 136   K 4.1 4.0 3.9    102 102   CO2 18* 18* 19*   BUN 97* 101* 109*   CREATININE 3.6* 3.5* 3.6*   GLUCOSE 126* 109* 88     Hepatic:   Recent Labs     12/04/21  1350 12/05/21  1000 12/06/21  0535   AST 27 26 28   ALT 26 23 22   BILITOT 2.3* 2.0* 1.9*   ALKPHOS 311* 313* 292*     Mag:    No results for input(s): MG in the last 72 hours. Phos:     Recent Labs     12/05/21  0857   PHOS 5.9*      INR:   Recent Labs     12/05/21  1000   INR 1.45*       Radiology Review:  *Imaging personally reviewed by me. CT shows cholecystitis      ASSESSMENT AND PLAN:  Acute cholecystitis with worsening leukocytosis. Continue antibiotics and supportive care.  Proceed with percutaneous cholecystostomy by IR     Electronically signed by Vin Cr MD     [id number]

## 2021-12-06 NOTE — PROGRESS NOTES
Intensive Care Progress Note    Patient: Wilfredo Irby MRN: [id number]  Date of  Admission: 12/4/2021   YOB: 1945  Age: 78 y.o. Sex: male    Unit: [facility]  Room/Bed: 0231/0231-01 Attending Physician: Haris Billy MD   Admitting Physician: Graydon Soulier          Chief Complaint/Referring Provider:  Patient is being seen at the request of Dr. Natali Sotomayor  for a consultation for septic shock      Presenting HPI: Patient was brought to the hospital with generalized weakness/shortness of breath        As per  ER provider-Jose Staley is a 78 y. o. male w/ hx of COPD on 4L NC as needed, HTN, HLD, afib, and neuroendocrine pancreatic cancer who presents with shortness of breath and fatigue.  Reports a few days before Thanksgiving he began feeling tired, feverish, and short of breath.  He took some Tylenol which did help with the fever.  Since Thanksgiving he has continued to feel weak, rundown, nauseous, SOB, had loss of appetite and diarrhea.  He feels the weakness is worse in his legs.  He denies any vomiting but there is evidence of dark brown emesis in oral cavity, on chest, and on the front of his shirt.   ED course -This is a 33-year-old male who presents with shortness of breath, nausea, and weakness.  Upon arrival, he is ill-appearing, pale, and hypotensive.  On exam he he has dry mucous membranes with dried black spots on his tongue, neck, and chest.  Right lower quadrant was tender to palpation, he has generalized weakness, and diminished reflexes.  Patient was started on fluids for hypotension. CT abdomen was ordered, pending.  CXR showed mild right basilar atelectasis and so with hx of COPD patient was given Solu-Medrol. Troponin, lipase, and lactate were unremarkable. UA and occult stool collected, pending.  WBC was 21.2, H/H 13.0/38.6, .0, BUN 91, creatinine 3.6, GFR 17 total bilirubin 2.3, and alk phos 311.0.   To the ICU for mother management for septic shock for further management  Patient when seen this morning continues to be critically ill, patient was on IV Levophed infusion to maintain hemodynamics, patient's norepinephrine infusion requirement had gone up overnight, patient was afebrile, patient on questioning further does not have any significant profound confusion which he had last night as per nursing, patient complaining of generalized body aches and chronic pain, patient also is complaining of right abdominal pain, patient also has been having diarrhea and constipation as per the patient, patient does not have any significant expectoration, patient does not have any significant epistaxis or hemoptysis or any hematochezia or melena, patient does not have any increasing leg edema, patient has had some shortness of breath, patient on questioning further apparently has chronic respiratory failure requiring 4 L of nasal cannula oxygen at home,  Patient when seen also had some hypothermia overnight, patient was on 2 L of nasal can oxygen with saturation of 96% when evaluated, patient has had no urine output overnight with cumulative fluid balance of +860 mL, patient's p.o. intake has been on the lower side, patient does not give any other pertinent review of system of concern objectivelyb    12/4/2021admit      12/5/2021pulmonary consulted, Levophed started and stopped    12/6/2021off of Levophed no issues with pressures        Subjective: Still a bit confused  No issues overnight  Did not require pressors  Still uncomfortable with abdominal pain  No nausea vomiting    ROS:A comprehensive review of systems was negative except for: Above    Objective: Intake and Output:   Current Shift:   No intake/output data recorded.   Last three shifts:   12/05 0701 - 12/06 0700  In: 4081 [P.O.:30; I.V.:4051]  Out: 755 [Urine:755]    Vent settings for last 24 hours:  [unfilled]    Hemodynamic parameters for last 24 hours:  [unfilled]    Physical Exam:   Patient Vitals for the past 24 hrs:   BP Temp Temp src Pulse Resp SpO2 Weight   12/06/21 0739    87      12/06/21 0600 115/69   74  90 %    12/06/21 0530       266 lb 12.1 oz (121 kg)   12/06/21 0500 111/70   71 20 94 %    12/06/21 0400 121/73 95.7 °F (35.4 °C) Bladder 71 21 94 %    12/06/21 0300 122/64   74 24     12/06/21 0200 80/62   80 20     12/06/21 0100 123/72   71 23 98 %    12/06/21 0000 116/77 95.9 °F (35.5 °C) Bladder 70 19 99 %    12/05/21 2300 114/68   77 17 99 %    12/05/21 2200 108/65   76 14 99 %    12/05/21 2100 101/74   78 13 99 %    12/05/21 2000 114/69 95.1 °F (35.1 °C) Bladder 79 14 98 %    12/05/21 1900 114/77   81 22 99 %    12/05/21 1800 111/67 96 °F (35.6 °C) Bladder 68 18     12/05/21 1730 119/71   65 25     12/05/21 1700 102/68   80 23 98 %    12/05/21 1630 106/66   67 28 100 %    12/05/21 1600 109/62   80 17 99 %    12/05/21 1530 118/68   80 20     12/05/21 1500 117/64 95.5 °F (35.3 °C) Bladder 75 24 100 %    12/05/21 1400    81 20 100 %    12/05/21 1345 104/61   80 18 100 %    12/05/21 1330 100/66         12/05/21 1315 106/75         12/05/21 1300 103/73         12/05/21 1245 (!) 104/58   72      12/05/21 1230 112/60   77 18 96 %    12/05/21 1220 99/60   83  94 %    12/05/21 1200 101/68 96.6 °F (35.9 °C) Bladder 79 25 97 %    12/05/21 1145 102/64   80 22 93 %    12/05/21 1130 109/60   76 19 100 %    12/05/21 1115 108/67   77 24 96 %    12/05/21 1100 117/72   73 (!) 31 96 %    12/05/21 1045 107/73   72 20     12/05/21 1035 (!) 104/53   73 23 97 %    12/05/21 1000 (!) 102/90   73 25 100 %    12/05/21 0950 110/60   70 18 99 %    12/05/21 0930 107/72   73      12/05/21 0915 116/75   74 20     12/05/21 0900 123/68   84 18 90 %    12/05/21 0845 103/83   84 25 100 %    12/05/21 0830 123/76   82 24 96 %             Physical Exam  Vitals and nursing note reviewed. Constitutional:       Appearance: Normal appearance. He is obese. HENT:      Head: Normocephalic and atraumatic. Right Ear: External ear normal.      Left Ear: External ear normal.      Nose: Nose normal.      Mouth/Throat:      Mouth: Mucous membranes are moist.      Pharynx: Oropharynx is clear. Eyes:      General:         Right eye: No discharge. Left eye: No discharge. Extraocular Movements: Extraocular movements intact. Conjunctiva/sclera: Conjunctivae normal.      Pupils: Pupils are equal, round, and reactive to light. Cardiovascular:      Rate and Rhythm: Normal rate and regular rhythm. Pulses: Normal pulses. Heart sounds: No murmur heard. Pulmonary:      Effort: No respiratory distress. Breath sounds: No wheezing or rales. Comments: Decreased breath sounds at the bases  Abdominal:      Comments: Slightly tender and decreased but bowel sounds   Musculoskeletal:         General: No swelling. Normal range of motion. Cervical back: Normal range of motion. No rigidity. Skin:     General: Skin is warm and dry. Coloration: Skin is not jaundiced or pale. Neurological:      Mental Status: He is alert. He is disoriented. Cranial Nerves: No cranial nerve deficit. Sensory: No sensory deficit. Psychiatric:      Comments: Agitated but when talked to patient will calm              Labs:   Recent Labs     12/04/21  1350 12/05/21  0857   WBC 21.2* 31.8*   HGB 13.0* 12.9*   HCT 38.6* 39.4*    269      Recent Labs     12/04/21  1350 12/05/21  0857 12/05/21  1841   * 134* 134*   K 3.9 4.1 4.0   CL 98* 102 102   CO2 21 18* 18*   BUN 91* 97* 101*   GLUCOSE 116* 126* 109*       Additional labs:    Radiology, images personally reviewed. Not indicated      Other imaging:  Ct of abdomen  Impression:        1.  Cholelithiasis with suggestion of gallbladder wall thickening.  If patient   has right upper quadrant abdominal pain, ultrasound would better assess   gallbladder. 2. Right lower lobe and left lobe airspace consolidations in the lungs. Please correlate with clinical symptoms of pneumonia. 3. Nodular liver contour and splenomegaly.  Please correlate with clinical   history of cirrhosis and portal hypertension. 4. Small volume of ascites in the pelvis and along the mesentery. 5. Sigmoid diverticulosis.  No evidence of diverticulitis. 6. Nonobstructing left nephrolithiasis. 7. Bilateral exophytic renal cortical masses, including suspected   hemorrhagic/proteinaceous cyst  arising from the left lower renal pole. Additional hypodensities are not adequately assessed on this noncontrast exam   and would better be assessed on a nonemergent basis with MR per renal mass   protocol. Micro: Negative growth to date    Assessment:     Active Problems:    Shock, septic ([address]    Cholecystitis    REJI (acute kidney injury) ([address]    Pulmonary infiltrates    Normal anion gap metabolic acidosis    Leukocytosis    Elevated alkaline phosphatase level    Calculus of gallbladder with acute cholecystitis without obstruction    Splenomegaly    Macrocytosis  Resolved Problems:    * No resolved hospital problems.  *      Discussion / Plan:       Neurology  History of PTSD, depression      Cardiovascular  History of atrial fibrillation, hypertension  Continue with  Aspirin 81 mg daily  Lipitor 40 mg nightly        Pulmonary  No issues currently  Wean oxygen as tolerated    Gastrointestinal   Pancreatic cancer    CT abdomen shows cholecystitis  General surgery has been consulted    On Maxipime  Flagyl    Protonix 40 mg daily    Endo  Blood sugars are controlled  TSH is normal     Renal  Renal insufficiency  Nephrology has been consulted  Creatinine holding at 3.5    Prophylaxis  Heparin 5000 every 8 hours  Protonix 40 mg daily      Lines  [place]  P IV    Code Status: Full code          Infectious disease  Septic shock  Off pressors since yesterday  Cefepime 1000 mg every 12 hours  Vancomycin  Flagyl 500 every 8 hours    Lactic acid 0.9        We will sign off the patient    Discussed at multidisciplinary rounds, with nursing, dietitian and pharmacy          Okay to transfer out of the [address] [place] [address] Provided: This patient had a critical illness or injury that acutely impaired one or more vital organ systems such that there was a high probability of imminent or life-threatening deterioration in the patient's condition. This required high complexity decision-making to assess, manipulate, and support vital system function(s) to treat single or multiple vital organ system failure and/or to prevent further life-threatening deterioration of the patient's condition. Total attending physician time, excluding unbundled procedures, spent at the bedside, and away from the bedside but on the unit or floor, reviewing laboratory test results, discussing care with medical staff, and discussing care with family when the patient was unable or incompetent to participate:   Critical Care Time (Minutes) # 35   (Discontinuous)           MD Edmundo Rivera  Pulmonary, Critical Care and Sleep Medicine  Office : [phone]    Please note that some or all of this record was generated using voice recognition software. If there are any questions about the content of this document, please contact the author as some errors in transcription may have occurred.

## 2021-12-06 NOTE — PROGRESS NOTES
Daughter Juliana Marrero called back and spoke with this writer and was informed and notified of patient's current condition. Juliana Marrero was informed that drain was placed today and patient was confused. Restraints were placed. Juliana Marrero acknowledged this. Will continue to monitor.

## 2021-12-06 NOTE — PROGRESS NOTES
Image guided Gallbladder drain insertion right completed. Dr. Yohannes Mott placed 10 Hebrew 25 cm exodus Array LOT # [id number] EXP 9/30/2024 in the right. Drain/tube secured at the 18 cm line with sutures. 10 milliliters of brown colored withdrawn immediately, sent to lab. Drain/tube dressing clean, dry, and intact. Pt tolerated procedure without any signs or symptoms of distress. Vital signs stable. Report called to RN. Pt transported back to [facility] in stable condition via bed by transport.      Medications  Versed: 0.5 mg  Fentanyl: 25 mcg    Vital Signs  Vitals:    12/06/21 1647   BP:    Pulse: 87   Resp: 18   Temp:    SpO2: 93%    (vital signs in table format)    Post Constantine  2 - Able to move 4 extremities voluntarily on command  2 - BP+/- 20mmHg of normal  2 - Fully awake  1 - Needs oxygen to maintain oxygen saturation >90%  2 - Able to breathe deeply and cough freely

## 2021-12-06 NOTE — PROGRESS NOTES
Dr Kali Bellamy paged through perfect serve after Hospitalist into see patient  Concerning abnormal labs and worsening condition. Patient lethargic this am, restless at times. No c/o pain. Waiting return call from general surgery.  Roberto Overton RN

## 2021-12-06 NOTE — PROGRESS NOTES
Vitals:    12/06/21 1700   BP: 111/75   Pulse: 83   Resp: 16   Temp: 93.8 °F (34.3 °C)   SpO2: 97%    MD aware of temp.

## 2021-12-06 NOTE — PRE SEDATION
Sedation Pre-Procedure Note    Patient Name: Vignesh Moreland   YOB: 1945  Room/Bed: 0204/0204-01  Medical Record Number: [id number]  Date: 12/6/2021   Time: 4:04 PM       Indication: acute cholecystitis, worsening luekocytosis    Consent: I have discussed with the patient and/or the patient representative the indication, alternatives, and the possible risks and/or complications of the planned procedure and the anesthesia methods. The patient and/or patient representative appear to understand and agree to proceed. Vital Signs:   Vitals:    12/06/21 1515   BP:    Pulse:    Resp:    Temp: 94.4 °F (34.7 °C)   SpO2:        Past Medical History:   has a past medical history of Arthritis, Atrial fibrillation ([facility] Cancer ([facility] COPD (chronic obstructive pulmonary disease) ([facility] Depression, Gout, Hyperlipidemia, Hypertension, and PTSD (post-traumatic stress disorder). Past Surgical History:   has a past surgical history that includes joint replacement; back surgery; and Carpal tunnel release (Bilateral).     Medications:   Scheduled Meds:    mupirocin   Nasal BID    vancomycin  500 mg IntraVENous Q12H    influenza virus vaccine  0.5 mL IntraMUSCular Prior to discharge    aspirin  81 mg Oral Daily    atorvastatin  40 mg Oral Nightly    sodium chloride flush  5-40 mL IntraVENous 2 times per day    heparin (porcine)  5,000 Units SubCUTAneous 3 times per day    cefepime  1,000 mg IntraVENous Q12H    metroNIDAZOLE  500 mg IntraVENous Q8H    pantoprazole  40 mg IntraVENous Daily    vancomycin (VANCOCIN) intermittent dosing (placeholder)   Other RX Placeholder     Continuous Infusions:    IV infusion builder 100 mL/hr at 12/06/21 1443    norepinephrine Stopped (12/05/21 1130)    sodium chloride      norepinephrine Stopped (12/04/21 1900)     PRN Meds: sodium chloride flush, sodium chloride, acetaminophen **OR** acetaminophen, ipratropium-albuterol  Home Meds:   Prior to Admission medications Medication Sig Start Date End Date Taking? Authorizing Provider   allopurinol (ZYLOPRIM) 100 MG tablet Take 200 mg by mouth daily    Historical Provider, MD   atorvastatin (LIPITOR) 40 MG tablet Take 40 mg by mouth nightly    Historical Provider, MD   buPROPion (WELLBUTRIN XL) 150 MG extended release tablet Take 150 mg by mouth every morning    Historical Provider, MD   hydrOXYzine (VISTARIL) 25 MG capsule Take 25 mg by mouth 3 times daily as needed for Anxiety    Historical Provider, MD   lisinopril (PRINIVIL;ZESTRIL) 5 MG tablet Take 5 mg by mouth daily    Historical Provider, MD   omeprazole (PRILOSEC) 20 MG delayed release capsule Take 20 mg by mouth daily    Historical Provider, MD   sertraline (ZOLOFT) 100 MG tablet Take 100 mg by mouth daily    Historical Provider, MD   aspirin 81 MG tablet Take 81 mg by mouth daily    Historical Provider, MD   senna (SENOKOT) 8.6 MG tablet Take 1 tablet by mouth 2 times daily    Historical Provider, MD   pregabalin (LYRICA) 100 MG capsule Take 100 mg by mouth 2 times daily. Historical Provider, MD   oxyCODONE (OXYCONTIN) 20 MG extended release tablet Take 20 mg by mouth every 12 hours. Historical Provider, MD   albuterol sulfate  (90 Base) MCG/ACT inhaler Inhale 2 puffs into the lungs every 6 hours as needed for Wheezing    Historical Provider, MD   metoprolol tartrate (LOPRESSOR) 50 MG tablet Take 25 mg by mouth 2 times daily     Historical Provider, MD   traZODone (DESYREL) 100 MG tablet Take 100 mg by mouth nightly    Historical Provider, MD        Pre-Sedation Documentation and Exam:   I have reviewed the patient's history and review of systems.     Mallampati Airway Assessment:  Mallampati Class III - (soft palate & base of uvula are visible)    Prior History of Anesthesia Complications:   none    ASA Classification:  Class 4 - A patient with an incapacitating systemic disease that is a constant threat to life    Sedation/ Anesthesia Plan:   intravenous sedation    Medications Planned:   midazolam (Versed) intravenously and fentanyl intravenously    Patient is an appropriate candidate for plan of sedation: yes    Electronically signed by Da Sherwood MD on 12/6/2021 at 4:04 PM

## 2021-12-06 NOTE — PROGRESS NOTES
PS Dr. Gregg Severe the following:    \"Patient rectal temp was at 93.8. Patient's blood sugar was at 64 and he has been NPO. Can you place orders for D50 PRN? Thanks. \"    Will monitor for orders.

## 2021-12-06 NOTE — PROGRESS NOTES
[facility] Progress Note           Subjective/   78y.o. year old male who we are seeing in consultation for REJI. HPI:  Renal function slow to improve, urine output of 755 ml over last 24 hours. Bp's better, off pressors. ROS:  Mentation reduced, intake reduced, no fevers. Objective/   GEN:  Chronically ill, /68   Pulse 86   Temp 94.4 °F (34.7 °C) (Bladder)   Resp 14   Ht 6' 2\" (1.88 m)   Wt 266 lb 12.1 oz (121 kg)   SpO2 91%   BMI 34.25 kg/m²   HEENT: non-icteric, no JVD  CV: S1, S2 without m/r/g; no LE edema  RESP: CTA B without w/r/r; breathing wnl  ABD: +bs, soft, nt, no hsm  SKIN: warm, no rashes    Data/  Recent Labs     12/04/21  1350 12/05/21  0857 12/06/21  0923   WBC 21.2* 31.8* 36.0*   HGB 13.0* 12.9* 12.6*   HCT 38.6* 39.4* 38.0*   .0* 113.4* 111.9*    269 286     Recent Labs     12/05/21  0857 12/05/21  1841 12/06/21  0923   * 134* 136   K 4.1 4.0 3.9    102 102   CO2 18* 18* 19*   GLUCOSE 126* 109* 88   PHOS 5.9*  --   --    BUN 97* 101* 109*   CREATININE 3.6* 3.5* 3.6*   LABGLOM 17* 17* 17*   GFRAA 20* 21* 20*       Assessment/     -REJI in the setting of septic shock from acute cholecystitis and use of motrin. SCr up to mid 3 range   UA bland and no obstruction noted     -Septic shock              Likely from acute cholecystitis    -Hyponatremia     -Metabolic acidosis     -Bilateral renal cortical masses              Will need MRI at a later date    Plan/     - Continue IVF's with bicarb replacement  - Monitor vancomycin levels with dosing  - Trend labs, bp's, & urine output    ____________________________________  Shelly Bellamy MD  [facility]  [email or web address]  Office: [phone]

## 2021-12-06 NOTE — PROGRESS NOTES
Brief Postoperative Note    Jose Chawla  YOB: 1945  [id number]    Pre-operative Diagnosis: acute cholecystitis    Post-operative Diagnosis: Same    Procedure: CT guided cholecystostomy tube    Anesthesia: Moderate Sedation    Surgeons/Assistants: Parrish Do MD    Estimated Blood Loss: less than 5    Complications: None    Specimens: Was Obtained: dark bilious fluid    Findings: 10 Fr pigtail within the gall bladder lumen, draining dark bilious fluid    Electronically signed by Parrish Do MD on 12/6/2021 at 4:46 PM

## 2021-12-06 NOTE — SEDATION DOCUMENTATION
Pt arrived for image guided Gallbladder drain insertion right . Procedure explained including the risk and benefits of the procedure. All questions answered. Pt verbalizes understanding of the procedure and states no more questions. Consent confirmed. Vital signs stable. Labs, allergies, medications, and code status reviewed. No contraindications noted.      Vital Signs  Vitals:    12/06/21 1614   BP: 104/60   Pulse: 85   Resp: 10   Temp:    SpO2: 96%    (vital signs in table format)    Pre Constantine Score  2 - Able to move 4 extremities voluntarily on command  2 - BP+/- 20mmHg of normal  2 - Fully awake  2 - Able to maintain oxygen saturation >92% on room air  2 - Able to breathe deeply and cough freely    Allergies  Dye [iodides] (allergies)    Labs  Lab Results   Component Value Date    INR 1.45 (H) 12/05/2021    PROTIME 16.7 (H) 12/05/2021     Lab Results   Component Value Date    CREATININE 3.6 (H) 12/06/2021     (HH) 12/06/2021     12/06/2021    K 3.9 12/06/2021     12/06/2021    CO2 19 (L) 12/06/2021     Lab Results   Component Value Date    WBC 36.0 (H) 12/06/2021    HGB 12.6 (L) 12/06/2021    HCT 38.0 (L) 12/06/2021    .9 (H) 12/06/2021     12/06/2021

## 2021-12-06 NOTE — PROGRESS NOTES
Dr. Joy Renteria placed order for restraints. Patient placed in bilateral wrist restraints due to pulling drains and lines. Unable to redirect patient. DAQUAN remains in room.

## 2021-12-06 NOTE — PROGRESS NOTES
Rounds done with Dr Nadia Ventura & team. Heath Correa orders noted and move out orders given.  Cindy King RN

## 2021-12-06 NOTE — PROGRESS NOTES
Patient arrived to unit via hospital bed in stable condition with all belongings. VSS. Admission assessment completed as charted. Patient oriented to room, call light, bed, phone, lights, bathroom, and nurse. Patient educated on the daily schedule including vitals, lab draws, assessments, possible tests, schedule of MD rounding, daily weights and I's & O's. Patient instructed on current diet, how to use 8MENU, and TV. Patient unable verbalizes pain level. Patient denies any other needs at this time. Bed is in lowest locked position, call light is within reach, and side rails up 2/4. Will continue to monitor patient for pain, discomfort or any other needs. Patient currently on 3L O2 NC. All belongings transported with patient.

## 2021-12-06 NOTE — PROGRESS NOTES
Patient transported back to 204 in stable condition. CMU and AVASYS made aware. Dressing around drain placement clean, dry, and intact.

## 2021-12-07 NOTE — PROGRESS NOTES
12/07/21 1439   Vent Information   Skin Assessment Clean, dry, & intact   Vent Type 980   Vent Mode AC/VC+   Vt Ordered 500 mL   Rate Set 22 bmp   Pressure Support 0 cmH20   FiO2  100 %   Sensitivity 3   PEEP/CPAP 5   I Time/ I Time % 1.1 s   Vent Patient Data   Peak Inspiratory Pressure 37 cmH2O   Mean Airway Pressure 18 cmH20   Rate Measured 22 br/min   Vt Exhaled 534 mL   Minute Volume 11.8 Liters   I:E Ratio 1:1.50   Cough/Sputum   Sputum How Obtained Endotracheal; Suctioned   Cough Productive   Sputum Amount Moderate   Sputum Color Yellow; White   Tenacity Thick   Spontaneous Breathing Trial (SBT) RT Doc   Contraindications to SBT? (Patient just intubated.)   Pulse 56   Additional Respiratory  Assessments   Resp 20   Alarm Settings   High Pressure Alarm 45 cmH2O   Low Minute Volume Alarm 2 L/min   Apnea (secs) 20 secs   High Respiratory Rate 40 br/min   Low Exhaled Vt  200 mL   Adult IBW   Height 6' 2\" (1.88 m)   IBW/kg (Calculated) 82.2   Patient Observation   Observations ambu @ bedside   ETT (adult)   Placement Date/Time: 12/07/21 1405   Preoxygenation: Yes  Mask Ventilation: Ventilated by mask (1)  Technique: Rapid sequence; Video laryngoscopy  Type: Cuffed  Tube Size: 8 mm  Laryngoscope: GlideScope  Blade Size: 4  Location: Oral  Insertion attemp. ..    Secured at 26 cm   Measured From [address] [place] [address],[address] By [employer] tube [place]; [employer]

## 2021-12-07 NOTE — PROGRESS NOTES
12/07/21 1545   Vent Information   Vent Type 980   Vent Mode AC/VC+   Vt Ordered 500 mL   Rate Set 22 bmp   Pressure Support 0 cmH20   FiO2  60 %   SpO2 99 %   SpO2/FiO2 ratio 165   Sensitivity 3   PEEP/CPAP 5   I Time/ I Time % 1.1 s   Vent Patient Data   Peak Inspiratory Pressure 20 cmH2O   Mean Airway Pressure 10 cmH20   Rate Measured 22 br/min   Vt Exhaled 18 mL   Minute Volume 0.75 Liters   I:E Ratio 1:1.50   Spontaneous Breathing Trial (SBT) RT Doc   Pulse 64   Additional Respiratory  Assessments   Resp 12   Alarm Settings   High Pressure Alarm 45 cmH2O   Low Minute Volume Alarm 2 L/min   High Respiratory Rate 40 br/min   Low Exhaled Vt  200 mL   ETT (adult)   Placement Date/Time: 12/07/21 1405   Preoxygenation: Yes  Mask Ventilation: Ventilated by mask (1)  Technique: Rapid sequence; Video laryngoscopy  Type: Cuffed  Tube Size: 8 mm  Laryngoscope: GlideScope  Blade Size: 4  Location: Oral  Insertion attemp. ..    Secured at 26 cm   Measured From Lips   ET Placement Right   Secured By Commercial tube saeed   Cuff Pressure 30 cm H2O

## 2021-12-07 NOTE — PROGRESS NOTES
Vitals:    12/07/21 0748   BP: 109/64   Pulse: 81   Resp: 21   Temp: 97.6 °F (36.4 °C)   SpO2: 97%       Pt remains confused and alert but does not talk to RN. ERICKA drain remains clean dry and intact. Blood glucose remains low at 63.

## 2021-12-07 NOTE — FLOWSHEET NOTE
Responded to rapid response, no family present at bedside.  offered silent prayer for pt and those caring for him. Pt going to ICU, will follow up as needed.

## 2021-12-07 NOTE — PROCEDURES
Intubation Procedure Note    Indication: Respiratory failure and airway protection    Consent: Unable to be obtained due to the emergent nature of this procedure. Medications Used: None    Procedure: The patient was placed in the appropriate position. Cricoid pressure was not required. Intubation was performed by direct laryngoscopy using a laryngoscope and an 8.0 cuffed endotracheal tube. The cuff was then inflated and the tube was secured appropriately at a distance of 25 cm to the dental ridge. Initial confirmation of placement included bilateral breath sounds and an end tidal CO2 detector. A chest x-ray to verify correct placement of the tube has been ordered but is still pending. The patient tolerated the procedure well and will be transferred to the ICU    Complications: None. Jeanna Mooney, APRN-CNP

## 2021-12-07 NOTE — ACP (ADVANCE CARE PLANNING)
Advance Care Planning     General Advance Care Planning (ACP) Conversation    Date of Conversation: 12/4/2021  Conducted with: Patient with Decision Making Capacity   Healthcare Decision Maker: Named in Advance Directive or Healthcare Power of  (name) Julieta Thacker, daughter and POA    Healthcare Decision Maker:    Primary Decision Maker: Augustina Lynch - [phone]  Click here to complete Healthcare Decision Makers including selection of the Healthcare Decision Maker Relationship (ie \"Primary\"). Today we documented Decision Maker(s). The patient will provide ACP documents. Content/Action Overview:   Has ACP document(s) NOT on file - requested patient to provide  Reviewed DNR/DNI and patient elects Full Code (Attempt Resuscitation)        Length of Voluntary ACP Conversation in minutes:  <16 minutes (Non-Billable)    Mary Esqueda RN

## 2021-12-07 NOTE — PROGRESS NOTES
Pt's daughter Chantal Mcdowell called with update and made her aware that pt is now in room 234. Chantal Mcdowell tearfully stated she did not want to see her Dad like that right now but would be available by phone if any updates were to be given.

## 2021-12-07 NOTE — PLAN OF CARE
Problem: Falls - Risk of:  Goal: Will remain free from falls  Description: Will remain free from falls  12/7/2021 0148 by Ginny Mata RN  Outcome: Ongoing  Note: Pt will remain free from falls throughout hospital stay. Fall precautions in place, bed alarm on, bed in lowest position with wheels locked and side rails 2/4 up. Room door open and hourly rounding completed. Will continue to monitor throughout shift. Problem: Pain:  Goal: Control of acute pain  Description: Control of acute pain  12/7/2021 0148 by Ginny Mata RN  Outcome: Ongoing  Note: Pt will be satisfied with pain control. Pt uses faces pain rating scale with reassessments after pain med administration. Will continue to monitor progression throughout shift. Problem: Non-Violent Restraints  Goal: No harm/injury to patient while restraints in use  Outcome: Ongoing  Note: Continue to monitor pt and provide ROM and vs checks regularly.

## 2021-12-07 NOTE — PROGRESS NOTES
Bedside rounds completed with Ya Starks MD. Ya Starks MD made aware of critical ABG results face to face. Ya Starks MD made call to Prasanth Carroll MD to notify of critical ABG results. General surgery also at bedside at this time.  Rounds completed with Ya Starks MD and Gen surgery MD.

## 2021-12-07 NOTE — PROGRESS NOTES
No orders given for bear hugger. Warm blankets placed on patient to help regulate temperature. Will monitor.

## 2021-12-07 NOTE — PROGRESS NOTES
Image guided temporary dialysis catheter completed. Dr. Gabe Tran placed a 13 15 cm power-trilaysis short term straight dilaysis catheter LOT [id number] EXP 3/31/2023 in the right IJ . Line aspirates and flushes with ease. Dialysis catheter secured with sutures. Surgical site dressing clean, dry, and intact. Each dialysis access lumen heparin locked with 1.2 ml of IV heparin each. (IV access pigtail NS locked). Pt tolerated procedure without any signs or symptoms of distress. Vital signs stable. Report given to RN in IR . Pt transported back to 0204 in stable condition via bed by transport and RN. Line ok to use per Alyse.     Vital Signs  Vitals:    12/07/21 1143   BP: (!) 109/52   Pulse: 80   Resp: 20   Temp:    SpO2: 100%        Post Constantine Score  2 - Able to move 4 extremities voluntarily on command  2 - BP+/- 20mmHg of normal  2 - Able to maintain oxygen saturation >92% on room air  2 - Able to breathe deeply and cough freely  2 - Fully awake

## 2021-12-07 NOTE — PROGRESS NOTES
[facility] SURGERY    PATIENT NAME: Saad Post     TODAY'S DATE: 12/7/2021    CHIEF COMPLAINT: unable to obtain    INTERVAL HISTORY/HPI:    Pt somnolent, in restraints. Events noted, pt with declining status. REVIEW OF SYSTEMS:  Pertinent positives and negatives as per interval history section    OBJECTIVE:  VITALS:  /65   Pulse 75   Temp 97.5 °F (36.4 °C) (Axillary)   Resp 18   Ht 6' 2\" (1.88 m)   Wt 262 lb 6.4 oz (119 kg)   SpO2 97%   BMI 33.69 kg/m²     INTAKE/OUTPUT:    I/O last 3 completed shifts: In: 5030.1 [I.V.:3814.6; IV Piggyback:1215.6]  Out: 510 [Urine:435; Drains:75]  I/O this shift: In: 48.8 [I.V.:43.3; IV Piggyback:5.5]  Out: -     CONSTITUTIONAL:   somnolent  LUNGS:  Respirations easy and unlabored, no crackles  CARD:  regular rate and rhythm  ABDOMEN:   soft, non-distended, does appear to be tender to palpation, perc drain in place with cloudy/thick bile     Data:  CBC: Recent Labs     12/05/21  0857 12/06/21  0923 12/07/21  0621   WBC 31.8* 36.0* 36.7*   HGB 12.9* 12.6* 12.0*   HCT 39.4* 38.0* 37.5*    286 268     BMP:    Recent Labs     12/05/21  1841 12/06/21  0923 12/07/21  0621   * 136 136   K 4.0 3.9 4.0    102 101   CO2 18* 19* 19*   * 109* 115*   CREATININE 3.5* 3.6* 4.3*   GLUCOSE 109* 88 80     Hepatic:   Recent Labs     12/05/21  1000 12/06/21  0535 12/07/21  0807   AST 26 28 28   ALT 23 22 19   BILITOT 2.0* 1.9* 1.8*   ALKPHOS 313* 292* 253*     Mag:    No results for input(s): MG in the last 72 hours. Phos:     Recent Labs     12/05/21  0857 12/07/21  0621   PHOS 5.9* 8.4*      INR:   Recent Labs     12/05/21  1000   INR 1.45*       Radiology Review:  *Imaging personally reviewed by me.    PROCEDURE:   CT GUIDED NDL PLACEMENT; DRAINAGE GUIDANCE US; PERCUTANEOUS CHOLECYSTOSTOMY     MODERATE CONSCIOUS SEDATION     12/6/2021     HISTORY:   ORDERING SYSTEM PROVIDED HISTORY: gb drainage   TECHNOLOGIST PROVIDED HISTORY:   Reason for exam:->gb drainage     TECHNIQUE:   Dose modulation, iterative reconstruction, and/or weight based adjustment of   the mA/kV was utilized to reduce the radiation dose to as low as reasonably   achievable.  ;  Dose modulation, iterative reconstruction, and/or weight   based adjustment of the mA/kV was utilized to reduce the radiation dose to as   low as reasonably achievable. CONTRAST:   Less than 10 cc ProHance within the gallbladder. SEDATION:   0.5 mgversed and 25 mcg fentanyl were titrated intravenously for moderate   sedation monitored under my direction.  Total intraservice time of sedation   was 35 minutes.  The patient's vital signs were monitored throughout the   procedure and recorded in the patient's medical record by the nurse. Estimated blood loss: Less than 5 mL. FLUOROSCOPY DOSE AND TYPE OR TIME AND EXPOSURES:   DLP: 1861.02 mGy cm     DESCRIPTION OF PROCEDURE:   Informed consent was obtained after a detailed explanation of the procedure   including risks, benefits, and alternatives.  Universal protocol was   observed.  The patient was prepped and draped using standard aseptic   technique.  And the gallbladder was localized with ultrasound.  The expected   needle tract was anesthetized using 1% subcutaneous lidocaine.  Next, under   continuous ultrasound guidance, a 5 [place] Yueh catheter was carefully   advanced into the gallbladder.  There was return of thin bilious fluid. Next, an Amplatz wire was advanced through the [place] catheter.  Spot image was   obtained, which demonstrated the wire coiling within the gallbladder lumen. Next, catheter track was sequentially dilated to accommodate an 8 [place]il[place]   drain.  Pigtail was formed at the neck of the gallbladder.  No significant   aspirate was obtained and so a small amount of contrast was instilled, which   demonstrated pooling within the gallbladder lumen and non dependent air.    Additional attempts were made to aspirate the gallbladder contents, which   were unsuccessful.  It was then decided to upsized to a 10 [place] Emiliana drain over   the wire.  Repeat image demonstrate pigtail forming within the region of the   gallbladder fundus.  A small amount of aspirate was obtained.  The patient   tolerated the procedure well and there were no immediate complications. EBL: Less than 5 cc     FINDINGS:   10 [place] citizen pigtail within the gallbladder lumen. Impression:     Technically successful CT and ultrasound-guided 10 [place] citizen percutaneous   cholecystostomy tube placement. ASSESSMENT AND PLAN:  Cholecystitis s/p cholecystostomy tube placement    Dicussed at length with internal medicine. Pt being transferred to higher level of care. Continue with perc drain open, continue with IV antibiotics. Continue with supportive care. Electronically signed by Ryann Harris APRN - [facility]    Surgery Staff    I have examined this patient and read and agree with the note by Rynan Harris CNP from today. Pt remains acutely ill despite biliary decompression. At this point patient in tenuous from a hemodynamic standpoint, primary team is determining the appropriate level of care at which he should be located. For now, continued non-operative management of his cholecystitis seems appropriate. At his current level of instability he would not be a good surgical candidate. Will follow closely with you.     Jerson Rodriguez MD

## 2021-12-07 NOTE — PROGRESS NOTES
Page sent to Taylor Blanton MD with Yanely Round of pt's status this morning. Requesting pt be seen early this morning for rounds.

## 2021-12-07 NOTE — PROGRESS NOTES
Page sent to [facility] YVETTE GUALLPA MD with Nephrology: \"FYI pt's  Cr 4.3. Pt only had 100 mLs of urine out overnight. \"

## 2021-12-07 NOTE — PROGRESS NOTES
Pt arrived for image guided temporary dialysis catheter insertion right IJ . Procedure explained including the risk and benefits of the procedure. All questions answered reviewed with daughter Lupe Mayo, who gives verbal consent to two nurses and the physician. She verbalizes understanding of the procedure and states no more questions. Consent confirmed. Vital signs stable. Labs, allergies, medications, and code status reviewed. No contraindications noted.      Vital Signs  Vitals:    12/07/21 1136   BP: 109/65   Pulse: 78   Resp: 25   Temp:    SpO2: 100%    (vital signs in table format)    Pre Constantine Score  2 - Able to move 4 extremities voluntarily on command  2 - BP+/- 20mmHg of normal  2 - Fully awake  2 - Able to maintain oxygen saturation >92% on room air  2 - Able to breathe deeply and cough freely    Allergies  Dye [iodides] (allergies)    Labs  Lab Results   Component Value Date    INR 1.45 (H) 12/05/2021    PROTIME 16.7 (H) 12/05/2021     Lab Results   Component Value Date    CREATININE 4.3 (H) 12/07/2021     (HH) 12/07/2021     12/07/2021    K 4.0 12/07/2021     12/07/2021    CO2 19 (L) 12/07/2021     Lab Results   Component Value Date    WBC 36.7 (H) 12/07/2021    HGB 12.0 (L) 12/07/2021    HCT 37.5 (L) 12/07/2021    .6 (H) 12/07/2021     12/07/2021

## 2021-12-07 NOTE — PROGRESS NOTES
Pt red, sweaty, and hot to touch during change of shift. Rectal temp 96.1. Heated blanket turned off at this time. Pt appears extremely confused. Bilateral soft wrist restraints remain in place.

## 2021-12-07 NOTE — CONSULTS
Consult placed    Who: IR  Date:12/7/2021,  Time:10:35 AM        Electronically signed by Rashida Inman on 12/7/2021 at 10:35 AM

## 2021-12-07 NOTE — PROGRESS NOTES
Intensive Care Progress Note    Patient: Livan Prajapati MRN: [id number]  Date of  Admission: 12/4/2021   YOB: 1945  Age: 78 y.o. Sex: male    Unit: [facility] C2 CVU  Room/Bed: 0234/0234-01 Attending Physician: Cindy Colin MD   Admitting Physician: Elmer Mike          Chief Complaint/Referring Provider:  Patient is being seen at the request of Dr. Hilary Meneses  for a consultation for septic shock      Presenting HPI: Patient was brought to the hospital with generalized weakness/shortness of breath        As per  ER provider-Jose Staley is a 78 y. o. male w/ hx of COPD on 4L NC as needed, HTN, HLD, afib, and neuroendocrine pancreatic cancer who presents with shortness of breath and fatigue.  Reports a few days before Thanksgiving he began feeling tired, feverish, and short of breath.  He took some Tylenol which did help with the fever.  Since Thanksgiving he has continued to feel weak, rundown, nauseous, SOB, had loss of appetite and diarrhea.  He feels the weakness is worse in his legs.  He denies any vomiting but there is evidence of dark brown emesis in oral cavity, on chest, and on the front of his shirt.   ED course -This is a 79-year-old male who presents with shortness of breath, nausea, and weakness.  Upon arrival, he is ill-appearing, pale, and hypotensive.  On exam he he has dry mucous membranes with dried black spots on his tongue, neck, and chest.  Right lower quadrant was tender to palpation, he has generalized weakness, and diminished reflexes.  Patient was started on fluids for hypotension. CT abdomen was ordered, pending.  CXR showed mild right basilar atelectasis and so with hx of COPD patient was given Solu-Medrol. Troponin, lipase, and lactate were unremarkable. UA and occult stool collected, pending.  WBC was 21.2, H/H 13.0/38.6, .0, BUN 91, creatinine 3.6, GFR 17 total bilirubin 2.3, and alk phos 311.0.   To the ICU for mother management for septic shock for Patient Vitals for the past 24 hrs:   BP Temp Temp src Pulse Resp SpO2 Height Weight   12/07/21 1545    64 12 99 %     12/07/21 1439    56 20  6' 2\" (1.88 m)    12/07/21 1408 (!) 74/41          12/07/21 1405    71       12/07/21 1405 111/61          12/07/21 1403    77       12/07/21 1403 125/69          12/07/21 1225 (!) 104/58 97.5 °F (36.4 °C)  81 16   261 lb 9.2 oz (118.6 kg)   12/07/21 1207 105/65 97.5 °F (36.4 °C) Axillary 75 18 97 %     12/07/21 1150 107/75   80 14 100 %     12/07/21 1143 (!) 109/52   80 20 100 %     12/07/21 1136 109/65   78 25 100 %     12/07/21 1130 112/71   79 20 100 %     12/07/21 1126 111/64   78 17 99 %     12/07/21 1106 104/63 97.4 °F (36.3 °C) Axillary 76 20 92 %     12/07/21 0937 105/60 97.4 °F (36.3 °C) Axillary 75 22 98 %     12/07/21 0748 109/64 97.6 °F (36.4 °C) Axillary 81 21 97 %     12/07/21 0723  96.1 °F (35.6 °C) Rectal        12/07/21 0446 107/65 96.2 °F (35.7 °C) Rectal 89 20 95 %  262 lb 6.4 oz (119 kg)   12/07/21 0023 110/75 95.4 °F (35.2 °C) Rectal 84 18 96 %     12/06/21 2213 119/80   93  92 %     12/06/21 2028 126/72 94.1 °F (34.5 °C) Rectal 86 16 96 %     12/06/21 1900      99 %     12/06/21 1845 118/82   79 16      12/06/21 1700 111/75 93.8 °F (34.3 °C) Rectal 83 16 97 %     12/06/21 1647    87 18 93 %     12/06/21 1644 109/75   86 15 95 %     12/06/21 1637 107/63   85 15 98 %     12/06/21 1633 109/64   86 17 99 %     12/06/21 1629 107/60   83 12 98 %     12/06/21 1626 107/64   84 14 92 %     12/06/21 1622 (!) 109/58   84 13 94 %     12/06/21 1618 108/60   85 13 96 %     12/06/21 1617 107/63   85 12 98 %     12/06/21 1614 104/60   85 10 96 %     12/06/21 1605 (!) 143/56   85 20 94 %     12/06/21 1604 (!) 143/86   86 18 98 %              Physical Exam  Vitals and nursing note reviewed.    Constitutional: CLINICAL HISTORY:     ORDERING SYSTEM PROVIDED  NG placement  TECHNOLOGIST PROVIDED HISTORY:   Reason for exam:->NG placement  Reason for Exam: NG placement  Acuity: Acute   Type of Exam: Initial     TECHNIQUE:     Frontal supine view of the abdomen/pelvis. COMPARISON:     No relevant prior studies available. FINDINGS:     Gastrointestinal tract:  No acute findings.  No dilation. Bones/joints:  Degenerative changes of the spine. Tubes, lines and devices:  NG tube in place with the tip in the fundus of the   stomach and the side port at or just distal to the GE junction.      Impression:         1.  NG tube in place with the tip in the fundus of the stomach and the side   port at or just distal to the GE junction. 2. Teena Cruel can be seen of the bowel gas pattern appears nonspecific. Other imaging:  XR ABDOMEN (KUB) (SINGLE AP VIEW) [[id number]] Collected: 12/07/21 1457      Order Status: Completed Updated: 12/07/21 1501     Narrative:         EXAM:     XR Abdomen, 1 View     EXAM DATE/TIME:     12/7/2021 2:46 pm     CLINICAL HISTORY:     ORDERING SYSTEM PROVIDED  NG placement  TECHNOLOGIST PROVIDED HISTORY:   Reason for exam:->NG placement  Reason for Exam: NG placement  Acuity: Acute   Type of Exam: Initial     TECHNIQUE:     Frontal supine view of the abdomen/pelvis. COMPARISON:     No relevant prior studies available. FINDINGS:     Gastrointestinal tract:  No acute findings.  No dilation. Bones/joints:  Degenerative changes of the spine. Tubes, lines and devices:  NG tube in place with the tip in the fundus of the   stomach and the side port at or just distal to the GE junction.      Impression:         1.  NG tube in place with the tip in the fundus of the stomach and the side   port at or just distal to the GE junction. 2. Teena Cruel can be seen of the bowel gas pattern appears nonspecific.               Micro: Negative growth to date    Assessment:     Active the patient's condition. Total attending physician time, excluding unbundled procedures, spent at the bedside, and away from the bedside but on the unit or floor, reviewing laboratory test results, discussing care with medical staff, and discussing care with family when the patient was unable or incompetent to participate:   Critical Care Time (Minutes) # 35   (Discontinuous)           Josemanuel Rainey MD  [facility]  Pulmonary, Critical Care and Sleep Medicine  Office : [phone]    Please note that some or all of this record was generated using voice recognition software. If there are any questions about the content of this document, please contact the author as some errors in transcription may have occurred.

## 2021-12-07 NOTE — PROGRESS NOTES
Pt returned from [facility]. Line okay to use per Specials. [facility] notified of pt return. VSS. Blood glucose stable.

## 2021-12-07 NOTE — PROGRESS NOTES
Spoke with Chris Wilkins pt's daughter who gave phone consent on dialysis catheter placement, Mary RN witnessed phone consent and verified with Chris Wilkins that she was agreeable to placement. Chris Wilkins is pt's POA due to pt's wife having dementia. RN asked Chris Wilkins to bring in paperwork for POA and she stated she would look for it at home and bring it in when able.

## 2021-12-07 NOTE — PROGRESS NOTES
[facility] Progress Note           Subjective/   78y.o. year old male who we are seeing in consultation for REJI. HPI:  Renal function slowly worsening, to improve, urine output of 435 ml over last 24 hours. Bp's better, off pressors, remains on lower end. ROS:  Mentation reduced, intake reduced, no fevers. Objective/   GEN:  Chronically ill, /60   Pulse 75   Temp 97.4 °F (36.3 °C) (Axillary)   Resp 22   Ht 6' 2\" (1.88 m)   Wt 262 lb 6.4 oz (119 kg)   SpO2 98%   BMI 33.69 kg/m²   HEENT: non-icteric, no JVD  CV: S1, S2 without m/r/g; + LE edema  RESP: CTA B without w/r/r; breathing wnl  ABD: +bs, soft, nt, no hsm  SKIN: warm, no rashes    Data/  Recent Labs     12/05/21  0857 12/06/21  0923 12/07/21  0621   WBC 31.8* 36.0* 36.7*   HGB 12.9* 12.6* 12.0*   HCT 39.4* 38.0* 37.5*   .4* 111.9* 113.6*    286 268     Recent Labs     12/05/21  0857 12/05/21  0857 12/05/21  1841 12/06/21  0923 12/07/21  0621   *   < > 134* 136 136   K 4.1   < > 4.0 3.9 4.0      < > 102 102 101   CO2 18*   < > 18* 19* 19*   GLUCOSE 126*   < > 109* 88 80   PHOS 5.9*  --   --   --  8.4*   BUN 97*   < > 101* 109* 115*   CREATININE 3.6*   < > 3.5* 3.6* 4.3*   LABGLOM 17*   < > 17* 17* 14*   GFRAA 20*   < > 21* 20* 16*    < > = values in this interval not displayed. Assessment/     -REJI in the setting of septic shock from acute cholecystitis and use of motrin.     SCr up to mid 4 range   UA bland and no obstruction noted     -Septic shock              Likely from acute cholecystitis    -Acute metabolic encephalopathy    -Hyponatremia     -Metabolic acidosis     -Bilateral renal cortical masses              Will need MRI at a later date    Plan/     - Would benefit from dialysis - plan for today/tomorrow  - Radiology consult for vascath placement  - Continue IVF's with bicarb replacement  - Monitor vancomycin levels with dosing  - Trend labs, bp's, & urine output    Case d/w daughter, Juan Ko    ____________________________________  Kayleen Downing MD  [facility]  [email or web address]  Office: [phone]

## 2021-12-07 NOTE — BRIEF OP NOTE
Brief Postoperative Note    Tammy Heath  YOB: 1945  [id number]    Pre-operative Diagnosis: REJI    Post-operative Diagnosis: Same    Procedure: Insertion of nontunneled CVC    Anesthesia: Local    Surgeons/Assistants: Jacqueline Gómez MD    Estimated Blood Loss: less than 5 mL    Complications: None    Specimens: Was Not Obtained    Findings: Vascath successfully placed in right internal jugular vein  Tip of vascath visualized in right atrium. Aspirated, Flushed, and HD lumens Locked with heparin.    OK to Use    Electronically signed by Jacqueline Gómez MD on 12/7/2021 at 11:51 AM

## 2021-12-07 NOTE — PROGRESS NOTES
pt diaphortic     now not responding to pain   rapid respnse called and  called for chem stick  bs84  per hospital staff.    please see rapid response notes   pt intubated  for poor respitory effort  and taken to ICU   family notified per hospital staff  Dr Lorene Delaney here by end of rapid bp 79/40 after sedation for intubation  treatment tirminated when rapid response called      1 hour and 20 mintes of hd line worked well   No fluid removal

## 2021-12-07 NOTE — PROGRESS NOTES
Rapid response called on pt by HD RN. This RN responded to rapid. Pt agonal breathing. Blood glucose checked- 84. Joy Renteria MD arrived at bedside. Pulmonology called to rapid response to intubate pt. Clover Felty MD arrived to bedside at HD. Pt intubated and transfer to ICU room 234 with all belongings. Pt's daughter Noelle Simms updated via phone by Joy Renteria MD. [facility] aware of pt transfer and d/c of tele box. Report given to ICU RN Clarissa Ricardo.

## 2021-12-08 NOTE — PROGRESS NOTES
[facility] Progress Note           Subjective/   78y.o. year old male who we are seeing in consultation for REJI requiring HD. HPI:  Renal function slowly worsening, started HD on 12/7 c/b respiratory arrest, now on ventilator. Transitioned to CRRT on 12/7. Seen on CRRT. Orders confirmed. Hypotensive, remains on pressors. ROS:  Mentation reduced, intake reduced, no fevers. Objective/   GEN:  Chronically ill, /63   Pulse 52   Temp 98.5 °F (36.9 °C) (Bladder)   Resp 22   Ht 6' 2\" (1.88 m)   Wt 261 lb 9.2 oz (118.6 kg)   SpO2 100%   BMI 33.58 kg/m²   HEENT: non-icteric, no JVD  CV: S1, S2 without m/r/g; no LE edema  RESP: CTA B without w/r/r; mechanically ventilated  ABD: +bs, soft, nt, no hsm  SKIN: warm, no rashes  ACCESS: R IJ vascath    Data/  Recent Labs     12/06/21  0923 12/07/21  0621 12/08/21  0540   WBC 36.0* 36.7* 47.0*   HGB 12.6* 12.0* 12.5*   HCT 38.0* 37.5* 37.6*   .9* 113.6* 108.8*    268 309     Recent Labs     12/07/21  1718 12/07/21  2256 12/08/21  0540   * 135* 134*   K 3.9 3.6 3.6   CL 98* 100 101   CO2 20* 21 20*   GLUCOSE 100* 110* 96   PHOS 6.2* 4.8 4.0   MG 1.60* 2.00 2.00   BUN 96* 96* 86*   CREATININE 3.7* 3.4* 3.3*   LABGLOM 16* 18* 18*   GFRAA 19* 21* 22*       Assessment/     -REJI in the setting of septic shock from acute cholecystitis and use of motrin.     SCr up to mid 4 range   UA bland and no obstruction noted   Started HD, CRRT since 12/7     -Septic shock              Likely from acute cholecystitis    -Acute metabolic encephalopathy    - Acute hypoxic, hypercarbic respiratory failure - on ventilator    -Hyponatremia     -Metabolic acidosis     -Bilateral renal cortical masses              Will need MRI at a later date    Plan/     - Continue CRRT with even fluid balance  - Continue pressors to maintain MAP of 65  - Trend labs, bp's, & urine output    Case d/w ICU team    ____________________________________  Romario Mustafa MD  [facility]  [email or web address]  Office: [phone]

## 2021-12-08 NOTE — PROGRESS NOTES
Intensive Care Progress Note    Patient: Jaden Rudd MRN: [id number]  Date of  Admission: 12/4/2021   YOB: 1945  Age: 78 y.o. Sex: male    Unit: [address] [address] CVU  Room/Bed: 0234/0234-01 Attending Physician: Roosevelt Schlatter, MD   Admitting Physician: Juli Hummel          Chief Complaint/Referring Provider:  Patient is being seen at the request of Dr. Pallavi Bourgeois  for a consultation for septic shock      Presenting HPI: Patient was brought to the hospital with generalized weakness/shortness of breath        As per  ER provider-Jose Staley is a 78 y. o. male w/ hx of COPD on 4L NC as needed, HTN, HLD, afib, and neuroendocrine pancreatic cancer who presents with shortness of breath and fatigue.  Reports a few days before Thanksgiving he began feeling tired, feverish, and short of breath.  He took some Tylenol which did help with the fever.  Since Thanksgiving he has continued to feel weak, rundown, nauseous, SOB, had loss of appetite and diarrhea.  He feels the weakness is worse in his legs.  He denies any vomiting but there is evidence of dark brown emesis in oral cavity, on chest, and on the front of his shirt.   ED course -This is a 82-year-old male who presents with shortness of breath, nausea, and weakness.  Upon arrival, he is ill-appearing, pale, and hypotensive.  On exam he he has dry mucous membranes with dried black spots on his tongue, neck, and chest.  Right lower quadrant was tender to palpation, he has generalized weakness, and diminished reflexes.  Patient was started on fluids for hypotension. CT abdomen was ordered, pending.  CXR showed mild right basilar atelectasis and so with hx of COPD patient was given Solu-Medrol. Troponin, lipase, and lactate were unremarkable. UA and occult stool collected, pending.  WBC was 21.2, H/H 13.0/38.6, .0, BUN 91, creatinine 3.6, GFR 17 total bilirubin 2.3, and alk phos 311.0.   To the ICU for mother management for septic shock for further management  Patient when seen this morning continues to be critically ill, patient was on IV Levophed infusion to maintain hemodynamics, patient's norepinephrine infusion requirement had gone up overnight, patient was afebrile, patient on questioning further does not have any significant profound confusion which he had last night as per nursing, patient complaining of generalized body aches and chronic pain, patient also is complaining of right abdominal pain, patient also has been having diarrhea and constipation as per the patient, patient does not have any significant expectoration, patient does not have any significant epistaxis or hemoptysis or any hematochezia or melena, patient does not have any increasing leg edema, patient has had some shortness of breath, patient on questioning further apparently has chronic respiratory failure requiring 4 L of nasal cannula oxygen at home,  Patient when seen also had some hypothermia overnight, patient was on 2 L of nasal can oxygen with saturation of 96% when evaluated, patient has had no urine output overnight with cumulative fluid balance of +860 mL, patient's p.o. intake has been on the lower side, patient does not give any other pertinent review of system of concern objectivelyb    12/4/2021admit      12/5/2021pulmonary consulted, Levophed started and stopped    12/6/2021off of Levophed no issues with pressures      12/7/2021patient had a rapid response, acute respiratory decompensation during dialysis and patient was intubated and transferred to the ICU          Subjective:   Patient on CRRT and keeping even  Having significant amount of output from the ERICKA drain that is in the gallbladder space  NG tube also having greenish output  Tolerating vent        ROS:Review of systems not obtained due to patient factors. Objective: Intake and Output:   Current Shift:   No intake/output data recorded.   Last three shifts:   12/07 0701 - 12/08 0700  In: 48.8 [I.V.:43.3]  Out: -     Vent settings for last 24 hours:  [unfilled]    Hemodynamic parameters for last 24 hours:  [unfilled]    Physical Exam:   Patient Vitals for the past 24 hrs:   BP Temp Temp src Pulse Resp SpO2 Height Weight   12/08/21 0800 112/63   52 22 100 %     12/08/21 0701 110/63   53 22 100 %     12/08/21 0700    52 22 100 %     12/08/21 0600 108/66   54 22 100 %     12/08/21 0500 124/67   54 22 99 %     12/08/21 0421    56 22 99 %     12/08/21 0400 115/64 99 °F (37.2 °C) Bladder 58 22 100 %     12/08/21 0300 (!) 107/59   58 22 96 %     12/08/21 0200 112/65   57 22 100 %     12/08/21 0100 123/68   60 22 100 %     12/08/21 0013    67 22 98 %     12/08/21 0000 126/69 98.5 °F (36.9 °C) Bladder 63 22 100 %     12/07/21 2300 114/65   64 22 99 %     12/07/21 2200 112/65   59 22 99 %     12/07/21 2101    58 22 99 %     12/07/21 2100 115/65   57 22 99 %     12/07/21 2002 113/69 97.4 °F (36.3 °C) Bladder 58 22 100 %     12/07/21 1900 113/69   59 22 100 %     12/07/21 1800 110/65   60 22 100 %     12/07/21 1700 111/69 95.7 °F (35.4 °C) Bladder 58 22 100 %     12/07/21 1600 117/79   59 20 100 %     12/07/21 1545    64 12 99 %     12/07/21 1500 (!) 95/57 94.3 °F (34.6 °C) Bladder 57 22 100 %     12/07/21 1439    56 20  6' 2\" (1.88 m)    12/07/21 1408 (!) 74/41          12/07/21 1405    71       12/07/21 1405 111/61  Angelica Ahle       12/07/21 1403    77       12/07/21 1403 125/69          12/07/21 1225 (!) 104/58 97.5 °F (36.4 °C)  81 16   261 lb 9.2 oz (118.6 kg)   12/07/21 1207 105/65 97.5 °F (36.4 °C) Axillary 75 18 97 %     12/07/21 1150 107/75   80 14 100 %     12/07/21 1143 (!) 109/52   80 20 100 %     12/07/21 1136 109/65   78 25 100 %     12/07/21 1130 112/71   79 20 100 %     12/07/21 1126 111/64   78 17 99 %     12/07/21 1106 104/63 97.4 °F (36.3 °C) Axillary 76 20 92 %     12/07/21 0937 105/60 97.4 °F (36.3 °C) Axillary 75 22 98 %              Physical Exam  Vitals and nursing note reviewed. Constitutional:       General: He is not in acute distress. Appearance: Normal appearance. He is obese. He is ill-appearing. Comments: Intubated sedated   HENT:      Head: Normocephalic and atraumatic. Right Ear: External ear normal.      Left Ear: External ear normal.      Nose: Nose normal.      Mouth/Throat:      Comments: Intubated  Eyes:      General:         Right eye: No discharge. Left eye: No discharge. Extraocular Movements: Extraocular movements intact. Conjunctiva/sclera: Conjunctivae normal.      Pupils: Pupils are equal, round, and reactive to light. Cardiovascular:      Rate and Rhythm: Normal rate and regular rhythm. Pulses: Normal pulses. Heart sounds: No murmur heard. Pulmonary:      Effort: No respiratory distress. Breath sounds: No wheezing or rales. Comments: Decreased breath sounds at the bases  Abdominal:      General: There is distension. Comments: Hypoactive bowel sounds  ERICKA drain in place   Musculoskeletal:         General: No swelling. Normal range of motion. Cervical back: Normal range of motion. No rigidity. Skin:     General: Skin is warm and dry. Coloration: Skin is not jaundiced or pale. Neurological:      Comments: Sedated   Psychiatric:      Comments: Agitated but when talked to patient will calm              Labs:   Recent Labs     12/06/21  0923 12/07/21  0621 12/08/21  0540   WBC 36.0* 36.7* 47.0*   HGB 12.6* 12.0* 12.5*   HCT 38.0* 37.5* 37.6*    268 309      Recent Labs     12/07/21  1718 12/07/21  2256 12/08/21  0540   * 135* 134*   K 3.9 3.6 3.6   CL 98* 100 101   CO2 20* 21 20*   BUN 96* 96* 86*   GLUCOSE 100* 110* 96       Additional labs:    Radiology, images personally reviewed.    XR ABDOMEN (KUB) (SINGLE AP VIEW) [[id number]] Collected: 12/07/21 1457      Order Status: Completed Updated: 12/07/21 1501     Narrative:         EXAM:     XR Abdomen, 1 View     EXAM DATE/TIME:     12/7/2021 2:46 pm     CLINICAL HISTORY:     ORDERING SYSTEM PROVIDED  NG placement  TECHNOLOGIST PROVIDED HISTORY:   Reason for exam:->NG placement  Reason for Exam: NG placement  Acuity: Acute   Type of Exam: Initial     TECHNIQUE:     Frontal supine view of the abdomen/pelvis. COMPARISON:     No relevant prior studies available. FINDINGS:     Gastrointestinal tract:  No acute findings.  No dilation. Bones/joints:  Degenerative changes of the spine. Tubes, lines and devices:  NG tube in place with the tip in the fundus of the   stomach and the side port at or just distal to the GE junction.      Impression:         1.  NG tube in place with the tip in the fundus of the stomach and the side   port at or just distal to the GE junction. 2. Stephanie Pates can be seen of the bowel gas pattern appears nonspecific. Other imaging:  XR ABDOMEN (KUB) (SINGLE AP VIEW) [[id number]] Collected: 12/07/21 1457      Order Status: Completed Updated: 12/07/21 1501     Narrative:         EXAM:     XR Abdomen, 1 View     EXAM DATE/TIME:     12/7/2021 2:46 pm     CLINICAL HISTORY:     ORDERING SYSTEM PROVIDED  NG placement  TECHNOLOGIST PROVIDED HISTORY:   Reason for exam:->NG placement  Reason for Exam: NG placement  Acuity: Acute   Type of Exam: Initial     TECHNIQUE:     Frontal supine view of the abdomen/pelvis. COMPARISON:     No relevant prior studies available. FINDINGS:     Gastrointestinal tract:  No acute findings.  No dilation. Bones/joints:  Degenerative changes of the spine.      Tubes, lines and devices:  NG tube in place with the tip in the fundus of the   stomach and the side port at or just distal to the GE junction.      Impression:         1.  NG tube in place with the tip in the fundus of the stomach and the side   port at or just distal to the GE junction. 2. Ray Encinas can be seen of the bowel gas pattern appears nonspecific. Micro: Negative growth to date    Assessment:     Active Problems:    Shock, septic ([address]    Cholecystitis    REJI (acute kidney injury) ([address]    Pulmonary infiltrates    Normal anion gap metabolic acidosis    Leukocytosis    Elevated alkaline phosphatase level    Calculus of gallbladder with acute cholecystitis without obstruction    Splenomegaly    Macrocytosis    Dyspnea  Resolved Problems:    * No resolved hospital problems. *      Discussion / Plan:       Neurology  History of PTSD, depression    Sedated on  Fentanyl  Versed    Patient did have some acute encephalopathy probably secondary to sepsis and septic shock with low mentation status because of this      Cardiovascular  History of atrial fibrillation, hypertension  Continue with  Aspirin 81 mg daily  Lipitor 40 mg nightly      Elevated BNP of 3366, however in the presence of renal failure. We will keep IV fluids at [facility] as the patient probably has significant amounts of fluid on board    Pulmonary  Acute respiratory failure  VC plus, tidal volume 500, rate of 22, FiO2 of 50%, PEEP of 5, TI time of 1.1  ABG this morning shows an O2 sat of 97%, will wean FiO2      Gastrointestinal   Pancreatic cancer    CT abdomen shows cholecystitis  General surgery has been consulted, will need to be seen again  ERICKA drain in place and draining a purulent greenish material  I suspect that the patient is septic b/c of the cholecystitis    Surgery was talked to today and will like to try to hold off on doing surgery at least 1 more day, however because of the increase in white count and respiratory arrest patient may benefit from a cool off. Of at least 24 hours. May consider doing a surgery to take out the gallbladder tomorrow. This carries a high risk in if we wait it is a high risk and if we do not wait it is also high risk.     Liver function is stable, bilirubin is elevated but not increasing. Lipase is normal  Lactic acid is normal    Maxipime  Flagyl  Protonix 40 mg daily    Endo  Blood sugars are controlled  TSH is normal     Renal  Renal insufficiency  Nephrology has been consulted  Creatinine holding at 3.3  Vas-Cath is been placed 12/7/2021  Was trying to do hemodialysis however coded during this  We will start CRRT    Prophylaxis  Heparin 5000 every 8 hours  Protonix 40 mg daily      Lines  [place]    Right IJ Vas-Cath placed 12/7/2021  ET tube placed 12/7/2021    Code Status: Full code          Infectious disease  Septic shock  Off pressors   Cefepime 1000 mg every 12 hours  Flagyl 500 every 8 hours                    Okay to transfer out of the ICU    Critical Care Services Provided: This patient had a critical illness or injury that acutely impaired one or more vital organ systems such that there was a high probability of imminent or life-threatening deterioration in the patient's condition. This required high complexity decision-making to assess, manipulate, and support vital system function(s) to treat single or multiple vital organ system failure and/or to prevent further life-threatening deterioration of the patient's condition. Total attending physician time, excluding unbundled procedures, spent at the bedside, and away from the bedside but on the unit or floor, reviewing laboratory test results, discussing care with medical staff, and discussing care with family when the patient was unable or incompetent to participate:   Critical Care Time (Minutes) # 35   (Discontinuous)           Gallo Orta MD  [facility]  Pulmonary, Critical Care and Sleep Medicine  Office : [phone]    Please note that some or all of this record was generated using voice recognition software. If there are any questions about the content of this document, please contact the author as some errors in transcription may have occurred.

## 2021-12-08 NOTE — PROGRESS NOTES
12/08/21 1633   Vent Information   Vent Type 980   Vent Mode AC/VC+   Vt Ordered 500 mL   Rate Set 22 bmp   Pressure Support 0 cmH20   FiO2  40 %   SpO2 98 %   SpO2/FiO2 ratio 245   Sensitivity 3   PEEP/CPAP 5   I Time/ I Time % 0.95 s   Humidification Source HME   Vent Patient Data   Peak Inspiratory Pressure 29 cmH2O   Mean Airway Pressure 13 cmH20   Rate Measured 22 br/min   Vt Exhaled 501 mL   Minute Volume 11.4 Liters   I:E Ratio 1:1.90   Spontaneous Breathing Trial (SBT) RT Doc   Pulse (!) 48   Breath Sounds   Right Upper Lobe Diminished   Right Middle Lobe Diminished   Right Lower Lobe Diminished   Left Upper Lobe Diminished   Left Lower Lobe Diminished   Additional Respiratory  Assessments   Resp 22   Position Semi-Caldwell's   Oral Care Mouth suctioned   Alarm Settings   High Pressure Alarm 45 cmH2O   Low Minute Volume Alarm 2 L/min   High Respiratory Rate 40 br/min   ETT (adult)   Placement Date/Time: 12/07/21 1405   Preoxygenation: Yes  Mask Ventilation: Ventilated by mask (1)  Technique: Rapid sequence; Video laryngoscopy  Type: Cuffed  Tube Size: 8 mm  Laryngoscope: GlideScope  Blade Size: 4  Location: Oral  Insertion attemp. ..    Secured at 26 cm   Measured From Lips   ET Placement Left   Secured By Commercial tube saeed   Site Condition Dry   Cuff Pressure 30 cm H2O

## 2021-12-08 NOTE — PLAN OF CARE
Problem: Nutrition  Goal: Optimal nutrition therapy  Outcome: Ongoing  Note: Nutrition Problem #1: Inadequate energy intake  Intervention: Food and/or Nutrient Delivery: Start Tube Feeding  Nutritional Goals:  Tolerate TFs at goal rate this admission w/o GI distress

## 2021-12-08 NOTE — PROGRESS NOTES
Report recieved from [facility] at shift change. Shift assessment completed, medications administered, and patient's VSS. Plan of care discussed with ICU team, nephrology, and general surgery. RN updated the patient's daughter, Annette Morales on plan of care and the clinical status of the patient. Will continue to monitor.

## 2021-12-08 NOTE — PROGRESS NOTES
12/08/21 0421   Vent Information   $Ventilation $Subsequent Day   Skin Assessment Clean, dry, & intact   Vent Type 980   Vent Mode AC/VC+   Vt Ordered 500 mL   Rate Set 22 bmp   Pressure Support 0 cmH20   FiO2  50 %   SpO2 99 %   SpO2/FiO2 ratio 198   Sensitivity 3   PEEP/CPAP 5   I Time/ I Time % 1.1 s   Vent Patient Data   Peak Inspiratory Pressure 23 cmH2O   Mean Airway Pressure 12 cmH20   Rate Measured 22 br/min   Vt Exhaled 518 mL   Minute Volume 11.4 Liters   I:E Ratio 1:1.50   Spontaneous Breathing Trial (SBT) RT Doc   Pulse 56   Breath Sounds   Right Upper Lobe Diminished   Right Middle Lobe Diminished   Right Lower Lobe Diminished   Left Upper Lobe Diminished   Left Lower Lobe Diminished   Additional Respiratory  Assessments   Resp 22   Position Semi-Caldwell's   Alarm Settings   High Pressure Alarm 45 cmH2O   Low Minute Volume Alarm 2 L/min   High Respiratory Rate 40 br/min   Low Exhaled Vt  200 mL   Patient Observation   Observations Ambu @ bedside. ETT moved to right side   ETT (adult)   Placement Date/Time: 12/07/21 1405   Preoxygenation: Yes  Mask Ventilation: Ventilated by mask (1)  Technique: Rapid sequence; Video laryngoscopy  Type: Cuffed  Tube Size: 8 mm  Laryngoscope: GlideScope  Blade Size: 4  Location: Oral  Insertion attemp. ..    Secured at 26 cm   Measured From Lips   ET Placement Right   Secured By Commercial tube saeed   Site Condition Dry

## 2021-12-08 NOTE — PROGRESS NOTES
12/08/21 0901   Vent Information   Vent Type 980   Vent Mode AC/VC+   Vt Ordered 500 mL   Rate Set 22 bmp   Pressure Support 0 cmH20   FiO2  50 %   SpO2 100 %   SpO2/FiO2 ratio 200   Sensitivity 3   PEEP/CPAP 5   I Time/ I Time % 1 s   Humidification Source HME   Vent Patient Data   Peak Inspiratory Pressure 24 cmH2O   Mean Airway Pressure 13 cmH20   Rate Measured 22 br/min   Vt Exhaled 516 mL   Minute Volume 11.2 Liters   I:E Ratio 1:1.50   Cough/Sputum   Sputum How Obtained Suctioned; Endotracheal   Cough Non-productive   Spontaneous Breathing Trial (SBT) RT Doc   Pulse 56   Breath Sounds   Right Upper Lobe Diminished   Right Middle Lobe Diminished   Right Lower Lobe Diminished   Left Upper Lobe Diminished   Left Lower Lobe Diminished   Additional Respiratory  Assessments   Resp 22   Position Semi-Caldwell's   Alarm Settings   High Pressure Alarm 45 cmH2O   Low Minute Volume Alarm 2 L/min   Apnea (secs) 20 secs   High Respiratory Rate 40 br/min   Low Exhaled Vt  200 mL   ETT (adult)   Placement Date/Time: 12/07/21 1405   Preoxygenation: Yes  Mask Ventilation: Ventilated by mask (1)  Technique: Rapid sequence; Video laryngoscopy  Type: Cuffed  Tube Size: 8 mm  Laryngoscope: GlideScope  Blade Size: 4  Location: Oral  Insertion attemp. ..    Secured at 26 cm   Measured From Lips   ET Placement Left   Secured By Commercial tube saeed   Site Condition Dry   Cuff Pressure 30 cm H2O

## 2021-12-08 NOTE — CONSULTS
Comprehensive Nutrition Assessment    Type and Reason for Visit:  Initial, Consult    Nutrition Recommendations/Plan:   1. If medically appropriate, recommend order \"Diet: Adult Tube Feed\". Initiate trophic feeds of Vital HP (peptide based high protein formula) at 10 mL/hr. 2. Recommend 60 mL H20 q 4 hours. Recommend reevaluate IV fluids at this time. Increase flush if Na increases greater than 145 mEq/L.  3. Recommend 2 Bottles Proteinex P2Go daily via syringe. Flush with 30 mL H20 before and after. Proteinex P2Go should not be mixed directly with the tube feeding formula. 4. Monitor closely and correct lytes (K, Phos, Mg) before progressing TF to goal d/t risk of refeeding syndrome (hx extended inadequate nutritional intake). 5. Monitor for tolerance (bowel habits, N/V, cramping, abdominal exam findings: distended, firm, tense, guarded, discomfort). Nutrition Assessment:  79 yo male admitted with septic shock poss 2/2 cholecystitis. Hx of pancreatic cancer, HTN, HLD, and COPD. Rapid called during dialysis, now intubated. FiO2 45% and PEEP 5. On fentanyl and versed. Hypotensive on levo. Pt has ERICKA drain with output. NG now has greenish output. No plans for cholecystectomy today. Consulted for TF ordering and management. Recommend starting trophic feeds if medically appropriate. Will monitor. Malnutrition Assessment:  Malnutrition Status: At risk for malnutrition (Comment)      Estimated Daily Nutrient Needs:  Energy (kcal):  4168-4707 kcal; Weight Used for Energy Requirements:  Current (119 kg)     Protein (g):  172-190 g; Weight Used for Protein Requirements:  Ideal (2.0-2.2 g/kg)        Fluid (ml/day):   ; Method Used for Fluid Requirements:  1 ml/kcal      Nutrition Related Findings:  Distended abdomen. +2 generalized edema. NG. Na 134 mmol/L.       Wounds:  None       Current Nutrition Therapies:    Diet NPO Exceptions are: Ice Chips, Sips of Water with Meds  Current Tube Feeding (TF) Orders:  · Feeding Route: Nasogastric  · Formula: Peptide Based High Protein  · Schedule: Continuous  · Additives/Modulars: Protein  · Goal TF & Flush Orders Provides: Vital HP at goal rate of 65 mL/hr to provide 1300 mL TV, 1300 kcal, 114 g protein, and 1087 mL free water. +2 bottles of proteinex daily to provide additional 52 g protein and 208 kcal. +60 mL free water flush      Anthropometric Measures:  · Height: 6' 2\" (188 cm)  · Current Body Weight: 261 lb (118.4 kg)   · Ideal Body Weight: 190 lbs; % Ideal Body Weight 137.4 %   · BMI: 33.5  · BMI Categories: Obese Class 1 (BMI 30.0-34. 9)       Nutrition Diagnosis:   · Inadequate energy intake related to inadequate protein-energy intake, impaired respiratory function as evidenced by NPO or clear liquid status due to medical condition, intubation, nutrition support - enteral nutrition      Nutrition Interventions:   Food and/or Nutrient Delivery:  Start Tube Feeding  Nutrition Education/Counseling:  No recommendation at this time   Coordination of Nutrition Care:  Continue to monitor while inpatient, Interdisciplinary Rounds    Goals: Tolerate TFs at goal rate this admission w/o GI distress       Nutrition Monitoring and Evaluation:   Behavioral-Environmental Outcomes:  None Identified   Food/Nutrient Intake Outcomes:  Enteral Nutrition Intake/Tolerance  Physical Signs/Symptoms Outcomes:  Biochemical Data, Hemodynamic Status, Nutrition Focused Physical Findings, Weight     Discharge Planning:     Too soon to determine     Electronically signed by Liza Loyd MS, RD, LD on 12/8/21 at 12:53 PM EST    Contact: [phone]

## 2021-12-08 NOTE — PROGRESS NOTES
This note also relates to the following rows which could not be included:  SpO2 - Cannot attach notes to unvalidated device data       12/08/21 1213   Vent Information   Vent Type 980   Vent Mode AC/VC+   Vt Ordered 500 mL   Rate Set 22 bmp   Pressure Support 0 cmH20   FiO2  45 %   Sensitivity 3   PEEP/CPAP 5   I Time/ I Time % 0.95 s   Humidification Source HME   Vent Patient Data   Peak Inspiratory Pressure 27 cmH2O   Mean Airway Pressure 13 cmH20   Rate Measured 22 br/min   Vt Exhaled 516 mL   Minute Volume 11.3 Liters   I:E Ratio 1:1.90   Spontaneous Breathing Trial (SBT) RT Doc   Pulse 50   Breath Sounds   Right Upper Lobe Diminished   Right Middle Lobe Diminished   Right Lower Lobe Diminished   Left Upper Lobe Diminished   Left Lower Lobe Diminished   Additional Respiratory  Assessments   Resp 22   Position Semi-Caldwell's   Alarm Settings   High Pressure Alarm 45 cmH2O   Low Minute Volume Alarm 2 L/min   High Respiratory Rate 40 br/min   Low Exhaled Vt  200 mL   ETT (adult)   Placement Date/Time: 12/07/21 1405   Preoxygenation: Yes  Mask Ventilation: Ventilated by mask (1)  Technique: Rapid sequence; Video laryngoscopy  Type: Cuffed  Tube Size: 8 mm  Laryngoscope: GlideScope  Blade Size: 4  Location: Oral  Insertion attemp. ..    Secured at 26 cm   Measured From Lips   ET Placement Right   Secured By Commercial tube saeed   Site Condition Dry   Cuff Pressure 30 cm H2O

## 2021-12-08 NOTE — PROGRESS NOTES
Filter pressures >350. CRRT stopped. Nephrology paged    Dr Alberts Certain nephrologist returned page at [phone]. Discussed patients reason for admission, recent events including multiple set changes, pertinent lab results including patients platelets. Received orders to add heparin syringe at 500 u/hr.

## 2021-12-08 NOTE — PROGRESS NOTES
Hospitalist Progress Note      PCP: Talon Celestin MD    Date of Admission: 12/4/2021    Chief Complaint: Generalized weakness    Hospital Course:   Lakshmi Varma a 78 y. o. male w/ hx of COPD on 4L NC as needed, HTN, HLD, afib, and neuroendocrine pancreatic cancer who presents with shortness of breath and fatigue.  Reports a few days before Thanksgiving he began feeling tired, feverish, and short of breath.  He took some Tylenol which did help with the fever.  Since Thanksgiving he has continued to feel weak, rundown, nauseous, SOB, had loss of appetite and diarrhea.  He feels the weakness is worse in his legs.  He denies any vomiting but there is evidence of dark brown emesis in oral cavity, on chest, and on the front of his shirt. He  complaint right upper quadrant pain in the ER      Subjective: Intubated and sedated on Ventilator. WBC remains elevated at 47 post drain insertion. Drain patent and draining well.   -Labs: Cr 3.3, HCO3 20 on CRRT. Nephrology plan to maintain even fluid balance. General Surgery watchful waiting for GB and need for potential surgery. PREVIOUS  12/7/21: Addendum: Rapid Response 1:45 PM  Patient while at HD became unresponsive with agonal breathing. HR 70-75, NSR.  /75. Patient obtunded and not responsive to verbal or tactile commands. RR gasp at 5-6. Pulmonary Critical Care Team called to bedside for Emergent intubation. Patient was successfully intubated by NP Tiera Bailey. Plan: Transfer to ICU Floor and CRRT    Bedside rounding with nursing completed. No new medical complaints.        Medications:  Reviewed    Infusion Medications    heparin 5000 units CRRT syringe 2 mL/hr at 12/08/21 0430    IV infusion builder Stopped (12/07/21 1700)    prismaSATE BGK 4/2.5 500 mL/hr at 12/08/21 0610    prismaSATE BGK 4/2.5 250 mL/hr at 12/08/21 0010    prismaSATE BGK 4/2.5 500 mL/hr at 12/08/21 0010    fentaNYL (SUBLIMAZE) infusion 200 mcg/hr (12/08/21 8280)    midazolam 7 mg/hr (12/08/21 0728)    dextrose      norepinephrine 24 mcg/min (12/08/21 0701)    sodium chloride       Scheduled Medications    chlorhexidine  15 mL Mouth/Throat BID    carboxymethylcellulose PF  1 drop Both Eyes 6 times per day    influenza virus vaccine  0.5 mL IntraMUSCular Prior to discharge    aspirin  81 mg Oral Daily    atorvastatin  40 mg Oral Nightly    sodium chloride flush  5-40 mL IntraVENous 2 times per day    heparin (porcine)  5,000 Units SubCUTAneous 3 times per day    cefepime  1,000 mg IntraVENous Q12H    metroNIDAZOLE  500 mg IntraVENous Q8H    pantoprazole  40 mg IntraVENous Daily     PRN Meds: magnesium sulfate, sodium phosphate IVPB **OR** sodium phosphate IVPB **OR** sodium phosphate IVPB **OR** sodium phosphate IVPB, fentanNYL, calcium gluconate **OR** calcium gluconate **OR** calcium gluconate **OR** calcium gluconate, dextrose, glucose, dextrose, glucagon (rDNA), dextrose, sodium chloride flush, sodium chloride, acetaminophen **OR** acetaminophen, ipratropium-albuterol      Intake/Output Summary (Last 24 hours) at 12/8/2021 0752  Last data filed at 12/7/2021 1224  Gross per 24 hour   Intake 0 ml   Output    Net 0 ml       Physical Exam Performed:    /63   Pulse 53   Temp 99 °F (37.2 °C) (Bladder)   Resp 22   Ht 6' 2\" (1.88 m)   Wt 261 lb 9.2 oz (118.6 kg)   SpO2 100%   BMI 33.58 kg/m²     General appearance: Intubated Sedated  HEENT: Pupils equal, round, and reactive to light. Conjunctivae/corneas clear. Neck: Supple, with full range of motion. No jugular venous distention. Trachea midline. Respiratory:  Normal respiratory effort. CTA bilat to anterior fields. Synchronous with vent. Cardiovascular: Regular rate and rhythm with normal S1/S2 without murmurs, rubs or gallops. Abdomen: Soft, non-tender, non-distended with normal bowel sounds. Obese, protuberant. No guarding or rebound.    Drain intact: Draining bilious material.   Musculoskeletal: No clubbing, cyanosis or edema bilaterally. Skin: Skin color, texture, turgor normal.  No rashes or lesions. Neurologic:  Unable to assess: Intubated and sedated. Capillary Refill: Brisk,3 seconds, normal   Peripheral Pulses: +2 palpable, equal bilaterally       Labs:   Recent Labs     12/06/21  0923 12/07/21  0621 12/08/21  0540   WBC 36.0* 36.7* 47.0*   HGB 12.6* 12.0* 12.5*   HCT 38.0* 37.5* 37.6*    268 309     Recent Labs     12/07/21  1718 12/07/21  2256 12/08/21  0540   * 135* 134*   K 3.9 3.6 3.6   CL 98* 100 101   CO2 20* 21 20*   BUN 96* 96* 86*   CREATININE 3.7* 3.4* 3.3*   CALCIUM 7.2* 7.5* 7.5*   PHOS 6.2* 4.8 4.0     Recent Labs     12/06/21  0535 12/07/21  0807 12/08/21  0540   AST 28 28 23   ALT 22 19 16   BILIDIR 1.1* 1.1* 1.0*   BILITOT 1.9* 1.8* 1.8*   ALKPHOS 292* 253* 238*     Recent Labs     12/05/21  1000 12/07/21  1048   INR 1.45* 1.75*     No results for input(s): CKTOTAL, TROPONINI in the last 72 hours. Urinalysis:      Lab Results   Component Value Date    NITRU Negative 12/04/2021    WBCUA 3-5 12/04/2021    BACTERIA Rare 12/04/2021    RBCUA 3-4 12/04/2021    BLOODU Negative 12/04/2021    SPECGRAV >=1.030 12/04/2021    GLUCOSEU Negative 12/04/2021       Radiology:  XR CHEST PORTABLE   Final Result   Relatively stable appearance of the chest.  Perihilar and bibasilar airspace   opacities with small pleural effusions. XR ABDOMEN (KUB) (SINGLE AP VIEW)   Final Result      1. NG tube in place with the tip in the fundus of the stomach and the side   port at or just distal to the GE junction. 2.  What can be seen of the bowel gas pattern appears nonspecific. XR CHEST PORTABLE   Final Result   Linear areas of platelike atelectasis in both lungs. Various support tubes   and catheters are in good position without evidence of complication.          IR NONTUNNELED VASCULAR CATHETER > 5 YEARS   Final Result   Successful ultrasound guided non-tunneled dialysis catheter placement. US ABSCESS DRAINAGE PERITONEAL/RETROPERITONEAL PERC   Final Result   Technically successful CT and ultrasound-guided 10 [place] percutaneous   cholecystostomy tube placement. CT [facility] [place] CHOLECYSTOSTOMY   Final Result   Technically successful CT and ultrasound-guided 10 [place] percutaneous   cholecystostomy tube placement. CT GUIDED NEEDLE PLACEMENT   Final Result   Technically successful CT and ultrasound-guided 10 [place] percutaneous   cholecystostomy tube placement. XR CHEST PORTABLE   Final Result   Right IJ central venous catheter with catheter tip cavoatrial junction. Bibasilar atelectasis/pneumonitis. Pulmonary vascular congestion. US GALLBLADDER RUQ   Final Result   Cholelithiasis with a significant amount of echogenic material in the   gallbladder suggesting biliary sludge. The gallbladder is dilated. 6 cm cyst inferiorly in the right kidney         CT ABDOMEN PELVIS WO CONTRAST Additional Contrast? None   Final Result   1. Cholelithiasis with suggestion of gallbladder wall thickening. If patient   has right upper quadrant abdominal pain, ultrasound would better assess   gallbladder. 2. Right lower lobe and left lobe airspace consolidations in the lungs. Please correlate with clinical symptoms of pneumonia. 3. Nodular liver contour and splenomegaly. Please correlate with clinical   history of cirrhosis and portal hypertension. 4. Small volume of ascites in the pelvis and along the mesentery. 5. Sigmoid diverticulosis. No evidence of diverticulitis. 6. Nonobstructing left nephrolithiasis. 7. Bilateral exophytic renal cortical masses, including suspected   hemorrhagic/proteinaceous cyst  arising from the left lower renal pole. Additional hypodensities are not adequately assessed on this noncontrast exam   and would better be assessed on a nonemergent basis with MR per renal mass   protocol. XR CHEST PORTABLE   Final Result   Mild right basilar atelectasis. Assessment/Plan:    Active Hospital Problems    Diagnosis     Dyspnea [R06.00]     REJI (acute kidney injury) ([facility] [address] [N17.9]     Pulmonary infiltrates [R91.8]     Normal anion gap metabolic acidosis [P07.3]     Leukocytosis [D72.829]     Elevated alkaline phosphatase level [R74.8]     Calculus of gallbladder with acute cholecystitis without obstruction [K80.00]     Splenomegaly [R16.1]     Macrocytosis [D75.89]     Cholecystitis [K81.9]     Shock, septic ([address] [A41.9, R65.21]      Septic Shock with worsening leukocytosis  -Secondary to acute cholecystitis. Pulmonary infiltrates seen on CT Abd, CXR wihtout acute ASD. - Transfer to ICU and started on IV Levophed with CRRT. Patient was previously titrated off on 12/5 HS. -Micro: Blood Clx: NGTD, UA bland,   - Abx:  Merrem, Vanco ( DC on 12/7 due to worsening REJI)   - Consult General Surg:  IR to place percutaneous cholecysostomy tube 12/6. Biliary drainage to be collected by IR at T tube insertion. Hypothermia:   -Secondary to sepsis and infection  -Nursing instructed to verify temp rectally  -Joey Tidwell for Temperature stability  -Continue to treat infection as outlined above. Acute kidney injury   -Secondary to hypotension, hypovolemia, infection, and Motrin/Vanco  -Vas cath placed and started on CRRT  - continue with IV hydration   - Labs: check daily BMP  -Avoid unnecessary nephrotoxins. -Judicious use of Vanco and taper with ID Micro culture data  - Consult nephrology: Bilat Renal masses will need MRI FU once pt stable. Metabolic Acidosis:   -Secondary to REJI/poor renal function  -IV Bicarb gtt  -Labs: BMP    Essential Hypertension and HLD without CAD  - Hypotensive/Shock on POA  - Meds:  antihypertensive medication on hold due to hypotension.   Restart Lipitor  -Cont to optimize BP and Cholesterol as outpt     Macrocytic Anemia  -Stable  -Labs: B12, Folate in range  -Iron labs ordered  -Continue to follow    History of a neuroendocrine pancreatic cancer with episodes of hypoglycemia  -Stable  -Patient will need a glucose supply. TF to be initiated. -Cont to FU with outpt Oncologist.     DVT Prophylaxis: Heparin subcu  Diet: Diet NPO Exceptions are: Ice Chips, Sips of Water with Meds  Code Status: Full Code    PT/OT Eval Status: On hold in ICU    Dispo - Expect 2-3 days pending clinical response to medical therapy.      Nguyen Chavez MD

## 2021-12-08 NOTE — PROGRESS NOTES
12/08/21 0013   Vent Information   Skin Assessment Clean, dry, & intact   Vent Type 980   Vent Mode AC/VC+   Vt Ordered 500 mL   Rate Set 22 bmp   Pressure Support 0 cmH20   FiO2  50 %   SpO2 98 %   SpO2/FiO2 ratio 196   Sensitivity 3   PEEP/CPAP 5   I Time/ I Time % 1.1 s   Vent Patient Data   Peak Inspiratory Pressure 26 cmH2O   Mean Airway Pressure 13 cmH20   Rate Measured 22 br/min   Vt Exhaled 531 mL   Minute Volume 10 Liters   I:E Ratio 1:1.50   Spontaneous Breathing Trial (SBT) RT Doc   Pulse 67   Breath Sounds   Right Upper Lobe Diminished   Right Middle Lobe Diminished   Right Lower Lobe Diminished   Left Upper Lobe Diminished   Left Lower Lobe Diminished   Additional Respiratory  Assessments   Resp 22   Position Semi-Caldwell's   Cuff Pressure (cm H2O) 30 cm H2O   Alarm Settings   High Pressure Alarm 45 cmH2O   Low Minute Volume Alarm 2 L/min   High Respiratory Rate 40 br/min   Low Exhaled Vt  200 mL   Patient Observation   Observations Ambu @ bedside. ETT moved to left side   ETT (adult)   Placement Date/Time: 12/07/21 1405   Preoxygenation: Yes  Mask Ventilation: Ventilated by mask (1)  Technique: Rapid sequence; Video laryngoscopy  Type: Cuffed  Tube Size: 8 mm  Laryngoscope: GlideScope  Blade Size: 4  Location: Oral  Insertion attemp. ..    Secured at 26 cm   Measured From Lips   ET Placement Left   Secured By Commercial tube saeed   Site Condition Dry   Cuff Pressure 30 cm H2O

## 2021-12-08 NOTE — PROGRESS NOTES
[facility]    PATIENT NAME: Wilfredo Irby     TODAY'S DATE: 12/8/2021    CHIEF COMPLAINT: abd pain    INTERVAL HISTORY/HPI:    Pt with hypotension and respiratory failure prompting intubation, pressors and ICU transfer. REVIEW OF SYSTEMS:  Pertinent positives and negatives as per interval history section    OBJECTIVE:  VITALS:  BP (!) 110/56   Pulse 53   Temp 98.5 °F (36.9 °C) (Bladder)   Resp 22   Ht 6' 2\" (1.88 m)   Wt 261 lb 9.2 oz (118.6 kg)   SpO2 99%   BMI 33.58 kg/m²     INTAKE/OUTPUT:    I/O last 3 completed shifts: In: 48.8 [I.V.:43.3; IV Piggyback:5.5]  Out: -   No intake/output data recorded. CONSTITUTIONAL: sedate  LUNGS:  Respirations easy and unlabored, ventilated  CARD:  regular rate  ABDOMEN:  hypoactive bowel sounds, soft, distended, tender, perc marylin bilious     Data:  CBC: Recent Labs     12/06/21  0923 12/07/21  0621 12/08/21  0540   WBC 36.0* 36.7* 47.0*   HGB 12.6* 12.0* 12.5*   HCT 38.0* 37.5* 37.6*    268 309     BMP:    Recent Labs     12/07/21 1718 12/07/21 2256 12/08/21  0540   * 135* 134*   K 3.9 3.6 3.6   CL 98* 100 101   CO2 20* 21 20*   BUN 96* 96* 86*   CREATININE 3.7* 3.4* 3.3*   GLUCOSE 100* 110* 96     Hepatic:   Recent Labs     12/06/21  0535 12/07/21  0807 12/08/21  0540   AST 28 28 23   ALT 22 19 16   BILITOT 1.9* 1.8* 1.8*   ALKPHOS 292* 253* 238*     Mag:      Recent Labs     12/07/21 1718 12/07/21 2256 12/08/21  0540   MG 1.60* 2.00 2.00      Phos:     Recent Labs     12/07/21 1718 12/07/21  2256 12/08/21  0540   PHOS 6.2* 4.8 4.0      INR:   Recent Labs     12/07/21  1048   INR 1.75*       Radiology Review:  *Imaging personally reviewed by me. NA      ASSESSMENT AND PLAN:  79 yo with acute cholecystitis  1. Drain still functional - continue to bulb suction  2. Change abx to merrem  3.   If worsens then would consider lap/open cholecystectomy     Electronically signed by Anshu Harrell, [address]

## 2021-12-09 NOTE — PROGRESS NOTES
12/09/21 1154   Vent Information   Vent Type 980   Vent Mode AC/VC+   Vt Ordered 500 mL   Rate Set 22 bmp   Pressure Support 0 cmH20   FiO2  40 %   Sensitivity 3   PEEP/CPAP 5   I Time/ I Time % 0.95 s   Vent Patient Data   Peak Inspiratory Pressure 32 cmH2O   Mean Airway Pressure 14 cmH20   Rate Measured 22 br/min   Vt Exhaled 502 mL   Minute Volume 11.1 Liters   I:E Ratio 1:1.90   Cough/Sputum   Sputum How Obtained Oral; Endotracheal   Cough Non-productive   Sputum Amount None   Spontaneous Breathing Trial (SBT) RT Doc   Pulse (!) 46   Breath Sounds   Right Upper Lobe Diminished   Right Middle Lobe Diminished   Right Lower Lobe Diminished   Left Upper Lobe Diminished   Left Lower Lobe Diminished   Additional Respiratory  Assessments   Resp 22   Oral Care Mouth suctioned   Alarm Settings   High Pressure Alarm 45 cmH2O   Low Minute Volume Alarm 2 L/min   High Respiratory Rate 40 br/min   Low Exhaled Vt  200 mL   ETT (adult)   Placement Date/Time: 12/07/21 1405   Preoxygenation: Yes  Mask Ventilation: Ventilated by mask (1)  Technique: Rapid sequence; Video laryngoscopy  Type: Cuffed  Tube Size: 8 mm  Laryngoscope: GlideScope  Blade Size: 4  Location: Oral  Insertion attemp. ..    Secured at 26 cm   Measured From Lips   ET Placement Left   Secured By Commercial tube saeed   Site Condition Dry

## 2021-12-09 NOTE — PROGRESS NOTES
Intensive Care Progress Note    Patient: Jenna Hutton MRN: [id number]  Date of  Admission: 12/4/2021   YOB: 1945  Age: 78 y.o. Sex: male    Unit: [address] [address] CVU  Room/Bed: 0234/0234-01 Attending Physician: Ankita Marin MD   Admitting Physician: Jacinda Baldwin          Chief Complaint/Referring Provider:  Patient is being seen at the request of Dr. Michael Schulte  for a consultation for septic shock      Presenting HPI: Patient was brought to the hospital with generalized weakness/shortness of breath        As per  ER provider-Jose Staley is a 78 y. o. male w/ hx of COPD on 4L NC as needed, HTN, HLD, afib, and neuroendocrine pancreatic cancer who presents with shortness of breath and fatigue.  Reports a few days before Thanksgiving he began feeling tired, feverish, and short of breath.  He took some Tylenol which did help with the fever.  Since Thanksgiving he has continued to feel weak, rundown, nauseous, SOB, had loss of appetite and diarrhea.  He feels the weakness is worse in his legs.  He denies any vomiting but there is evidence of dark brown emesis in oral cavity, on chest, and on the front of his shirt.   ED course -This is a 24-year-old male who presents with shortness of breath, nausea, and weakness.  Upon arrival, he is ill-appearing, pale, and hypotensive.  On exam he he has dry mucous membranes with dried black spots on his tongue, neck, and chest.  Right lower quadrant was tender to palpation, he has generalized weakness, and diminished reflexes.  Patient was started on fluids for hypotension. CT abdomen was ordered, pending.  CXR showed mild right basilar atelectasis and so with hx of COPD patient was given Solu-Medrol. Troponin, lipase, and lactate were unremarkable. UA and occult stool collected, pending.  WBC was 21.2, H/H 13.0/38.6, .0, BUN 91, creatinine 3.6, GFR 17 total bilirubin 2.3, and alk phos 311.0.   To the ICU for mother management for septic shock for further management  Patient when seen this morning continues to be critically ill, patient was on IV Levophed infusion to maintain hemodynamics, patient's norepinephrine infusion requirement had gone up overnight, patient was afebrile, patient on questioning further does not have any significant profound confusion which he had last night as per nursing, patient complaining of generalized body aches and chronic pain, patient also is complaining of right abdominal pain, patient also has been having diarrhea and constipation as per the patient, patient does not have any significant expectoration, patient does not have any significant epistaxis or hemoptysis or any hematochezia or melena, patient does not have any increasing leg edema, patient has had some shortness of breath, patient on questioning further apparently has chronic respiratory failure requiring 4 L of nasal cannula oxygen at home,  Patient when seen also had some hypothermia overnight, patient was on 2 L of nasal can oxygen with saturation of 96% when evaluated, patient has had no urine output overnight with cumulative fluid balance of +860 mL, patient's p.o. intake has been on the lower side, patient does not give any other pertinent review of system of concern objectivelyb    12/4/2021admit      12/5/2021pulmonary consulted, Levophed started and stopped    12/6/2021off of Levophed no issues with pressures      12/7/2021patient had a rapid response, acute respiratory decompensation during dialysis and patient was intubated and transferred to the ICU    12/8/2021patient on ventilator, pressor agents have been added, surgery is seeing the patient, ERICKA drain draining 50 mL/h      Subjective:   Patient on CRRT and keeping even  Having significant amount of output from the ERICKA drain that is in the gallbladder space  NG tube also having greenish output  Tolerating vent    Surgery has seen the patient  Patient has not had any fevers  Pulse have been low at 46  O2 sats have remained good at 40% and saturating at 100%        ROS:Review of systems not obtained due to patient factors. Objective: Intake and Output:   Current Shift:   No intake/output data recorded.   Last three shifts:   12/08 0701 - 12/09 0700  In: -   Out: 58     Vent settings for last 24 hours:  [unfilled]    Hemodynamic parameters for last 24 hours:  [unfilled]    Physical Exam:   Patient Vitals for the past 24 hrs:   BP Temp Temp src Pulse Resp SpO2 Height Weight   12/09/21 0754    (!) 45 22 99 %     12/09/21 0702 113/62   (!) 47 22      12/09/21 0600 114/65   (!) 47 20      12/09/21 0500 105/65   (!) 48 22      12/09/21 0400 (!) 106/59 98.4 °F (36.9 °C) Bladder 50 22 97 %     12/09/21 0342    54 18 97 %     12/09/21 0334    51 19      12/09/21 0300 120/63   (!) 46 22      12/09/21 0200 124/63   (!) 47 22 99 %     12/09/21 0102 114/67   (!) 46 22 98 %     12/09/21 0000 117/70 98.1 °F (36.7 °C) Bladder (!) 47 22 99 %     12/08/21 2333    (!) 45 22 99 %     12/08/21 2300 124/63   (!) 46 22 99 %     12/08/21 2200 (!) 118/59   (!) 47 22 98 %     12/08/21 2100 117/60   (!) 47 22 100 %     12/08/21 2035    (!) 47 22 100 %     12/08/21 2000 113/65 97.8 °F (36.6 °C) Bladder (!) 48 22 99 %     12/08/21 1900 134/66   (!) 49 22 98 %     12/08/21 1800 116/60   (!) 48 22 99 %     12/08/21 1700 111/63   (!) 48 22 100 %     12/08/21 1633    (!) 48 22 98 %     12/08/21 1600 112/60 97.5 °F (36.4 °C) Bladder (!) 48 22 98 %  271 lb 1.2 oz (123 kg)   12/08/21 1500 118/66   (!) 47 22 100 %     12/08/21 1400 135/67   53 22 99 %     12/08/21 1300 111/63   50 22 99 %     12/08/21 1219       6' 2\" (1.88 m)    12/08/21 1213    50 22 99 %     12/08/21 1200 113/66 97.8 °F (36.6 °C) Bladder 54 22 99 %     12/08/21 1100 110/66   53 22      12/08/21 1000 (!) 110/56   53 22 99 %     12/08/21 0901    56 22 100 %     12/08/21 0900 111/61   64 22               Physical Exam  Vitals and nursing note reviewed. Constitutional:       General: He is not in acute distress. Appearance: Normal appearance. He is obese. He is ill-appearing. Comments: Intubated sedated   HENT:      Head: Normocephalic and atraumatic. Right Ear: External ear normal.      Left Ear: External ear normal.      Nose: Nose normal.      Mouth/Throat:      Comments: Intubated  Eyes:      General:         Right eye: No discharge. Left eye: No discharge. Extraocular Movements: Extraocular movements intact. Conjunctiva/sclera: Conjunctivae normal.      Pupils: Pupils are equal, round, and reactive to light. Cardiovascular:      Rate and Rhythm: Normal rate and regular rhythm. Pulses: Normal pulses. Heart sounds: No murmur heard. Pulmonary:      Effort: No respiratory distress. Breath sounds: No wheezing or rales. Comments: Decreased breath sounds at the bases  Abdominal:      General: There is distension. Comments: Hypoactive bowel sounds  ERICKA drain in place   Musculoskeletal:         General: No swelling. Normal range of motion. Cervical back: Normal range of motion. No rigidity. Skin:     General: Skin is warm and dry. Coloration: Skin is not jaundiced or pale. Neurological:      Comments: Sedated   Psychiatric:      Comments: Agitated but when talked to patient will calm              Labs:   Recent Labs     12/07/21  0621 12/08/21  0540 12/09/21  0557   WBC 36.7* 47.0* 37.1*   HGB 12.0* 12.5* 12.3*   HCT 37.5* 37.6* 36.5*    309 176      Recent Labs     12/08/21  1440 12/08/21  2242 12/09/21  0557   * 133* 133*   K 3.7 4.1 4.0    100 100   CO2 20* 21 21   BUN 75* 68* 59*   GLUCOSE 113* 129* 125*       Additional labs:    Radiology, images personally reviewed.    XR ABDOMEN (KUB) (SINGLE AP VIEW) [[id number]] Collected: 12/07/21 6693      Order Status: Completed Updated: 12/07/21 1501     Narrative:         EXAM:     XR Abdomen, 1 View     EXAM DATE/TIME:     12/7/2021 2:46 pm     CLINICAL HISTORY:     ORDERING SYSTEM PROVIDED  NG placement  TECHNOLOGIST PROVIDED HISTORY:   Reason for exam:->NG placement  Reason for Exam: NG placement  Acuity: Acute   Type of Exam: Initial     TECHNIQUE:     Frontal supine view of the abdomen/pelvis. COMPARISON:     No relevant prior studies available. FINDINGS:     Gastrointestinal tract:  No acute findings.  No dilation. Bones/joints:  Degenerative changes of the spine. Tubes, lines and devices:  NG tube in place with the tip in the fundus of the   stomach and the side port at or just distal to the GE junction.      Impression:         1.  NG tube in place with the tip in the fundus of the stomach and the side   port at or just distal to the GE junction. 2. Alejandrina Scherer can be seen of the bowel gas pattern appears nonspecific. Other imaging:  XR ABDOMEN (KUB) (SINGLE AP VIEW) [[id number]] Collected: 12/07/21 1457      Order Status: Completed Updated: 12/07/21 1501     Narrative:         EXAM:     XR Abdomen, 1 View     EXAM DATE/TIME:     12/7/2021 2:46 pm     CLINICAL HISTORY:     ORDERING SYSTEM PROVIDED  NG placement  TECHNOLOGIST PROVIDED HISTORY:   Reason for exam:->NG placement  Reason for Exam: NG placement  Acuity: Acute   Type of Exam: Initial     TECHNIQUE:     Frontal supine view of the abdomen/pelvis. COMPARISON:     No relevant prior studies available. FINDINGS:     Gastrointestinal tract:  No acute findings.  No dilation. Bones/joints:  Degenerative changes of the spine. Tubes, lines and devices:  NG tube in place with the tip in the fundus of the   stomach and the side port at or just distal to the GE junction.      Impression:         1.  NG tube in place with the tip in the fundus of the stomach and the side   port at or just distal to the GE junction.      2. Ray Encinas can be seen of the bowel gas pattern appears nonspecific. Micro: Negative growth to date    Assessment:     Active Problems:    Shock, septic ([address]    Cholecystitis    REJI (acute kidney injury) ([address]    Pulmonary infiltrates    Normal anion gap metabolic acidosis    Leukocytosis    Elevated alkaline phosphatase level    Calculus of gallbladder with acute cholecystitis without obstruction    Splenomegaly    Macrocytosis    Dyspnea  Resolved Problems:    * No resolved hospital problems. *      Discussion / Plan:       Neurology  History of PTSD, depression    Sedated on  Fentanyl 150  Versed 5    Maintain sedation as the patient may end up in surgery      Cardiovascular  History of atrial fibrillation, hypertension  Continue with  Aspirin 81 mg daily  Lipitor 40 mg nightly      Bradycardic at this time  Will watch  May add dopamine      Hypotension  Vasopressin 0.01   Levophed 24 mcg/min      Pulmonary  Acute respiratory failure  VC plus, tidal volume 500, rate of 22, FiO2 of 40%, PEEP of 5, TI time of 1.1  Continue with the ventilator at this time as the patient did have a severe respiratory failure that led him in the hospitals, probably secondary to the fact that he was not able to take a full deep breath and became encephalopathic because of septic shock        Gastrointestinal   Pancreatic cancer    CT abdomen shows cholecystitis  General surgery has been consulted,    ERICKA drain in place and draining a purulent greenish material  Draining 50 ml/hr    I suspect that the patient is septic b/c of the cholecystitis  Pt is not improving  Will defer to Surgery about plan    Patient carries a very high risk postoperatively if this is a decision which has been talked to with surgery and the family however at this point the patient's situation is not improving. Liver function is stable, bilirubin is elevated but not increasing.   Lipase is normal  Lactic acid is normal    Maxipime  Protonix 40 mg daily    Endo  Blood sugars are controlled  TSH is normal     Renal  Renal insufficiency  Nephrology has been consulted  Creatinine holding at 2.4  Vas-Cath is been placed 12/7/2021  On CRRT      Prophylaxis  Heparin 5000 every 8 hours  Protonix 40 mg daily      Lines  [place]  Right IJ Vas-Cath placed 12/7/2021  OG  R [place] radial 12/87/21  ET tube placed 12/7/2021    Code Status: Full code          Infectious disease  Septic shock  On pressors    Culture growing  Culture, Anaerobic and Aerobic [[id number]] (Abnormal)  Collected: 12/06/21 1830   Order Status: Completed Specimen: Body Fluid from Gallbladder Updated: 12/08/21 1833    Gram Stain Result 1+ WBC's (Polymorphonuclear) present   2+ Gram positive cocci    Abnormal     Anaerobic Culture --    Anaerobic culture further report to follow   No anaerobes isolated so far, Further report to follow     Organism Escherichia coli Abnormal     WOUND/ABSCESS Moderate growth   Narrative:     ORDER#: [id number]                          ORDERED BY: ANIVAL BROCK        Continue with  Merrem         Discussed at multidisciplinary rounds, with dietitian, pharmacy, nursing              Critical Care Services Provided: This patient had a critical illness or injury that acutely impaired one or more vital organ systems such that there was a high probability of imminent or life-threatening deterioration in the patient's condition. This required high complexity decision-making to assess, manipulate, and support vital system function(s) to treat single or multiple vital organ system failure and/or to prevent further life-threatening deterioration of the patient's condition.  Total attending physician time, excluding unbundled procedures, spent at the bedside, and away from the bedside but on the unit or floor, reviewing laboratory test results, discussing care with medical staff, and discussing care with family when the patient was unable or incompetent to participate:   Critical Care Time (Minutes) # 35   (Discontinuous)           Selena Bailey MD  [facility]  Pulmonary, Critical Care and Sleep Medicine  Office : [phone]    Please note that some or all of this record was generated using voice recognition software. If there are any questions about the content of this document, please contact the author as some errors in transcription may have occurred.

## 2021-12-09 NOTE — PROGRESS NOTES
Patient seen and examined this am during ICU rounds  Intubated, sedated on CRRT  Requiring levophed/vasopressin for hemodynamic support  Percutaneous cholecystostomy tube in place with bile drainage  Persistent leukocytosis, E. Coli isolated from cultures, on merrem    High risk for further decompensation given current pressor requirements, renal failure. Discussed with General Surgery. Called patient's daughter Rosario Cuellar and clinical updated provided over the phone and encouraged to visit. Patient's daughters at the bedside, all questions answered at this time. Updated RN. Jeanna Antony, APRN-CNP

## 2021-12-09 NOTE — PROGRESS NOTES
[facility]    PATIENT NAME: Tej Thompson     TODAY'S DATE: 12/9/2021    CHIEF COMPLAINT: none    INTERVAL HISTORY/HPI:    Pt sedated on vent, on pressor support, running even on CRRT. REVIEW OF SYSTEMS:  Pertinent positives and negatives as per interval history section    OBJECTIVE:  VITALS:  BP (!) 114/52   Pulse (!) 47   Temp 97.5 °F (36.4 °C) (Bladder)   Resp 20   Ht 6' 2\" (1.88 m)   Wt 271 lb 1.2 oz (123 kg)   SpO2 100%   BMI 34.80 kg/m²     INTAKE/OUTPUT:    I/O last 3 completed shifts:  In: -   Out: 58 [Other:58]  No intake/output data recorded. CONSTITUTIONAL: sedated  LUNGS:  Respirations easy and unlabored, ventilated  CARD:  Regular rate  ABDOMEN:  hypoactive bowel sounds, soft, distended, mild tender, jose cruz dark bilious     Data:  CBC: Recent Labs     12/07/21  0621 12/08/21  0540 12/09/21  0557   WBC 36.7* 47.0* 37.1*   HGB 12.0* 12.5* 12.3*   HCT 37.5* 37.6* 36.5*    309 176     BMP:    Recent Labs     12/08/21  1440 12/08/21  2242 12/09/21  0557   * 133* 133*   K 3.7 4.1 4.0    100 100   CO2 20* 21 21   BUN 75* 68* 59*   CREATININE 3.0* 2.6* 2.4*   GLUCOSE 113* 129* 125*     Hepatic:   Recent Labs     12/07/21  0807 12/08/21  0540   AST 28 23   ALT 19 16   BILITOT 1.8* 1.8*   ALKPHOS 253* 238*     Mag:      Recent Labs     12/08/21  1440 12/08/21  2242 12/09/21  0557   MG 2.00 2.10 2.10      Phos:     Recent Labs     12/08/21  1440 12/08/21  2242 12/09/21  0557   PHOS 3.6 3.9 3.7      INR:   Recent Labs     12/07/21  1048 12/09/21  1018   INR 1.75* 1.73*       Radiology Review:  *Imaging personally reviewed by me. NA      ASSESSMENT AND PLAN:  79 yo s/p percutaneous cholecystostomy  1. Would still hold off on cholecystectomy at this time. 2.  Continue IV abx, pressor support as needed, jose cruz to bulb suction  3. Looking back at his LFTs has had isolated elevation of alkaline phosphatase and direct bilirubin.   This suggests a possible common duct obstructive process. This could also explain why his jose cruz is having high output of nonpurulent bile due to decompression via this route.        Electronically signed by Mana Amato, [address] [place]

## 2021-12-09 NOTE — PROGRESS NOTES
12/09/21 1703   Vent Information   Skin Assessment Clean, dry, & intact   Equipment Changed HME   Vent Type 980   Vent Mode AC/VC+   Vt Ordered 500 mL   Rate Set 22 bmp   Pressure Support 0 cmH20   FiO2  40 %   SpO2 98 %   SpO2/FiO2 ratio 245   Sensitivity 3   PEEP/CPAP 5   I Time/ I Time % 0.95 s   Humidification Source HME   Vent Patient Data   Peak Inspiratory Pressure 30 cmH2O   Mean Airway Pressure 13 cmH20   Rate Measured 22 br/min   Vt Exhaled 510 mL   Minute Volume 11.2 Liters   I:E Ratio 1:1.90   Cough/Sputum   Sputum How Obtained Oral; Endotracheal; Suctioned   Cough Non-productive   Sputum Amount None   Spontaneous Breathing Trial (SBT) RT Doc   Pulse (!) 46   Breath Sounds   Right Upper Lobe Diminished   Right Middle Lobe Diminished   Right Lower Lobe Diminished   Left Upper Lobe Diminished   Left Lower Lobe Diminished   Additional Respiratory  Assessments   Resp 20   Position Semi-Caldwell's   Oral Care Mouth suctioned   Cuff Pressure (cm H2O) 32 cm H2O   Alarm Settings   High Pressure Alarm 45 cmH2O   Low Minute Volume Alarm 2 L/min   High Respiratory Rate 40 br/min   Low Exhaled Vt  200 mL   Patient Observation   Observations ambu bag and mask  at bedside   ETT (adult)   Placement Date/Time: 12/07/21 1405   Preoxygenation: Yes  Mask Ventilation: Ventilated by mask (1)  Technique: Rapid sequence; Video laryngoscopy  Type: Cuffed  Tube Size: 8 mm  Laryngoscope: GlideScope  Blade Size: 4  Location: Oral  Insertion attemp. ..    Secured at 26 cm   Measured From Lips   ET Placement Right   Secured By Commercial tube saeed   Site Condition Dry   Cuff Pressure 32 cm H2O

## 2021-12-09 NOTE — PROGRESS NOTES
[facility] Progress Note           Subjective/   78y.o. year old male who we are seeing in consultation for REJI requiring HD. HPI:  Renal function slowly worsening, started HD on 12/7 c/b respiratory arrest, now on ventilator. Transitioned to CRRT on 12/7. Seen on CRRT. Orders confirmed. Hypotensive, remains on pressors. ROS:  Remains on ventilator. Mentation reduced, intake reduced, no fevers. Objective/   GEN:  Chronically ill, /62   Pulse (!) 45   Temp 98.4 °F (36.9 °C) (Bladder)   Resp 22   Ht 6' 2\" (1.88 m)   Wt 271 lb 1.2 oz (123 kg)   SpO2 99%   BMI 34.80 kg/m²   HEENT: non-icteric, no JVD  CV: S1, S2 without m/r/g; no LE edema  RESP: CTA B without w/r/r; mechanically ventilated  ABD: decreased bs, soft, distended, tender, perc marylin  SKIN: warm, no rashes  ACCESS: R IJ vascath (12/7)    Data/  Recent Labs     12/07/21  0621 12/08/21  0540 12/09/21  0557   WBC 36.7* 47.0* 37.1*   HGB 12.0* 12.5* 12.3*   HCT 37.5* 37.6* 36.5*   .6* 108.8* 108.4*    309 176     Recent Labs     12/08/21  1440 12/08/21  2242 12/09/21  0557   * 133* 133*   K 3.7 4.1 4.0    100 100   CO2 20* 21 21   GLUCOSE 113* 129* 125*   PHOS 3.6 3.9 3.7   MG 2.00 2.10 2.10   BUN 75* 68* 59*   CREATININE 3.0* 2.6* 2.4*   LABGLOM 21* 24* 27*   GFRAA 25* 29* 32*       Assessment/     -REJI in the setting of septic shock from acute cholecystitis and use of motrin.     SCr up to mid 4 range   UA bland and no obstruction noted   Started HD, CRRT since 12/7     -Septic shock              Likely from acute cholecystitis    -Acute metabolic encephalopathy    - Acute hypoxic, hypercarbic respiratory failure - on ventilator    -Hyponatremia     -Metabolic acidosis     -Bilateral renal cortical masses              Will need MRI at a later date    Plan/     - Continue CRRT with even fluid balance  - Continue pressors to maintain MAP of 65  - Trend labs, bp's, & urine output    Case d/w ICU team    ____________________________________  Lorelei Delarosa MD  [facility]  [email or web address]  Office: [phone]

## 2021-12-09 NOTE — PROGRESS NOTES
Hospitalist Progress Note      PCP: Mayra Jovel MD    Date of Admission: 12/4/2021    Chief Complaint: Generalized weakness    Hospital Course:   Rboerto Mayo a 78 y. o. male w/ hx of COPD on 4L NC as needed, HTN, HLD, afib, and neuroendocrine pancreatic cancer who presents with shortness of breath and fatigue.  Reports a few days before Thanksgiving he began feeling tired, feverish, and short of breath.  He took some Tylenol which did help with the fever.  Since Thanksgiving he has continued to feel weak, rundown, nauseous, SOB, had loss of appetite and diarrhea.  He feels the weakness is worse in his legs.  He denies any vomiting but there is evidence of dark brown emesis in oral cavity, on chest, and on the front of his shirt. He  complaint right upper quadrant pain in the ER      Subjective: Intubated and sedated on Ventilator. WBC remains elevated at 47 -37 post drain insertion. Drain patent and draining well.   -Labs: Cr 2.4, HCO3 21 on CRRT. Nephrology plan to maintain even fluid balance. General Surgery watchful waiting for GB and need for potential surgery. Patient persistent with hypotension and requiring increased support to BP.    -Levophed and addition of Vasopressin  -Noting on physical exam, mottling to feet R>L. Close monitoring by Nursing staff. PREVIOUS  12/7/21: Addendum: Rapid Response 1:45 PM  Patient while at HD became unresponsive with agonal breathing. HR 70-75, NSR.  /75. Patient obtunded and not responsive to verbal or tactile commands. RR gasp at 5-6. Pulmonary Critical Care Team called to bedside for Emergent intubation. Patient was successfully intubated by NP Kadi Vines. Plan: Transfer to ICU Floor and CRRT    Bedside rounding with nursing completed. No new medical complaints.        Medications:  Reviewed    Infusion Medications    heparin 5000 units CRRT syringe 2 mL/hr at 12/08/21 7841    vasopressin (Septic Shock) infusion 0.03 Units/min (12/08/21 2305)    IV infusion builder Stopped (12/07/21 1700)    prismaSATE BGK 4/2.5 500 mL/hr at 12/08/21 1500    prismaSATE BGK 4/2.5 250 mL/hr at 12/08/21 1500    prismaSATE BGK 4/2.5 1,000 mL/hr at 12/08/21 2358    fentaNYL (SUBLIMAZE) infusion 150 mcg/hr (12/09/21 0728)    midazolam 5 mg/hr (12/08/21 2226)    dextrose      norepinephrine 24 mcg/min (12/09/21 0218)    sodium chloride       Scheduled Medications    meropenem  1,000 mg IntraVENous Q12H    chlorhexidine  15 mL Mouth/Throat BID    carboxymethylcellulose PF  1 drop Both Eyes 6 times per day    influenza virus vaccine  0.5 mL IntraMUSCular Prior to discharge    aspirin  81 mg Oral Daily    atorvastatin  40 mg Oral Nightly    sodium chloride flush  5-40 mL IntraVENous 2 times per day    heparin (porcine)  5,000 Units SubCUTAneous 3 times per day    pantoprazole  40 mg IntraVENous Daily     PRN Meds: magnesium sulfate, sodium phosphate IVPB **OR** sodium phosphate IVPB **OR** sodium phosphate IVPB **OR** sodium phosphate IVPB, fentanNYL, calcium gluconate **OR** calcium gluconate **OR** calcium gluconate **OR** calcium gluconate, dextrose, glucose, dextrose, glucagon (rDNA), dextrose, sodium chloride flush, sodium chloride, acetaminophen **OR** acetaminophen, ipratropium-albuterol      Intake/Output Summary (Last 24 hours) at 12/9/2021 0855  Last data filed at 12/8/2021 1615  Gross per 24 hour   Intake    Output 58 ml   Net -58 ml       Physical Exam Performed:    /70   Pulse (!) 46   Temp 98.1 °F (36.7 °C) (Bladder)   Resp 17   Ht 6' 2\" (1.88 m)   Wt 271 lb 1.2 oz (123 kg)   SpO2 100%   BMI 34.80 kg/m²     General appearance: Intubated Sedated  HEENT: Pupils equal, round, and reactive to light. Conjunctivae/corneas clear. Neck: Supple, with full range of motion. No jugular venous distention. Trachea midline. Respiratory:  Normal respiratory effort. CTA bilat to anterior fields. Synchronous with vent. Cardiovascular: Regular rate and rhythm with normal S1/S2 without murmurs, rubs or gallops. Abdomen: Soft, non-tender, non-distended with normal bowel sounds. Obese, protuberant. No guarding or rebound. Drain intact: Draining bilious material.   Musculoskeletal: No clubbing, cyanosis or edema bilaterally. Feet mottled Bilat. R/L. Discoloration of toes noted. Distal pulses present to DP/PT. Poor toenail care. Skin: Skin color, texture, turgor normal.  No rashes or lesions. Neurologic:  Unable to assess: Intubated and sedated. Capillary Refill: Brisk,3 seconds, normal   Peripheral Pulses: +2 palpable, equal bilaterally       Labs:   Recent Labs     12/07/21  0621 12/08/21  0540 12/09/21  0557   WBC 36.7* 47.0* 37.1*   HGB 12.0* 12.5* 12.3*   HCT 37.5* 37.6* 36.5*    309 176     Recent Labs     12/08/21  1440 12/08/21  2242 12/09/21  0557   * 133* 133*   K 3.7 4.1 4.0    100 100   CO2 20* 21 21   BUN 75* 68* 59*   CREATININE 3.0* 2.6* 2.4*   CALCIUM 7.8* 8.5 8.7   PHOS 3.6 3.9 3.7     Recent Labs     12/07/21  0807 12/08/21  0540   AST 28 23   ALT 19 16   BILIDIR 1.1* 1.0*   BILITOT 1.8* 1.8*   ALKPHOS 253* 238*     Recent Labs     12/07/21  1048   INR 1.75*     No results for input(s): CKTOTAL, TROPONINI in the last 72 hours. Urinalysis:      Lab Results   Component Value Date    NITRU Negative 12/04/2021    WBCUA 3-5 12/04/2021    BACTERIA Rare 12/04/2021    RBCUA 3-4 12/04/2021    BLOODU Negative 12/04/2021    SPECGRAV >=1.030 12/04/2021    GLUCOSEU Negative 12/04/2021       Radiology:  XR CHEST PORTABLE   Final Result   Relatively stable appearance of the chest.  Perihilar and bibasilar airspace   opacities with small pleural effusions. XR ABDOMEN (KUB) (SINGLE AP VIEW)   Final Result      1. NG tube in place with the tip in the fundus of the stomach and the side   port at or just distal to the GE junction.       2.  What can be seen of the bowel gas pattern appears nonspecific. XR CHEST PORTABLE   Final Result   Linear areas of platelike atelectasis in both lungs. Various support tubes   and catheters are in good position without evidence of complication. IR NONTUNNELED VASCULAR CATHETER > 5 YEARS   Final Result   Successful ultrasound guided non-tunneled dialysis catheter placement. US ABSCESS DRAINAGE PERITONEAL/RETROPERITONEAL PERC   Final Result   Technically successful CT and ultrasound-guided 10 [place] percutaneous   cholecystostomy tube placement. CT [facility] CHOLECYSTOSTOMY   Final Result   Technically successful CT and ultrasound-guided 10 Mexican percutaneous   cholecystostomy tube placement. CT GUIDED NEEDLE PLACEMENT   Final Result   Technically successful CT and ultrasound-guided 10 Mexican percutaneous   cholecystostomy tube placement. XR CHEST PORTABLE   Final Result   Right IJ central venous catheter with catheter tip cavoatrial junction. Bibasilar atelectasis/pneumonitis. Pulmonary vascular congestion. US GALLBLADDER RUQ   Final Result   Cholelithiasis with a significant amount of echogenic material in the   gallbladder suggesting biliary sludge. The gallbladder is dilated. 6 cm cyst inferiorly in the right kidney         CT ABDOMEN PELVIS WO CONTRAST Additional Contrast? None   Final Result   1. Cholelithiasis with suggestion of gallbladder wall thickening. If patient   has right upper quadrant abdominal pain, ultrasound would better assess   gallbladder. 2. Right lower lobe and left lobe airspace consolidations in the lungs. Please correlate with clinical symptoms of pneumonia. 3. Nodular liver contour and splenomegaly. Please correlate with clinical   history of cirrhosis and portal hypertension. 4. Small volume of ascites in the pelvis and along the mesentery. 5. Sigmoid diverticulosis. No evidence of diverticulitis. 6. Nonobstructing left nephrolithiasis.    7. Bilateral exophytic renal cortical masses, including suspected   hemorrhagic/proteinaceous cyst  arising from the left lower renal pole. Additional hypodensities are not adequately assessed on this noncontrast exam   and would better be assessed on a nonemergent basis with MR per renal mass   protocol. XR CHEST PORTABLE   Final Result   Mild right basilar atelectasis. Assessment/Plan:    Active Hospital Problems    Diagnosis     Dyspnea [R06.00]     REJI (acute kidney injury) ([address] [N17.9]     Pulmonary infiltrates [R91.8]     Normal anion gap metabolic acidosis [D99.9]     Leukocytosis [D72.829]     Elevated alkaline phosphatase level [R74.8]     Calculus of gallbladder with acute cholecystitis without obstruction [K80.00]     Splenomegaly [R16.1]     Macrocytosis [D75.89]     Cholecystitis [K81.9]     Shock, septic ([address] [A41.9, R65.21]      Septic Shock with worsening leukocytosis  -Secondary to acute cholecystitis. Pulmonary infiltrates seen on CT Abd, CXR wihtout acute ASD. - Transfer to ICU and started on IV Levophed with CRRT. Patient was previously titrated off on 12/5 HS. Now requirinf Levo/Vaso  -Micro: Blood Clx: NGTD, UA bland,   - Abx:  Merrem, Vanco ( DC on 12/7 due to worsening REJI)   - Consult General Surg:  IR to place percutaneous cholecysostomy tube 12/6. Biliary drainage to be collected by IR at T tube insertion. Watchful waiting for potential surgery. Hypothermia:   -Secondary to sepsis and infection  -Nursing instructed to verify temp rectally  -Joey Hugger for Temperature stability  -Continue to treat infection as outlined above. Acute kidney injury   -Secondary to hypotension, hypovolemia, infection, and Motrin/Vanco  -Vas cath placed and started on CRRT  - continue with IV hydration   - Labs: check daily BMP  -Avoid unnecessary nephrotoxins.    -Judicious use of Vanco and taper with ID Micro culture data  - Consult nephrology: Bilat Renal masses will need MRI FU once pt stable. Metabolic Acidosis:   -Secondary to REJI/poor renal function  -IV Bicarb gtt  -Labs: BMP    Essential Hypertension and HLD without CAD  - Hypotensive/Shock on POA  - Meds:  antihypertensive medication on hold due to hypotension. Restart Lipitor  -Cont to optimize BP and Cholesterol as outpt     Macrocytic Anemia  -Stable  -Labs: B12, Folate in range  -Iron labs ordered  -Continue to follow    History of a neuroendocrine pancreatic cancer with episodes of hypoglycemia  -Stable  -Patient will need a glucose supply. TF to be initiated. -Cont to FU with outpt Oncologist.     DVT Prophylaxis: Heparin subcu  Diet: Diet NPO Exceptions are: Ice Chips, Sips of Water with Meds  ADULT TUBE FEEDING; Nasogastric; Peptide Based High Protein; Continuous; 10; No; 60; Q 4 hours; Protein; 2 bottles of proteinex daily. 30 mL free water flush before and after each administration  Code Status: Full Code    PT/OT Eval Status: On hold in ICU    Dispo - Expect 2-3 days pending clinical response to medical therapy.      Sherri Senior MD

## 2021-12-09 NOTE — PROGRESS NOTES
12/09/21 0334   Vent Information   Vent Type 980   Vent Mode AC/VC+   Vt Ordered 500 mL   Rate Set 22 bmp   Pressure Support 0 cmH20   FiO2  40 %   Sensitivity 3   PEEP/CPAP 5   I Time/ I Time % 0.95 s   Humidification Source HME   Vent Patient Data   Peak Inspiratory Pressure 42 cmH2O   Mean Airway Pressure 18 cmH20   Rate Measured 22 br/min   Vt Exhaled 506 mL   Minute Volume 11 Liters   I:E Ratio 1:1.90   Cough/Sputum   Sputum How Obtained Endotracheal; Suctioned   Cough Productive   Sputum Amount Moderate   Sputum Color Creamy   Tenacity Thick   Spontaneous Breathing Trial (SBT) RT Doc   Pulse 51   Breath Sounds   Right Upper Lobe Rhonchi   Right Middle Lobe Diminished   Right Lower Lobe Diminished   Left Upper Lobe Rhonchi   Left Lower Lobe Diminished   Additional Respiratory  Assessments   Resp 19   Alarm Settings   High Pressure Alarm 45 cmH2O   Low Minute Volume Alarm 2 L/min   High Respiratory Rate 40 br/min   Low Exhaled Vt  200 mL   ETT (adult)   Placement Date/Time: 12/07/21 1405   Preoxygenation: Yes  Mask Ventilation: Ventilated by mask (1)  Technique: Rapid sequence; Video laryngoscopy  Type: Cuffed  Tube Size: 8 mm  Laryngoscope: GlideScope  Blade Size: 4  Location: Oral  Insertion attemp. ..    Secured at 26 cm   Measured From Lips   ET Placement Right   Secured By Commercial tube saeed   Site Condition Dry   Cuff Pressure 30 cm H2O

## 2021-12-09 NOTE — PROGRESS NOTES
12/09/21 0754   Vent Information   Skin Assessment Clean, dry, & intact   Equipment Changed HME   Vent Type 980   Vt Ordered 500 mL   Rate Set 22 bmp   Pressure Support 0 cmH20   FiO2  40 %   SpO2 99 %   SpO2/FiO2 ratio 247.5   Sensitivity 3   PEEP/CPAP 5   I Time/ I Time % 0.95 s   Humidification Source HME   Vent Patient Data   Peak Inspiratory Pressure 27 cmH2O   Mean Airway Pressure 13 cmH20   Rate Measured 22 br/min   Vt Exhaled 508 mL   Minute Volume 11.2 Liters   I:E Ratio 1:1.90   Cough/Sputum   Sputum How Obtained Oral; Endotracheal; Suctioned   Cough Productive   Sputum Amount Small   Sputum Color Cloudy   Tenacity Thick   Spontaneous Breathing Trial (SBT) RT Doc   Pulse (!) 45   Breath Sounds   Right Upper Lobe Rhonchi   Right Middle Lobe Rhonchi   Right Lower Lobe Rhonchi   Left Upper Lobe Rhonchi   Left Lower Lobe Rhonchi   Additional Respiratory  Assessments   Resp 22   Oral Care Mouth suctioned   Cuff Pressure (cm H2O) 30 cm H2O   Alarm Settings   High Pressure Alarm 45 cmH2O   Low Minute Volume Alarm 2 L/min   High Respiratory Rate 40 br/min   Low Exhaled Vt  200 mL   Patient Observation   Observations ambu bag and mask at bedside, alarms on and audible, apnea parameters on   ETT (adult)   Placement Date/Time: 12/07/21 1405   Preoxygenation: Yes  Mask Ventilation: Ventilated by mask (1)  Technique: Rapid sequence; Video laryngoscopy  Type: Cuffed  Tube Size: 8 mm  Laryngoscope: GlideScope  Blade Size: 4  Location: Oral  Insertion attemp. ..    Secured at 26 cm   Measured From [address] [place] [address],[address] By Commercial tube saeed   Site Condition Dry   Cuff Pressure 30 cm H2O

## 2021-12-09 NOTE — PROGRESS NOTES
12/08/21 2035   Vent Information   Vent Type 980   Vent Mode AC/VC+   Vt Ordered 500 mL   Rate Set 22 bmp   Pressure Support 0 cmH20   FiO2  40 %   SpO2 100 %   SpO2/FiO2 ratio 250   Sensitivity 3   PEEP/CPAP 5   I Time/ I Time % 0.95 s   Humidification Source HME   Vent Patient Data   Peak Inspiratory Pressure 28 cmH2O   Mean Airway Pressure 13 cmH20   Rate Measured 22 br/min   Vt Exhaled 513 mL   Minute Volume 11.2 Liters   I:E Ratio 1:1.90   Cough/Sputum   Sputum How Obtained Endotracheal   Cough None   Spontaneous Breathing Trial (SBT) RT Doc   Pulse (!) 47   Breath Sounds   Right Upper Lobe Diminished   Right Middle Lobe Diminished   Right Lower Lobe Diminished   Left Upper Lobe Diminished   Left Lower Lobe Diminished   Additional Respiratory  Assessments   Resp 22   Position Semi-Caldwell's   Cuff Pressure (cm H2O) 30 cm H2O   Alarm Settings   High Pressure Alarm 45 cmH2O   Low Minute Volume Alarm 2 L/min   High Respiratory Rate 40 br/min   ETT (adult)   Placement Date/Time: 12/07/21 1405   Preoxygenation: Yes  Mask Ventilation: Ventilated by mask (1)  Technique: Rapid sequence; Video laryngoscopy  Type: Cuffed  Tube Size: 8 mm  Laryngoscope: GlideScope  Blade Size: 4  Location: Oral  Insertion attemp. ..    Secured at 26 cm   Measured From Lips   ET Placement Right   Secured By Commercial tube saeed   Site Condition Dry   Cuff Pressure 30 cm H2O

## 2021-12-09 NOTE — PROGRESS NOTES
4 Eyes Skin Assessment     The patient is being assess for   Shift Handoff    I agree that 2 RN's have performed a thorough Head to Toe Skin Assessment on the patient. ALL assessment sites listed below have been assessed. Areas assessed for pressure by both nurses:   [x]   Head, Face, and Ears   [x]   Shoulders, Back, and Chest, Abdomen  [x]   Arms, Elbows, and Hands   [x]   Coccyx, Sacrum, and Ischium  [x]   Legs, Feet, and Heels        Skin Assessed Under all Medical Devices by both nurses:  ETT, elizabeth and OG              All Mepilex Borders were peeled back and area peeked at by both nurses:  Yes  Please list where Mepilex Borders are located:  sacrum             **SHARE this note so that the co-signing nurse is able to place an eSignature**    Co-signer eSignature: Electronically signed by Jose A Mancini RN on 12/8/21 at 11:42 PM EST    Does the Patient have Skin Breakdown related to pressure?   No          K[place]y Prevention initiated:  Yes   Wound Care Orders initiated:  NA      [facility] nurse consulted for Pressure Injury (Stage 3,4, Unstageable, DTI, NWPT, Complex wounds)and New or Established Ostomies:  NA      Primary Nurse eSignature: Electronically signed by Nora Flores RN on 12/8/21 at 9:51 PM EST

## 2021-12-10 NOTE — PROGRESS NOTES
Discussed with ICU team.  Pt remains critically ill, requiring increasing hemodynamic support, still on continuous dialysis. As per our previous discussions, do not feel surgical intervention would be advisable, as pt is too unstable to be expected to tolerate surgery. Per ICU teams, family is planning to withdraw care. Will follow remotely at this time. Please call w/ further questions.     DTW

## 2021-12-10 NOTE — PROGRESS NOTES
TPN initiation. Malnutrition Assessment:  Malnutrition Status: At risk for malnutrition (Comment)      Estimated Daily Nutrient Needs:  Energy (kcal):  5650-3446 kcal; Weight Used for Energy Requirements:  Current (119 kg)     Protein (g):  172-190 g; Weight Used for Protein Requirements:  Ideal (2.0-2.2 g/kg)        Fluid (ml/day):   ; Method Used for Fluid Requirements:  1 ml/kcal      Nutrition Related Findings:  ERICKA drain. Na 133. Lytes WNL. Wounds:  None       Current Nutrition Therapies:    Diet NPO Exceptions are: Ice Chips, Sips of Water with Meds  ADULT TUBE FEEDING; Nasogastric; Peptide Based High Protein; Continuous; 10; No; 60; Q 4 hours; Protein; 2 bottles of proteinex daily. 30 mL free water flush before and after each administration  Current Tube Feeding (TF) Orders:  · Feeding Route: Nasogastric  · Formula: Peptide Based High Protein  · Schedule: Continuous  · Additives/Modulars: Protein  · Goal TF & Flush Orders Provides: Vital HP at goal rate of 65 mL/hr to provide 1300 mL TV, 1300 kcal, 114 g protein, and 1087 mL free water. +2 bottles of proteinex daily to provide additional 52 g protein and 208 kcal. +60 mL free water flush    Current Parenteral Nutrition Orders:  · Type and Formula: Premix Central   · Lipids: Two times weekly  · Duration: Continuous  · Goal PN Orders Provides: Clinimix 5/20 E at goal rate of 75 mL/hr x24 hours to provide 1800 mL TV, 90 g protein, 360 g dextrose, and 1584 kcal. +250 mL bags of 20% lipids twice weekly      Anthropometric Measures:  · Height: 6' 2\" (188 cm)  · Current Body Weight: 271 lb (122.9 kg)   · Ideal Body Weight: 190 lbs; % Ideal Body Weight 137.4 %   · BMI: 34.8  · BMI Categories: Obese Class 1 (BMI 30.0-34. 9)       Nutrition Diagnosis:   · Inadequate energy intake related to inadequate protein-energy intake, impaired respiratory function as evidenced by NPO or clear liquid status due to medical condition, intubation, nutrition support - enteral nutrition      Nutrition Interventions:   Food and/or Nutrient Delivery:  Continue NPO (TFs vs. TPN)  Nutrition Education/Counseling:  No recommendation at this time   Coordination of Nutrition Care:  Continue to monitor while inpatient, Interdisciplinary Rounds    Goals: Tolerate TFs at goal rate this admission w/o GI distress       Nutrition Monitoring and Evaluation:   Behavioral-Environmental Outcomes:  None Identified   Food/Nutrient Intake Outcomes:  Enteral Nutrition Intake/Tolerance  Physical Signs/Symptoms Outcomes:  Biochemical Data, Hemodynamic Status, Nutrition Focused Physical Findings, Weight     Discharge Planning:     Too soon to determine     Electronically signed by Kassidy Blas, MS, RD, LD on 12/10/21 at 10:42 AM EST    Contact: [phone]

## 2021-12-10 NOTE — PROGRESS NOTES
Intensive Care Progress Note    Patient: Froylan Georges MRN: [id number]  Date of  Admission: 12/4/2021   YOB: 1945  Age: 78 y.o. Sex: male    Unit: [address] [address] CVU  Room/Bed: 0234/0234-01 Attending Physician: Nicole Ch MD   Admitting Physician: Madeline Dupree          Chief Complaint/Referring Provider:  Patient is being seen at the request of Dr. Sinan Clifford  for a consultation for septic shock      Presenting HPI: Patient was brought to the hospital with generalized weakness/shortness of breath        As per  ER provider-Jose Staley is a 78 y. o. male w/ hx of COPD on 4L NC as needed, HTN, HLD, afib, and neuroendocrine pancreatic cancer who presents with shortness of breath and fatigue.  Reports a few days before Thanksgiving he began feeling tired, feverish, and short of breath.  He took some Tylenol which did help with the fever.  Since Thanksgiving he has continued to feel weak, rundown, nauseous, SOB, had loss of appetite and diarrhea.  He feels the weakness is worse in his legs.  He denies any vomiting but there is evidence of dark brown emesis in oral cavity, on chest, and on the front of his shirt.   ED course -This is a 79-year-old male who presents with shortness of breath, nausea, and weakness.  Upon arrival, he is ill-appearing, pale, and hypotensive.  On exam he he has dry mucous membranes with dried black spots on his tongue, neck, and chest.  Right lower quadrant was tender to palpation, he has generalized weakness, and diminished reflexes.  Patient was started on fluids for hypotension. CT abdomen was ordered, pending.  CXR showed mild right basilar atelectasis and so with hx of COPD patient was given Solu-Medrol. Troponin, lipase, and lactate were unremarkable. UA and occult stool collected, pending.  WBC was 21.2, H/H 13.0/38.6, .0, BUN 91, creatinine 3.6, GFR 17 total bilirubin 2.3, and alk phos 311.0.   To the ICU for mother management for septic shock for further management  Patient when seen this morning continues to be critically ill, patient was on IV Levophed infusion to maintain hemodynamics, patient's norepinephrine infusion requirement had gone up overnight, patient was afebrile, patient on questioning further does not have any significant profound confusion which he had last night as per nursing, patient complaining of generalized body aches and chronic pain, patient also is complaining of right abdominal pain, patient also has been having diarrhea and constipation as per the patient, patient does not have any significant expectoration, patient does not have any significant epistaxis or hemoptysis or any hematochezia or melena, patient does not have any increasing leg edema, patient has had some shortness of breath, patient on questioning further apparently has chronic respiratory failure requiring 4 L of nasal cannula oxygen at home,  Patient when seen also had some hypothermia overnight, patient was on 2 L of nasal can oxygen with saturation of 96% when evaluated, patient has had no urine output overnight with cumulative fluid balance of +860 mL, patient's p.o. intake has been on the lower side, patient does not give any other pertinent review of system of concern objectivelyb    12/4/2021admit      12/5/2021pulmonary consulted, Levophed started and stopped    12/6/2021off of Levophed no issues with pressures      12/7/2021patient had a rapid response, acute respiratory decompensation during dialysis and patient was intubated and transferred to the ICU    12/8/2021patient on ventilator, pressor agents have been added, surgery is seeing the patient, ERICKA drain draining 50 mL/h        12/9/21 - still draining from the ERICKA drain,   Still requiring pressors    Subjective:   Patient on CRRT and keeping even  Having significant amount of output from the ERICKA drain that is in the gallbladder space  NG tube also having greenish output  Tolerating vent    Surgery has seen the patient  Patient has not had any fevers  Pulse have been low at 46    Patient continues to be on ventilator, increased pressor requirement      ROS:Review of systems not obtained due to patient factors. Objective: Intake and Output:   Current Shift:   No intake/output data recorded. Last three shifts:   12/09 0701 - 12/10 0700  In: -   Out: 18     Vent settings for last 24 hours:  [unfilled]    Hemodynamic parameters for last 24 hours:  [unfilled]    Physical Exam:   Patient Vitals for the past 24 hrs:   BP Temp Temp src Pulse Resp SpO2   12/10/21 0600 99/64   (!) 48 22 96 %   12/10/21 0500 95/66   (!) 49 22    12/10/21 0408    (!) 49 22    12/10/21 0400 109/65 97.5 °F (36.4 °C) Bladder (!) 49 22 99 %   12/10/21 0300    (!) 49 22    12/10/21 0200 107/62   (!) 47 20    12/10/21 0006    53 22    12/10/21 0000 105/63 97.6 °F (36.4 °C) Bladder (!) 49 22 99 %   12/09/21 2300 103/71   (!) 47 22    12/09/21 2200 111/71   (!) 48 22    12/09/21 2100 (!) 95/57   (!) 49 22    12/09/21 2001    (!) 47 22    12/09/21 2000 116/62 97.4 °F (36.3 °C) Bladder (!) 46 19 98 %   12/09/21 1900 117/70   (!) 47 22    12/09/21 1703    (!) 46 20 98 %   12/09/21 1500 107/75   (!) 46 22 99 %   12/09/21 1400 123/64   (!) 45 22 100 %   12/09/21 1300 (!) 114/52   (!) 47 20    12/09/21 1200 112/62 97.5 °F (36.4 °C) Bladder (!) 47 22 100 %   12/09/21 1154    (!) 46 22    12/09/21 1100 111/60   (!) 47 22    12/09/21 1000 (!) 117/54   (!) 46 22    12/09/21 0900 108/62   (!) 47 22             Physical Exam  Vitals and nursing note reviewed. Constitutional:       General: He is not in acute distress. Appearance: Normal appearance. He is obese. He is ill-appearing. Comments: Intubated sedated   HENT:      Head: Normocephalic and atraumatic.       Right Ear: External ear normal.      Left Ear: External ear normal.      Nose: Nose normal. Mouth/Throat:      Comments: Intubated  Eyes:      General:         Right eye: No discharge. Left eye: No discharge. Extraocular Movements: Extraocular movements intact. Conjunctiva/sclera: Conjunctivae normal.      Pupils: Pupils are equal, round, and reactive to light. Cardiovascular:      Rate and Rhythm: Normal rate and regular rhythm. Pulses: Normal pulses. Heart sounds: No murmur heard. Pulmonary:      Effort: No respiratory distress. Breath sounds: No wheezing or rales. Comments: Decreased breath sounds at the bases  Abdominal:      General: There is distension. Comments: Hypoactive bowel sounds  ERICKA drain in place   Musculoskeletal:         General: No swelling. Normal range of motion. Cervical back: Normal range of motion. No rigidity. Skin:     General: Skin is warm and dry. Coloration: Skin is not jaundiced or pale. Neurological:      Comments: Sedated   Psychiatric:      Comments: Sedated               Labs:   Recent Labs     12/08/21  0540 12/09/21  0557 12/10/21  0611   WBC 47.0* 37.1* 37.8*   HGB 12.5* 12.3* 12.4*   HCT 37.6* 36.5* 37.1*    176 148      Recent Labs     12/09/21  1502 12/09/21  2345 12/10/21  0611   * 131* 133*   K 4.1 4.2 4.2   CL 99 97* 100   CO2 23 23 24   BUN 46* 38* 32*   GLUCOSE 121* 116* 111*       Additional labs:    Radiology, images personally reviewed. XR ABDOMEN (KUB) (SINGLE AP VIEW) [[id number]] Collected: 12/07/21 1457      Order Status: Completed Updated: 12/07/21 1501     Narrative:         EXAM:     XR Abdomen, 1 View     EXAM DATE/TIME:     12/7/2021 2:46 pm     CLINICAL HISTORY:     ORDERING SYSTEM PROVIDED  NG placement  TECHNOLOGIST PROVIDED HISTORY:   Reason for exam:->NG placement  Reason for Exam: NG placement  Acuity: Acute   Type of Exam: Initial     TECHNIQUE:     Frontal supine view of the abdomen/pelvis. COMPARISON:     No relevant prior studies available.      FINDINGS: Gastrointestinal tract:  No acute findings.  No dilation. Bones/joints:  Degenerative changes of the spine. Tubes, lines and devices:  NG tube in place with the tip in the fundus of the   stomach and the side port at or just distal to the GE junction.      Impression:         1.  NG tube in place with the tip in the fundus of the stomach and the side   port at or just distal to the GE junction. 2. Stephanie Pates can be seen of the bowel gas pattern appears nonspecific. Other imaging:  XR ABDOMEN (KUB) (SINGLE AP VIEW) [[id number]] Collected: 12/07/21 1457      Order Status: Completed Updated: 12/07/21 1501     Narrative:         EXAM:     XR Abdomen, 1 View     EXAM DATE/TIME:     12/7/2021 2:46 pm     CLINICAL HISTORY:     ORDERING SYSTEM PROVIDED  NG placement  TECHNOLOGIST PROVIDED HISTORY:   Reason for exam:->NG placement  Reason for Exam: NG placement  Acuity: Acute   Type of Exam: Initial     TECHNIQUE:     Frontal supine view of the abdomen/pelvis. COMPARISON:     No relevant prior studies available. FINDINGS:     Gastrointestinal tract:  No acute findings.  No dilation. Bones/joints:  Degenerative changes of the spine. Tubes, lines and devices:  NG tube in place with the tip in the fundus of the   stomach and the side port at or just distal to the GE junction.      Impression:         1.  NG tube in place with the tip in the fundus of the stomach and the side   port at or just distal to the GE junction. 2. Stephanie Pates can be seen of the bowel gas pattern appears nonspecific.               Micro: Negative growth to date    Assessment:     Active Problems:    Shock, septic ([address]    Cholecystitis    REJI (acute kidney injury) ([address]    Pulmonary infiltrates    Normal anion gap metabolic acidosis    Leukocytosis    Elevated alkaline phosphatase level    Calculus of gallbladder with acute cholecystitis without obstruction    Splenomegaly    Macrocytosis    Dyspnea  Resolved Problems:    * No resolved hospital problems. *      Discussion / Plan:       Neurology  History of PTSD, depression    Sedated on  Fentanyl 150  Versed 5    Maintain sedation as the patient may end up in surgery      Cardiovascular  History of atrial fibrillation, hypertension  Continue with  Aspirin 81 mg daily  Lipitor 40 mg nightly      Bradycardic at this time  Will watch  May add dopamine      Hypotension  Vasopressin 0.01   Levophed 24 mcg/min      Pulmonary  Acute respiratory failure  VC plus, tidal volume 500, rate of 22, FiO2 of 30%, PEEP of 5, TI time of 1.1  Continue with the ventilator at this time as the patient did have a severe respiratory failure that led him in the hospitals, probably secondary to the fact that he was not able to take a full deep breath and became encephalopathic because of septic shock        Gastrointestinal   Pancreatic cancer    CT abdomen shows cholecystitis  General surgery has been consulted,    ERICKA drain in place and draining a purulent greenish material  Draining 50 ml/hr    I suspect that the patient is septic b/c of the cholecystitis  Pt is not improving  Will defer to Surgery about plan    Patient carries a very high risk postoperatively if this is a decision which has been talked to with surgery and the family however at this point the patient's situation is not improving. Liver function is stable, bilirubin is elevated but not increasing.   Lipase is normal  Lactic acid is normal    Maxipime  Protonix 40 mg daily    Endo  Blood sugars are controlled  TSH is normal     Renal  Renal insufficiency  Nephrology has been consulted  Creatinine holding at 1.7  Vas-Cath placed 12/7/2021  On CRRT      Prophylaxis  Heparin 5000 every 8 hours  Protonix 40 mg daily      Lines  [place]  Right IJ Vas-Cath placed 12/7/2021  OG  R [place] radial 12/87/21  ET tube placed 12/7/2021    Code Status: Full code          Infectious disease  Septic shock  On pressors    Culture growing  Culture, Anaerobic and Aerobic [[id number]] (Abnormal)  Collected: 12/06/21 1830   Order Status: Completed Specimen: Body Fluid from Gallbladder Updated: 12/08/21 1833    Gram Stain Result 1+ WBC's (Polymorphonuclear) present   2+ Gram positive cocci    Abnormal     Anaerobic Culture --    Anaerobic culture further report to follow   No anaerobes isolated so far, Further report to follow     Organism Escherichia coli Abnormal     WOUND/ABSCESS Moderate growth   Narrative:     ORDER#: [id number]                          ORDERED BY: ANIVAL BROCK        Continue with  Merrem       Electrolytes   Sodium persistently staying low at 133, will follow  Creatinine coming down to 1.7      Discussed at multidisciplinary rounds, with dietitian, pharmacy, nursing      We will discuss with family about possible withdrawal of care          Critical Care Services Provided: This patient had a critical illness or injury that acutely impaired one or more vital organ systems such that there was a high probability of imminent or life-threatening deterioration in the patient's condition. This required high complexity decision-making to assess, manipulate, and support vital system function(s) to treat single or multiple vital organ system failure and/or to prevent further life-threatening deterioration of the patient's condition. Total attending physician time, excluding unbundled procedures, spent at the bedside, and away from the bedside but on the unit or floor, reviewing laboratory test results, discussing care with medical staff, and discussing care with family when the patient was unable or incompetent to participate:   Critical Care Time (Minutes) # 35   (Discontinuous)           Josemanuel Rainey MD  [facility]  Pulmonary, Critical Care and Sleep Medicine  Office : [phone]    Please note that some or all of this record was generated using voice recognition software.  If there are any questions about the content of this document, please contact the [facility] as some errors in transcription may have occurred.

## 2021-12-10 NOTE — PROGRESS NOTES
12/10/21 0006   Vent Information   Vent Type 980   Vent Mode AC/VC+   Vt Ordered 500 mL   Rate Set 22 bmp   Pressure Support 0 cmH20   FiO2  40 %   Sensitivity 3   PEEP/CPAP 5   I Time/ I Time % 0.95 s   Humidification Source HME   Vent Patient Data   Peak Inspiratory Pressure 28 cmH2O   Mean Airway Pressure 13 cmH20   Rate Measured 22 br/min   Vt Exhaled 499 mL   Minute Volume 11.3 Liters   I:E Ratio 1:1.90   Cough/Sputum   Sputum How Obtained Endotracheal   Cough Non-productive   Sputum Amount Small   Sputum Color Cloudy   Tenacity Thick   Spontaneous Breathing Trial (SBT) RT Doc   Pulse 53   Breath Sounds   Right Upper Lobe Clear   Right Middle Lobe Diminished   Right Lower Lobe Diminished   Left Upper Lobe Clear   Left Lower Lobe Diminished   Additional Respiratory  Assessments   Resp 22   Position Semi-Caldwell's   Cuff Pressure (cm H2O) 30 cm H2O   Alarm Settings   High Pressure Alarm 45 cmH2O   Low Minute Volume Alarm 2 L/min   High Respiratory Rate 40 br/min   ETT (adult)   Placement Date/Time: 12/07/21 1405   Preoxygenation: Yes  Mask Ventilation: Ventilated by mask (1)  Technique: Rapid sequence; Video laryngoscopy  Type: Cuffed  Tube Size: 8 mm  Laryngoscope: GlideScope  Blade Size: 4  Location: Oral  Insertion attemp. ..    Secured at 26 cm   Measured From [address] [place] [address],[address] By [employer] tube saeed   Site Condition Dry

## 2021-12-10 NOTE — PROGRESS NOTES
12/10/21 0408   Vent Information   Vent Type 980   Vent Mode AC/VC+   Vt Ordered 500 mL   Rate Set 22 bmp   Pressure Support 0 cmH20   FiO2  30 %   Sensitivity 3   PEEP/CPAP 5   I Time/ I Time % 0.95 s   Humidification Source HME   Vent Patient Data   Peak Inspiratory Pressure 30 cmH2O   Mean Airway Pressure 14 cmH20   Rate Measured 22 br/min   Vt Exhaled 538 mL   Minute Volume 11.5 Liters   I:E Ratio 1:1.90   Cough/Sputum   Sputum How Obtained None   Spontaneous Breathing Trial (SBT) RT Doc   Pulse (!) 49   Breath Sounds   Right Upper Lobe Clear   Right Middle Lobe Diminished   Right Lower Lobe Diminished   Left Upper Lobe Clear   Left Lower Lobe Diminished   Additional Respiratory  Assessments   Resp 22   Position Semi-Caldwell's   Cuff Pressure (cm H2O) 30 cm H2O   Alarm Settings   High Pressure Alarm 45 cmH2O   Low Minute Volume Alarm 2 L/min   High Respiratory Rate 40 br/min   ETT (adult)   Placement Date/Time: 12/07/21 1405   Preoxygenation: Yes  Mask Ventilation: Ventilated by mask (1)  Technique: Rapid sequence; Video laryngoscopy  Type: Cuffed  Tube Size: 8 mm  Laryngoscope: GlideScope  Blade Size: 4  Location: Oral  Insertion attemp. ..    Secured at 26 cm   Measured From Lips   ET Placement Left   Secured By [employer] tube saeed   Site Condition Dry

## 2021-12-10 NOTE — PLAN OF CARE
Problem: Nutrition  Goal: Optimal nutrition therapy  Outcome: Ongoing  Note: Nutrition Problem #1: Inadequate energy intake  Intervention: Food and/or Nutrient Delivery: Continue NPO (TFs vs. TPN)  Nutritional Goals:  Tolerate TFs at goal rate this admission w/o GI distress

## 2021-12-10 NOTE — PROGRESS NOTES
Hospitalist Progress Note      PCP: Moe Jeter MD    Date of Admission: 12/4/2021    Chief Complaint: Generalized weakness    Hospital Course:   Deangelo Sims a 78 y. o. male w/ hx of COPD on 4L NC as needed, HTN, HLD, afib, and neuroendocrine pancreatic cancer who presents with shortness of breath and fatigue.  Reports a few days before Thanksgiving he began feeling tired, feverish, and short of breath.  He took some Tylenol which did help with the fever.  Since Thanksgiving he has continued to feel weak, rundown, nauseous, SOB, had loss of appetite and diarrhea.  He feels the weakness is worse in his legs.  He denies any vomiting but there is evidence of dark brown emesis in oral cavity, on chest, and on the front of his shirt. He  complaint right upper quadrant pain in the ER      Subjective: Intubated and sedated on Ventilator. WBC remains elevated at 47 -37 post drain insertion. Drain patent and draining well.   -Labs: Cr 1.7, HCO3 24 on CRRT. Nephrology plan to maintain even fluid balance. General Surgery watchful waiting for GB and need for potential surgery. Surgery hesitant to take pt to OR, patient is thought to be a high risk candidate. Patient persistent with hypotension and requiring increased support to BP.    -Levophed and addition of Vasopressin  -Noting on physical exam, mottling to feet R>L. Close monitoring by Nursing staff. Plan: ICU Team discussing with family future goals of care. Family Cods Status updated to [facility] [place] and considering withdrawal of care due to critical nature of patient and poor prognosis. Bedside rounding with nursing completed. No new medical complaints.        Medications:  Reviewed    Infusion Medications    heparin 5000 units CRRT syringe 2 mL/hr at 12/08/21 2226    vasopressin (Septic Shock) infusion 0.03 Units/min (12/09/21 1206)    IV infusion builder Stopped (12/07/21 1700)    prismaSATE BGK 4/2.5 500 mL/hr at 12/10/21 0144    prismaSATE BGK 4/2.5 250 mL/hr at 12/09/21 1527    prismaSATE BGK 4/2.5 2,000 mL/hr at 12/10/21 0647    fentaNYL (SUBLIMAZE) infusion 150 mcg/hr (12/10/21 2854)    midazolam 5 mg/hr (12/09/21 3739)    dextrose      norepinephrine 26 mcg/min (12/10/21 0123)    sodium chloride       Scheduled Medications    meropenem  1,000 mg IntraVENous Q12H    chlorhexidine  15 mL Mouth/Throat BID    carboxymethylcellulose PF  1 drop Both Eyes 6 times per day    influenza virus vaccine  0.5 mL IntraMUSCular Prior to discharge    aspirin  81 mg Oral Daily    atorvastatin  40 mg Oral Nightly    sodium chloride flush  5-40 mL IntraVENous 2 times per day    [Held by provider] heparin (porcine)  5,000 Units SubCUTAneous 3 times per day    pantoprazole  40 mg IntraVENous Daily     PRN Meds: magnesium sulfate, sodium phosphate IVPB **OR** sodium phosphate IVPB **OR** sodium phosphate IVPB **OR** sodium phosphate IVPB, fentanNYL, calcium gluconate **OR** calcium gluconate **OR** calcium gluconate **OR** calcium gluconate, dextrose, glucose, dextrose, glucagon (rDNA), dextrose, sodium chloride flush, sodium chloride, acetaminophen **OR** acetaminophen, ipratropium-albuterol      Intake/Output Summary (Last 24 hours) at 12/10/2021 0849  Last data filed at 12/9/2021 1600  Gross per 24 hour   Intake    Output 18 ml   Net -18 ml       Physical Exam Performed:    BP 99/64   Pulse (!) 48   Temp 97.5 °F (36.4 °C) (Bladder)   Resp 22   Ht 6' 2\" (1.88 m)   Wt 271 lb 1.2 oz (123 kg)   SpO2 96%   BMI 34.80 kg/m²     General appearance: Intubated Sedated  HEENT: Pupils equal, round, and reactive to light. Conjunctivae/corneas clear. Neck: Supple, with full range of motion. No jugular venous distention. Trachea midline. Respiratory:  Normal respiratory effort. CTA bilat to anterior fields. Synchronous with vent. Cardiovascular: Regular rate and rhythm with normal S1/S2 without murmurs, rubs or gallops.   Abdomen: Soft, non-tender, non-distended with normal bowel sounds. Obese, protuberant. No guarding or rebound. Drain intact: Draining bilious material.   Musculoskeletal: No clubbing, cyanosis or edema bilaterally. Feet mottled Bilat. R/L. Discoloration of toes noted. Distal pulses present to DP/PT. Poor toenail care. Skin: Skin color, texture, turgor normal.  No rashes or lesions. Neurologic:  Unable to assess: Intubated and sedated. Capillary Refill: Brisk,3 seconds, normal   Peripheral Pulses: +2 palpable, equal bilaterally       Labs:   Recent Labs     12/08/21  0540 12/09/21  0557 12/10/21  0611   WBC 47.0* 37.1* 37.8*   HGB 12.5* 12.3* 12.4*   HCT 37.6* 36.5* 37.1*    176 148     Recent Labs     12/09/21  1502 12/09/21  2345 12/10/21  0611   * 131* 133*   K 4.1 4.2 4.2   CL 99 97* 100   CO2 23 23 24   BUN 46* 38* 32*   CREATININE 2.0* 1.8* 1.7*   CALCIUM 8.7 8.6 8.6   PHOS 3.1 3.1 2.8     Recent Labs     12/08/21  0540   AST 23   ALT 16   BILIDIR 1.0*   BILITOT 1.8*   ALKPHOS 238*     Recent Labs     12/07/21  1048 12/09/21  1018   INR 1.75* 1.73*     No results for input(s): CKTOTAL, TROPONINI in the last 72 hours. Urinalysis:      Lab Results   Component Value Date    NITRU Negative 12/04/2021    WBCUA 3-5 12/04/2021    BACTERIA Rare 12/04/2021    RBCUA 3-4 12/04/2021    BLOODU Negative 12/04/2021    SPECGRAV >=1.030 12/04/2021    GLUCOSEU Negative 12/04/2021       Radiology:  XR CHEST PORTABLE   Final Result   Relatively stable appearance of the chest.  Perihilar and bibasilar airspace   opacities with small pleural effusions. XR ABDOMEN (KUB) (SINGLE AP VIEW)   Final Result      1. NG tube in place with the tip in the fundus of the stomach and the side   port at or just distal to the GE junction. 2.  What can be seen of the bowel gas pattern appears nonspecific. XR CHEST PORTABLE   Final Result   Linear areas of platelike atelectasis in both lungs.   Various support tubes and catheters are in good position without evidence of complication. IR NONTUNNELED VASCULAR CATHETER > 5 YEARS   Final Result   Successful ultrasound guided non-tunneled dialysis catheter placement. US ABSCESS DRAINAGE PERITONEAL/RETROPERITONEAL PERC   Final Result   Technically successful CT and ultrasound-guided 10 [place]ese percutaneous   cholecystostomy tube placement. CT [facility] CHOLECYSTOSTOMY   Final Result   Technically successful CT and ultrasound-guided 10 Burmese percutaneous   cholecystostomy tube placement. CT GUIDED NEEDLE PLACEMENT   Final Result   Technically successful CT and ultrasound-guided 10 Burmese percutaneous   cholecystostomy tube placement. XR CHEST PORTABLE   Final Result   Right IJ central venous catheter with catheter tip cavoatrial junction. Bibasilar atelectasis/pneumonitis. Pulmonary vascular congestion. US GALLBLADDER RUQ   Final Result   Cholelithiasis with a significant amount of echogenic material in the   gallbladder suggesting biliary sludge. The gallbladder is dilated. 6 cm cyst inferiorly in the right kidney         CT ABDOMEN PELVIS WO CONTRAST Additional Contrast? None   Final Result   1. Cholelithiasis with suggestion of gallbladder wall thickening. If patient   has right upper quadrant abdominal pain, ultrasound would better assess   gallbladder. 2. Right lower lobe and left lobe airspace consolidations in the lungs. Please correlate with clinical symptoms of pneumonia. 3. Nodular liver contour and splenomegaly. Please correlate with clinical   history of cirrhosis and portal hypertension. 4. Small volume of ascites in the pelvis and along the mesentery. 5. Sigmoid diverticulosis. No evidence of diverticulitis. 6. Nonobstructing left nephrolithiasis. 7. Bilateral exophytic renal cortical masses, including suspected   hemorrhagic/proteinaceous cyst  arising from the left lower renal pole.    Additional hypodensities are not adequately assessed on this noncontrast exam   and would better be assessed on a nonemergent basis with MR per renal mass   protocol. XR CHEST PORTABLE   Final Result   Mild right basilar atelectasis. Assessment/Plan:    Active Hospital Problems    Diagnosis     Dyspnea [R06.00]     REJI (acute kidney injury) ([facility] [address] [N17.9]     Pulmonary infiltrates [R91.8]     Normal anion gap metabolic acidosis [Y07.6]     Leukocytosis [D72.829]     Elevated alkaline phosphatase level [R74.8]     Calculus of gallbladder with acute cholecystitis without obstruction [K80.00]     Splenomegaly [R16.1]     Macrocytosis [D75.89]     Cholecystitis [K81.9]     Shock, septic ([facility] [address] [A41.9, R65.21]      Septic Shock with worsening leukocytosis  -Secondary to acute cholecystitis. Pulmonary infiltrates seen on CT Abd, CXR wihtout acute ASD. - Transfer to ICU and started on IV Levophed with CRRT. Patient was previously titrated off on 12/5 HS. Now requiring Levo/Vaso  -Micro: Blood Clx: NGTD, UA bland,   - Abx:  Merrem, Vanco ( DC on 12/7 due to worsening REJI)   - Consult General Surg:  IR to place percutaneous cholecysostomy tube 12/6. Biliary drainage to be collected by IR at T tube insertion. Watchful waiting for potential surgery. Hypothermia:   -Secondary to sepsis and infection  -Nursing instructed to verify temp rectally  -Joey Hugger for Temperature stability  -Continue to treat infection as outlined above. Acute kidney injury   -Secondary to hypotension, hypovolemia, infection, and Motrin/Vanco  -Vas cath placed and started on CRRT  - continue with IV hydration   - Labs: check daily BMP  -Avoid unnecessary nephrotoxins. -Judicious use of Vanco and taper with ID Micro culture data  - Consult nephrology: Bilat Renal masses will need MRI FU once pt stable.      Metabolic Acidosis:   -Secondary to REJI/poor renal function  -IV Bicarb gtt  -Labs: BMP monitoring    Essential Hypertension and HLD without CAD  - Hypotensive/Shock on POA  - Meds:  antihypertensive medication on hold due to hypotension. Restart Lipitor  -Cont to optimize BP and Cholesterol as outpt     Macrocytic Anemia  -Stable  -Labs: B12, Folate in range  -Iron labs ordered  -Continue to follow    History of a neuroendocrine pancreatic cancer with episodes of hypoglycemia  -Stable  -Patient will need a glucose supply. TF to be initiated. -Cont to FU with outpt Oncologist.     DVT Prophylaxis: Heparin subcu  Diet: Diet NPO Exceptions are: Ice Chips, Sips of Water with Meds  ADULT TUBE FEEDING; Nasogastric; Peptide Based High Protein; Continuous; 10; No; 60; Q 4 hours; Protein; 2 bottles of proteinex daily. 30 mL free water flush before and after each administration  Code Status: Full Code    PT/OT Eval Status: On hold in ICU    Dispo - Expect 2-3 days pending clinical response to medical therapy.      Sherri Senior MD

## 2021-12-10 NOTE — PROGRESS NOTES
12/09/21 2001   Vent Information   Equipment Changed HME   Vent Type 980   Vent Mode AC/VC+   Vt Ordered 500 mL   Rate Set 22 bmp   Pressure Support 0 cmH20   FiO2  40 %   Sensitivity 3   PEEP/CPAP 5   I Time/ I Time % 0.95 s   Vent Patient Data   Peak Inspiratory Pressure 27 cmH2O   Mean Airway Pressure 13 cmH20   Rate Measured 22 br/min   Vt Exhaled 506 mL   Minute Volume 11.2 Liters   I:E Ratio 1:1.90   Cough/Sputum   Sputum How Obtained Oral; Endotracheal; Suctioned   Cough Non-productive   Sputum Amount None   Spontaneous Breathing Trial (SBT) RT Doc   Pulse (!) 47   Breath Sounds   Right Upper Lobe Clear; Diminished   Right Middle Lobe Diminished   Right Lower Lobe Clear; Diminished   Left Upper Lobe Diminished   Left Lower Lobe Diminished   Additional Respiratory  Assessments   Resp 22   Oral Care Mouth suctioned   Cuff Pressure (cm H2O) 30 cm H2O   Alarm Settings   High Pressure Alarm 45 cmH2O   Low Minute Volume Alarm 2 L/min   High Respiratory Rate 40 br/min   Low Exhaled Vt  200 mL   Patient Observation   Observations AMBU BAG AND MASK AT BEDSIDE, ALARMS ON AND AUDIBLE, APNEA PARAMETERS ON   ETT (adult)   Placement Date/Time: 12/07/21 1405   Preoxygenation: Yes  Mask Ventilation: Ventilated by mask (1)  Technique: Rapid sequence; Video laryngoscopy  Type: Cuffed  Tube Size: 8 mm  Laryngoscope: GlideScope  Blade Size: 4  Location: Oral  Insertion attemp. ..    Secured at 26 cm   Measured From [address] [place] [address],[address] By Commercial tube saeed   Site Condition Dry   Cuff Pressure 30 cm H2O

## 2021-12-10 NOTE — PROGRESS NOTES
Spoke with Patel Herron (poa) they have decided to withdraw care on pt. Pamela Blancas (Wife) also on the phone in aggr[place]. They did not want to be bedside. The would like to know when the pt has passed away.

## 2021-12-10 NOTE — CARE COORDINATION
Care being managed by [facility] Surg, Neph. LOS 6. On Vent FIO2 30%, PEEP 5. CRRT. On pressors. Following for needs.   Aurora Ott RN

## 2021-12-10 NOTE — PROGRESS NOTES
12/10/21 0858   Vent Information   Vent Type 980   Vent Mode AC/VC+   Vt Ordered 500 mL   Rate Set 22 bmp   Pressure Support 0 cmH20   FiO2  30 %   Sensitivity 3   PEEP/CPAP 5   I Time/ I Time % 0.95 s   Humidification Source HME   Vent Patient Data   Peak Inspiratory Pressure 26 cmH2O   Mean Airway Pressure 13 cmH20   Rate Measured 22 br/min   Vt Exhaled 516 mL   Minute Volume 11.3 Liters   I:E Ratio 1:1.90   Cough/Sputum   Sputum How Obtained Suctioned; Endotracheal   Cough None   Sputum Amount Small   Sputum Color Cloudy   Tenacity Thin   Spontaneous Breathing Trial (SBT) RT Doc   Pulse 50   Breath Sounds   Right Upper Lobe Diminished   Right Middle Lobe Diminished   Right Lower Lobe Diminished   Left Upper Lobe Diminished   Left Lower Lobe Diminished   Additional Respiratory  Assessments   Resp 22   Cuff Pressure (cm H2O) 30 cm H2O   Alarm Settings   High Pressure Alarm 45 cmH2O   Low Minute Volume Alarm 2 L/min   High Respiratory Rate 40 br/min   Low Exhaled Vt  200 mL   ETT (adult)   Placement Date/Time: 12/07/21 1405   Preoxygenation: Yes  Mask Ventilation: Ventilated by mask (1)  Technique: Rapid sequence; Video laryngoscopy  Type: Cuffed  Tube Size: 8 mm  Laryngoscope: GlideScope  Blade Size: 4  Location: Oral  Insertion attemp. ..    Secured at 26 cm   Measured From Lips   ET Placement Right   Secured By Commercial tube saeed

## 2021-12-10 NOTE — PROGRESS NOTES
[facility] Progress Note           Subjective/   78y.o. year old male who we are seeing in consultation for REJI requiring HD. HPI:  Renal function slowly worsening, started HD on 12/7 c/b respiratory arrest, now on ventilator. Transitioned to CRRT on 12/7. Seen on CRRT. Orders confirmed. Hypotensive, remains on pressors. ROS:  Remains on ventilator. Objective/   GEN:  Chronically ill, BP 99/64   Pulse 50   Temp 97.5 °F (36.4 °C) (Bladder)   Resp 22   Ht 6' 2\" (1.88 m)   Wt 271 lb 1.2 oz (123 kg)   SpO2 96%   BMI 34.80 kg/m²   HEENT: non-icteric, no JVD  CV: S1, S2 without m/r/g; no LE edema  RESP: CTA B without w/r/r; mechanically ventilated  ABD: decreased bs, soft, distended, tender, perc marylin  SKIN: warm, no rashes  ACCESS: R IJ vascath (12/7)    Data/  Recent Labs     12/08/21  0540 12/09/21  0557 12/10/21  0611   WBC 47.0* 37.1* 37.8*   HGB 12.5* 12.3* 12.4*   HCT 37.6* 36.5* 37.1*   .8* 108.4* 109.3*    176 148     Recent Labs     12/09/21  0557 12/09/21  0557 12/09/21  1502 12/09/21  2345 12/10/21  0611   *   < > 132* 131* 133*   K 4.0   < > 4.1 4.2 4.2      < > 99 97* 100   CO2 21   < > 23 23 24   GLUCOSE 125*   < > 121* 116* 111*   PHOS 3.7   < > 3.1 3.1 2.8   MG 2.10  --  2.20 2.30  --    BUN 59*   < > 46* 38* 32*   CREATININE 2.4*   < > 2.0* 1.8* 1.7*   LABGLOM 27*   < > 33* 37* 40*   GFRAA 32*   < > 40* 45* 48*    < > = values in this interval not displayed. Assessment/     -REJI in the setting of septic shock from acute cholecystitis and use of motrin.     SCr up to mid 4 range   UA bland and no obstruction noted   Started HD, CRRT since 12/7     -Septic shock              Likely from acute cholecystitis, E coli from cultures    -Acute metabolic encephalopathy    - Acute hypoxic, hypercarbic respiratory failure - on ventilator    -Hyponatremia     -Metabolic acidosis     -Bilateral renal cortical masses              Will need MRI at a later date    Plan/     - Continue CRRT with even fluid balance  - Continue pressors to maintain MAP of 65  - Trend labs, bp's, & urine output    Case d/w ICU team    ____________________________________  Amina Blakely MD  [facility]  [email or web address]  Office: [phone]

## 2021-12-11 LAB
ANAEROBIC CULTURE: ABNORMAL
GRAM STAIN RESULT: ABNORMAL
ORGANISM: ABNORMAL
WOUND/ABSCESS: ABNORMAL

## 2021-12-30 NOTE — DISCHARGE SUMMARY
Hospital Medicine Discharge Summary    Patient ID: Josefina Briceno      Patient's PCP: Bety Goff MD    Admitting Physician: Pallavi Hoover MD  Discharge Physician: Farhad Tejada MD     Admit Date: 2021     Disposition:     Discharge Diagnoses: Active Hospital Problems    Diagnosis     Dyspnea [R06.00]     REJI (acute kidney injury) ([facility] [address] [N17.9]     Pulmonary infiltrates [R91.8]     Normal anion gap metabolic acidosis [L29.5]     Leukocytosis [D72.829]     Elevated alkaline phosphatase level [R74.8]     Calculus of gallbladder with acute cholecystitis without obstruction [K80.00]     Splenomegaly [R16.1]     Macrocytosis [D75.89]     Cholecystitis [K81.9]     Shock, septic ([facility] [address] [A41.9, R65.21]        Date of Death:12/10/2021  Time of Death:1639    Immediate Cause of Death:Acute Cholecystitis  Underlying Conditions:Septic Shock, Pneumonia, REJI  Other Contributing Conditions:Metabollic acidosis. Neuroendocrine pancreatic cancer    Hospital Course:   Naty Louie a 78 y. o. male w/ hx of COPD on 4L NC as needed, HTN, HLD, afib, and neuroendocrine pancreatic cancer who presented with shortness of breath and fatigue.  Patient reports a few days before Thanksgi he began feeling tired, feverish, and short of breath.  He took some Tylenol which did help with the fever.  Since Thanks, he has continued to feel weak, rundown, nauseous, SOB, had loss of appetite with diarrhea.  He feels the weakness is worse in his legs and he has been unable to get around. Eden Spangler denies any vomiting, but there is evidence of dark brown emesis in oral cavity, on chest, and on the front of his shirt when examined in the ED. He continued with complaints of right upper quadrant pain in the ER in addition.      Patient overall condition continued to deteriorate in the ED, and patient was intubated and placed to the ICU.         Assessment/Plan:          Active Hospital Problems     Diagnosis      Dyspnea [R06.00]      REJI (acute kidney injury) ([facility] [address] [N17.9]      Pulmonary infiltrates [R91.8]      Normal anion gap metabolic acidosis [M79.2]      Leukocytosis [D72.829]      Elevated alkaline phosphatase level [R74.8]      Calculus of gallbladder with acute cholecystitis without obstruction [K80.00]      Splenomegaly [R16.1]      Macrocytosis [D75.89]      Cholecystitis [K81.9]      Shock, septic (HCC) [A41.9, R65.21]        Septic Shock with worsening leukocytosis  -Secondary to acute cholecystitis. Pulmonary infiltrates seen on CT Abd, CXR wihtout acute ASD. - Transfer to ICU and started on IV Levophed with CRRT. Patient was previously titrated off on 12/5 HS. Now requiring Levo/Vaso  -Micro: Blood Clx: NGTD, UA bland,   - Abx:  Merrem, Vanco ( DC on 12/7 due to worsening REJI)   - Consult General Surg:  IR to place percutaneous cholecysostomy tube 12/6. Biliary drainage to be collected by IR at T tube insertion. Watchful waiting for potential surgery if patient could be stable to withstand surgery. ,  General Surgery Following.      Hypothermia:   -Secondary to sepsis and infection  -Nursing instructed to verify temp rectally  -Joey Tidwell for Temperature stability  -Continue to treat infection as outlined above.      Acute kidney injury   -Secondary to hypotension, hypovolemia, infection, and Motrin/Vanco  -Vas cath placed and started on CRRT  - continue with IV hydration   - Labs: check daily BMP  -Avoid unnecessary nephrotoxins. -Judicious use of Vanco and taper with ID Micro culture data  - Consult nephrology: Appreciate recommendations.      Metabolic Acidosis:   -Secondary to REJI/poor renal function  -IV Bicarb gtt  -Labs: BMP monitoring     Essential Hypertension and HLD without CAD  - Hypotensive/Shock on POA  - Meds:  antihypertensive medication on hold due to hypotension.   Restart Lipitor  -Cont to optimize BP and Cholesterol     Macrocytic Anemia  -Stable  -Labs: B12, Folate in range  -Iron labs ordered  -Continue to follow     History of a neuroendocrine pancreatic cancer with episodes of hypoglycemia  -Stable  -Patient will need a glucose supply. TF to be initiated. -Cont to FU with outpt Oncologist.     Despite diagnostic and therapuetic modalities, patient continued to deteriorate and not respond. Ultimately family was contacted by ICU/Critical care team and advance directives were discussed. Please refer to ANP notes. Family final decision to pursue terminal extubation on 12/10/2021. Consults:  IP CONSULT TO GENERAL SURGERY  IP CONSULT TO HOSPITALIST  IP CONSULT TO PHARMACY  IP CONSULT TO CRITICAL CARE  IP CONSULT TO NEPHROLOGY  IP CONSULT TO RADIOLOGY  IP CONSULT TO DIETITIAN    Signed:  Negrita Snow MD MD   12/30/2021    Thank you Allison Chua MD for the opportunity to be involved in this patient's care. If you have any questions or concerns please feel free to contact me at [phone].

## 2022-01-27 NOTE — CARE COORDINATION
CASE MANAGEMENT INITIAL ASSESSMENT      Reviewed chart and completed assessment with patient's daughter and Lauren KWONG  Explained Case Management role/services. Primary contact information:see below    Health Care Decision Maker :   Primary Decision Maker: Scar Lynch - [phone]          Can this person be reached and be able to respond quickly, such as within a few minutes or hours? Yes    Admit date/status:12/04/2021  Diagnosis:septic shock   Is this a Readmission?:  No      Insurance:VACCN   Precert required for SNF: Yes       3 night stay required: No    Living arrangements, Adls, care needs, prior to admission:IPTA, lives w/spouse in a ranch home, stair lift to [facility], 1st floor master, cares for spouse who is confused at times per daughter    Manju Chavira at home:  Walker__Cane_x_RTS__ BSC__Shower Chair_x_  02__ HHN__ CPAP__  BiPap__  Hospital Bed__ W/C___ Other__________    Services in the home and/or outpatient, prior to admission:aides  2 days/week for spouse    Dialysis Facility (if applicable)   · Name:  · Address:  · Dialysis Schedule:  · Phone:  · Fax:    PT/OT recs:not ordered at this time    Alba [address] Notification (HEN):not initiated    Barriers to discharge:none    Plan/comments:CM spoke to daughter via phone who states that pt is [place], family drives. Aides come 2 day per week to assist w/spouse who pt cares for. Pt currently being prepped for dialysis, uncertain of how long pt will need at this time. CM will continue to follow for needs, including possible SNF when pt is stable.   Tiara Zarate RN       [facility] on chart for MD signature oral

## 2022-08-05 NOTE — PROGRESS NOTES
Hospitalist Progress Note      PCP: Avelina Rojas MD    Date of Admission: 12/4/2021    Chief Complaint: Generalized weakness    Hospital Course:   Moises Santiago a 78 y. o. male w/ hx of COPD on 4L NC as needed, HTN, HLD, afib, and neuroendocrine pancreatic cancer who presents with shortness of breath and fatigue.  Reports a few days before Thanksgiving he began feeling tired, feverish, and short of breath.  He took some Tylenol which did help with the fever.  Since Thanksgiving he has continued to feel weak, rundown, nauseous, SOB, had loss of appetite and diarrhea.  He feels the weakness is worse in his legs.  He denies any vomiting but there is evidence of dark brown emesis in oral cavity, on chest, and on the front of his shirt. He  complaint right upper quadrant pain in the ER      Subjective: Increased confusion and poor verbal response this AM.  WBC remains elevated at 36 post drain insertion. General Surgery at bedside to evaluate. Drain patent and draining well.   -Labs: Cr 4.3, HCO3 19 on Bicarb gtt. Nephrology at bedside and initiating Vas Cath and HD. Family contacted by Renal and decision to proceed with HD. Addendum: Rapid Response 1:45 PM  Patient while at HD became unresponsive with agonal breathing. HR 70-75, NSR.  /75. Patient obtunded and not responsive to verbal or tactile commands. RR gasp at 5-6. Pulmonary Critical Care Team called to bedside for Emergent intubation. Patient was successfully intubated by NP Tony Moreland. Plan: Transfer to ICU Floor and CRRT    Bedside rounding with nursing completed. No new medical complaints.        Medications:  Reviewed    Infusion Medications    dextrose      IV infusion builder 100 mL/hr at 12/06/21 2337    norepinephrine Stopped (12/05/21 1130)    sodium chloride      norepinephrine Stopped (12/04/21 1900)     Scheduled Medications    vancomycin  500 mg IntraVENous Q12H    influenza virus vaccine  0.5 mL IntraMUSCular Prior to discharge    aspirin  81 mg Oral Daily    atorvastatin  40 mg Oral Nightly    sodium chloride flush  5-40 mL IntraVENous 2 times per day    heparin (porcine)  5,000 Units SubCUTAneous 3 times per day    cefepime  1,000 mg IntraVENous Q12H    metroNIDAZOLE  500 mg IntraVENous Q8H    pantoprazole  40 mg IntraVENous Daily    vancomycin (VANCOCIN) intermittent dosing (placeholder)   Other RX Placeholder     PRN Meds: dextrose, glucose, dextrose, glucagon (rDNA), dextrose, sodium chloride flush, sodium chloride, acetaminophen **OR** acetaminophen, ipratropium-albuterol      Intake/Output Summary (Last 24 hours) at 12/7/2021 0713  Last data filed at 12/7/2021 0136  Gross per 24 hour   Intake 3538.97 ml   Output 435 ml   Net 3103.97 ml       Physical Exam Performed:    /65   Pulse 89   Temp 96.2 °F (35.7 °C) (Rectal)   Resp 20   Ht 6' 2\" (1.88 m)   Wt 262 lb 6.4 oz (119 kg)   SpO2 95%   BMI 33.69 kg/m²     General appearance: No apparent distress, appears stated age and cooperative. Pleasant and confused. More lethargic this AM. Not able to answer all questions appropriately. HEENT: Pupils equal, round, and reactive to light. Conjunctivae/corneas clear. Neck: Supple, with full range of motion. No jugular venous distention. Trachea midline. Respiratory:  Normal respiratory effort. Decreased breath sound. Poor aeration to bases bilat. Poor diaphragmatic excursion. Easy and non labored breathing. Cardiovascular: Regular rate and rhythm with normal S1/S2 without murmurs, rubs or gallops. Abdomen: Soft, non-tender, non-distended with normal bowel sounds. Obese, protuberant. No guarding or rebound. Musculoskeletal: No clubbing, cyanosis or edema bilaterally. Full range of motion without deformity. Skin: Skin color, texture, turgor normal.  No rashes or lesions. Neurologic:  Neurovascularly intact without any focal sensory/motor deficits.  Cranial nerves: II-XII intact, grossly non-focal.  Psychiatric: Confused and unable to answer questions. Required verbal redirection. Capillary Refill: Brisk,3 seconds, normal   Peripheral Pulses: +2 palpable, equal bilaterally       Labs:   Recent Labs     12/05/21  0857 12/06/21  0923 12/07/21  0621   WBC 31.8* 36.0* 36.7*   HGB 12.9* 12.6* 12.0*   HCT 39.4* 38.0* 37.5*    286 268     Recent Labs     12/05/21  0857 12/05/21  0857 12/05/21  1841 12/06/21  0923 12/07/21  0621   *   < > 134* 136 136   K 4.1   < > 4.0 3.9 4.0      < > 102 102 101   CO2 18*   < > 18* 19* 19*   BUN 97*   < > 101* 109* 115*   CREATININE 3.6*   < > 3.5* 3.6* 4.3*   CALCIUM 7.9*   < > 8.0* 7.9* 6.9*   PHOS 5.9*  --   --   --  8.4*    < > = values in this interval not displayed. Recent Labs     12/04/21  1350 12/05/21  1000 12/06/21  0535   AST 27 26 28   ALT 26 23 22   BILIDIR  --  1.3* 1.1*   BILITOT 2.3* 2.0* 1.9*   ALKPHOS 311* 313* 292*     Recent Labs     12/05/21  1000   INR 1.45*     Recent Labs     12/04/21  1350   TROPONINI 0.01       Urinalysis:      Lab Results   Component Value Date    NITRU Negative 12/04/2021    WBCUA 3-5 12/04/2021    BACTERIA Rare 12/04/2021    RBCUA 3-4 12/04/2021    BLOODU Negative 12/04/2021    SPECGRAV >=1.030 12/04/2021    GLUCOSEU Negative 12/04/2021       Radiology:  XR CHEST PORTABLE   Final Result   Right IJ central venous catheter with catheter tip cavoatrial junction. Bibasilar atelectasis/pneumonitis. Pulmonary vascular congestion. US GALLBLADDER RUQ   Final Result   Cholelithiasis with a significant amount of echogenic material in the   gallbladder suggesting biliary sludge. The gallbladder is dilated. 6 cm cyst inferiorly in the right kidney         CT ABDOMEN PELVIS WO CONTRAST Additional Contrast? None   Final Result   1. Cholelithiasis with suggestion of gallbladder wall thickening.   If patient   has right upper quadrant abdominal pain, ultrasound would better assess gallbladder. 2. Right lower lobe and left lobe airspace consolidations in the lungs. Please correlate with clinical symptoms of pneumonia. 3. Nodular liver contour and splenomegaly. Please correlate with clinical   history of cirrhosis and portal hypertension. 4. Small volume of ascites in the pelvis and along the mesentery. 5. Sigmoid diverticulosis. No evidence of diverticulitis. 6. Nonobstructing left nephrolithiasis. 7. Bilateral exophytic renal cortical masses, including suspected   hemorrhagic/proteinaceous cyst  arising from the left lower renal pole. Additional hypodensities are not adequately assessed on this noncontrast exam   and would better be assessed on a nonemergent basis with MR per renal mass   protocol. XR CHEST PORTABLE   Final Result   Mild right basilar atelectasis. US ABSCESS DRAINAGE PERITONEAL/RETROPERITONEAL PERC    (Results Pending)   CT PERC CHOLECYSTOSTOMY    (Results Pending)   CT GUIDED NEEDLE PLACEMENT    (Results Pending)           Assessment/Plan:    Active Hospital Problems    Diagnosis     REJI (acute kidney injury) ([address] [N17.9]     Pulmonary infiltrates [R91.8]     Normal anion gap metabolic acidosis [K34.8]     Leukocytosis [D72.829]     Elevated alkaline phosphatase level [R74.8]     Calculus of gallbladder with acute cholecystitis without obstruction [K80.00]     Splenomegaly [R16.1]     Macrocytosis [D75.89]     Cholecystitis [K81.9]     Shock, septic ([address] [A41.9, R65.21]      Septic Shock with worsening leukocytosis  -Secondary to acute cholecystitis. Pulmonary infiltrates seen on CT Abd, CXR wihtout acute ASD. - Transfer to ICU and started on IV Levophed with CRRT. Patient was previously titrated off on 12/5 HS. -Micro: Blood Clx: NGTD, UA bland,   - Abx:  Cefepime, Flagyl, Vanco ( DC on 12/7 due to worsening REJI)   - Consult General Surg:  IR to place percutaneous cholecysostomy tube 12/6.   Biliary drainage to be collected by IR at T tube insertion. Hypothermia:   -Secondary to sepsis and infection  -Nursing instructed to verify temp rectally  -Joey Yorkgger for Temperature stability  -Continue to treat infection as outlined above. Acute kidney injury   -Secondary to hypotension, hypovolemia, infection, and Motrin/Vanco  - continue with IV hydration   - Labs: check daily BMP  -Avoid unnecessary nephrotoxins. -Judicious use of Vanco and taper with ID Micro culture data  - Consult nephrology: Bilat Renal masses will need MRI FU once pt stable. Metabolic Acidosis:   -Secondary to REJI/poor renal function  -IV Bicarb gtt  -Labs: BMP    Essential Hypertension and HLD without CAD  - Hypotensive/Shock on POA  - Meds:  antihypertensive medication on hold due to hypotension. Restart Lipitor  -Cont to optimize BP and Cholesterol as outpt     Macrocytic Anemia  -Stable  -Labs: B12, Folate in range  -Iron labs ordered  -Continue to follow    History of a neuroendocrine pancreatic cancer with episodes of hypoglycemia  -Stable  -Patient will need a glucose supply. TF to be initiated. -Cont to FU with outpt Oncologist.     DVT Prophylaxis: Heparin subcu  Diet: Diet NPO Exceptions are: Ice Chips, Sips of Water with Meds  Code Status: Full Code    PT/OT Eval Status: On hold in ICU    Dispo - Expect 2-3 days pending clinical response to medical therapy.      Linda Nick MD Left breast TE to implant with Right auto augmentation mastopexy